# Patient Record
Sex: FEMALE | Race: WHITE | NOT HISPANIC OR LATINO | Employment: PART TIME | URBAN - METROPOLITAN AREA
[De-identification: names, ages, dates, MRNs, and addresses within clinical notes are randomized per-mention and may not be internally consistent; named-entity substitution may affect disease eponyms.]

---

## 2018-03-16 PROBLEM — D75.839 THROMBOCYTOSIS: Status: ACTIVE | Noted: 2018-03-16

## 2019-04-05 PROBLEM — E87.1 HYPONATREMIA: Status: ACTIVE | Noted: 2019-04-05

## 2019-04-05 PROBLEM — D75.839 THROMBOCYTOSIS: Status: RESOLVED | Noted: 2018-03-16 | Resolved: 2019-04-05

## 2020-11-29 ENCOUNTER — HOSPITAL ENCOUNTER (OUTPATIENT)
Facility: MEDICAL CENTER | Age: 55
End: 2020-11-29
Attending: NURSE PRACTITIONER
Payer: COMMERCIAL

## 2020-11-29 ENCOUNTER — OFFICE VISIT (OUTPATIENT)
Dept: URGENT CARE | Facility: CLINIC | Age: 55
End: 2020-11-29
Payer: COMMERCIAL

## 2020-11-29 VITALS
RESPIRATION RATE: 16 BRPM | DIASTOLIC BLOOD PRESSURE: 78 MMHG | BODY MASS INDEX: 25.61 KG/M2 | SYSTOLIC BLOOD PRESSURE: 120 MMHG | OXYGEN SATURATION: 93 % | TEMPERATURE: 100.1 F | HEIGHT: 68 IN | WEIGHT: 169 LBS | HEART RATE: 91 BPM

## 2020-11-29 DIAGNOSIS — R43.2 LOSS OF TASTE: ICD-10-CM

## 2020-11-29 DIAGNOSIS — R50.9 FEVER, UNSPECIFIED FEVER CAUSE: ICD-10-CM

## 2020-11-29 DIAGNOSIS — Z20.822 SUSPECTED COVID-19 VIRUS INFECTION: Primary | ICD-10-CM

## 2020-11-29 DIAGNOSIS — R43.0 LOSS OF SENSE OF SMELL: ICD-10-CM

## 2020-11-29 DIAGNOSIS — R05.9 COUGH: ICD-10-CM

## 2020-11-29 DIAGNOSIS — R52 GENERALIZED BODY ACHES: ICD-10-CM

## 2020-11-29 PROCEDURE — U0003 INFECTIOUS AGENT DETECTION BY NUCLEIC ACID (DNA OR RNA); SEVERE ACUTE RESPIRATORY SYNDROME CORONAVIRUS 2 (SARS-COV-2) (CORONAVIRUS DISEASE [COVID-19]), AMPLIFIED PROBE TECHNIQUE, MAKING USE OF HIGH THROUGHPUT TECHNOLOGIES AS DESCRIBED BY CMS-2020-01-R: HCPCS

## 2020-11-29 PROCEDURE — 99204 OFFICE O/P NEW MOD 45 MIN: CPT | Mod: CS | Performed by: NURSE PRACTITIONER

## 2020-11-29 ASSESSMENT — ENCOUNTER SYMPTOMS
VOMITING: 0
BLOOD IN STOOL: 0
MYALGIAS: 1
SORE THROAT: 0
BACK PAIN: 0
HEADACHES: 0
NAUSEA: 0
DIARRHEA: 0
SINUS PAIN: 0
TINGLING: 0
DIZZINESS: 0
FEVER: 1
HEARTBURN: 0
ORTHOPNEA: 0
CONSTIPATION: 0
WHEEZING: 0
PALPITATIONS: 0
MEMORY LOSS: 0
COUGH: 1
ABDOMINAL PAIN: 0
CHILLS: 1
SHORTNESS OF BREATH: 0

## 2020-11-29 NOTE — PROGRESS NOTES
Subjective:      Anali Pan is a 55 y.o. female who presents with Coronavirus Screening (loss of taste and smell,bodyaches,chills and fever x2 days)        Patient presents to urgent care with complaint of loss of sense of taste and smell, body aches, chills and fever x3 days.  Patient states that she has no known COVID-19 exposure however she does work in the  industry.    Fever   This is a new problem. Episode onset: In the past 3 days. The problem occurs intermittently. The problem has been unchanged. The temperature was taken using an oral thermometer. Associated symptoms include coughing and muscle aches. Pertinent negatives include no abdominal pain, chest pain, congestion, diarrhea, ear pain, headaches, nausea, rash, sleepiness, sore throat, urinary pain, vomiting or wheezing. She has tried acetaminophen for the symptoms. The treatment provided mild relief.   Risk factors: no sick contacts        Review of Systems   Constitutional: Positive for chills and fever. Negative for malaise/fatigue.   HENT: Negative for congestion, ear pain, sinus pain and sore throat.         +loss of sense of taste  +loss of sense of smell     Respiratory: Positive for cough. Negative for shortness of breath and wheezing.    Cardiovascular: Negative for chest pain, palpitations, orthopnea and leg swelling.   Gastrointestinal: Negative for abdominal pain, blood in stool, constipation, diarrhea, heartburn, nausea and vomiting.   Genitourinary: Negative for dysuria.   Musculoskeletal: Positive for myalgias. Negative for back pain and joint pain.   Skin: Negative for rash.   Neurological: Negative for dizziness, tingling and headaches.   Psychiatric/Behavioral: Negative for memory loss and suicidal ideas.   All other systems reviewed and are negative.    PMH:  has a past medical history of Hypertension.  MEDS:   Current Outpatient Medications:   •  nabumetone (RELAFEN) 500 MG Tab, Take 1 Tab by mouth 2 times a day as  "needed for Moderate Pain., Disp: 60 Tab, Rfl: 0  •  telmisartan (MICARDIS) 80 MG Tab, TAKE 1 TABLET BY MOUTH ONCE DAILY, Disp: 90 Tab, Rfl: 3  •  Fish Oil Oil, by Does not apply route., Disp: , Rfl:   •  Probiotic Product (PROBIOTIC DAILY PO), Take  by mouth., Disp: , Rfl:   •  Non Formulary Request, EHT age defying supplement .  1 tab once a day, Disp: , Rfl:   ALLERGIES: No Known Allergies  SURGHX:   Past Surgical History:   Procedure Laterality Date   • OVARIAN CYSTECTOMY       SOCHX:  reports that she has never smoked. She has never used smokeless tobacco. She reports current alcohol use of about 1.0 oz of alcohol per week. She reports that she does not use drugs.  FH: Reviewed with patient, not pertinent to this visit.        Objective:     /78 (BP Location: Left arm, Patient Position: Sitting)   Pulse 91   Temp 37.8 °C (100.1 °F)   Resp 16   Ht 1.727 m (5' 8\")   Wt 76.7 kg (169 lb)   SpO2 93%   BMI 25.70 kg/m²      Physical Exam  Vitals signs reviewed.   Constitutional:       General: She is not in acute distress.     Appearance: Normal appearance. She is not ill-appearing.   HENT:      Head: Normocephalic.      Right Ear: Tympanic membrane, ear canal and external ear normal.      Left Ear: Tympanic membrane, ear canal and external ear normal.      Nose: Nose normal. No congestion or rhinorrhea.      Mouth/Throat:      Mouth: Mucous membranes are moist.      Pharynx: No oropharyngeal exudate or posterior oropharyngeal erythema.   Eyes:      Extraocular Movements: Extraocular movements intact.      Conjunctiva/sclera: Conjunctivae normal.      Pupils: Pupils are equal, round, and reactive to light.   Neck:      Musculoskeletal: Normal range of motion and neck supple.   Cardiovascular:      Rate and Rhythm: Normal rate and regular rhythm.      Pulses: Normal pulses.      Heart sounds: Normal heart sounds. No murmur.   Pulmonary:      Effort: Pulmonary effort is normal. No respiratory distress.      " Breath sounds: Normal breath sounds. No wheezing, rhonchi or rales.   Abdominal:      General: Abdomen is flat.      Palpations: Abdomen is soft.      Tenderness: There is no abdominal tenderness.   Musculoskeletal: Normal range of motion.      Right lower leg: No edema.      Left lower leg: No edema.   Lymphadenopathy:      Cervical: No cervical adenopathy.   Skin:     General: Skin is warm and dry.      Capillary Refill: Capillary refill takes less than 2 seconds.   Neurological:      General: No focal deficit present.      Mental Status: She is alert and oriented to person, place, and time.   Psychiatric:         Mood and Affect: Mood normal.         Behavior: Behavior normal.                 Assessment/Plan:         1. Suspected COVID-19 virus infection  COVID/SARS COV-2 PCR   2. Loss of taste  COVID/SARS COV-2 PCR   3. Loss of sense of smell  COVID/SARS COV-2 PCR   4. Generalized body aches  COVID/SARS COV-2 PCR   5. Fever, unspecified fever cause  COVID/SARS COV-2 PCR   6. Cough  COVID/SARS COV-2 PCR       May take over-the-counter Ibuprofen 400-600 mg every 8 hours as needed for pain    Await Covid results.    Discussed with patient that symptoms are suspicious for Covid-19 infection vs other viral illness.  Vitals are stable at this time.  Patient is advised to self isolate and provided with self isolation precautions AVS information.  Discussed when to return to urgent care or ER including for worsening shortness of breath.  Patient verbalized understanding and has no additional questions.    Plan of care, medications and treatments reviewed with patient and or guardian.  Patient and or guardian voices understanding and agrees with the instructions provided. After visit summary reviewed with patient. Patient and or guardian understand the parameters for reevaluation and ER precautions discussed.     Please note that this dictation was created using voice recognition software. I have made every reasonable  attempt to correct obvious errors, but I expect that there are errors of grammar and possibly content that I did not discover before finalizing the note.

## 2020-12-01 DIAGNOSIS — R43.2 LOSS OF TASTE: ICD-10-CM

## 2020-12-01 DIAGNOSIS — Z20.822 SUSPECTED COVID-19 VIRUS INFECTION: ICD-10-CM

## 2020-12-01 DIAGNOSIS — R43.0 LOSS OF SENSE OF SMELL: ICD-10-CM

## 2020-12-01 DIAGNOSIS — R52 GENERALIZED BODY ACHES: ICD-10-CM

## 2020-12-01 DIAGNOSIS — R05.9 COUGH: ICD-10-CM

## 2020-12-01 DIAGNOSIS — R50.9 FEVER, UNSPECIFIED FEVER CAUSE: ICD-10-CM

## 2020-12-01 LAB
COVID ORDER STATUS COVID19: NORMAL
SARS-COV-2 RNA RESP QL NAA+PROBE: DETECTED
SPECIMEN SOURCE: ABNORMAL

## 2023-01-31 ENCOUNTER — HOSPITAL ENCOUNTER (INPATIENT)
Facility: MEDICAL CENTER | Age: 58
LOS: 6 days | DRG: 064 | End: 2023-02-06
Attending: INTERNAL MEDICINE | Admitting: STUDENT IN AN ORGANIZED HEALTH CARE EDUCATION/TRAINING PROGRAM
Payer: COMMERCIAL

## 2023-01-31 ENCOUNTER — HOSPITAL ENCOUNTER (OUTPATIENT)
Dept: RADIOLOGY | Facility: MEDICAL CENTER | Age: 58
End: 2023-01-31

## 2023-01-31 DIAGNOSIS — I10 PRIMARY HYPERTENSION: ICD-10-CM

## 2023-01-31 DIAGNOSIS — I61.9 NONTRAUMATIC ACUTE HEMORRHAGE OF BASAL GANGLIA (HCC): ICD-10-CM

## 2023-01-31 DIAGNOSIS — Z78.9 ALCOHOL USE: ICD-10-CM

## 2023-01-31 PROCEDURE — 770022 HCHG ROOM/CARE - ICU (200)

## 2023-01-31 PROCEDURE — 93005 ELECTROCARDIOGRAM TRACING: CPT | Performed by: STUDENT IN AN ORGANIZED HEALTH CARE EDUCATION/TRAINING PROGRAM

## 2023-01-31 PROCEDURE — 99291 CRITICAL CARE FIRST HOUR: CPT | Performed by: STUDENT IN AN ORGANIZED HEALTH CARE EDUCATION/TRAINING PROGRAM

## 2023-01-31 RX ORDER — POLYETHYLENE GLYCOL 3350 17 G/17G
1 POWDER, FOR SOLUTION ORAL
Status: DISCONTINUED | OUTPATIENT
Start: 2023-01-31 | End: 2023-02-06 | Stop reason: HOSPADM

## 2023-01-31 RX ORDER — ONDANSETRON 2 MG/ML
4 INJECTION INTRAMUSCULAR; INTRAVENOUS EVERY 4 HOURS PRN
Status: DISCONTINUED | OUTPATIENT
Start: 2023-01-31 | End: 2023-02-06 | Stop reason: HOSPADM

## 2023-01-31 RX ORDER — ENALAPRILAT 1.25 MG/ML
1.25 INJECTION INTRAVENOUS EVERY 6 HOURS PRN
Status: DISCONTINUED | OUTPATIENT
Start: 2023-01-31 | End: 2023-01-31

## 2023-01-31 RX ORDER — LORAZEPAM 2 MG/ML
2 INJECTION INTRAMUSCULAR
Status: DISCONTINUED | OUTPATIENT
Start: 2023-01-31 | End: 2023-02-06 | Stop reason: HOSPADM

## 2023-01-31 RX ORDER — ONDANSETRON 4 MG/1
4 TABLET, ORALLY DISINTEGRATING ORAL EVERY 4 HOURS PRN
Status: DISCONTINUED | OUTPATIENT
Start: 2023-01-31 | End: 2023-01-31

## 2023-01-31 RX ORDER — ACETAMINOPHEN 650 MG/1
650 SUPPOSITORY RECTAL EVERY 4 HOURS PRN
Status: DISCONTINUED | OUTPATIENT
Start: 2023-01-31 | End: 2023-02-06 | Stop reason: HOSPADM

## 2023-01-31 RX ORDER — ENALAPRILAT 1.25 MG/ML
1.25 INJECTION INTRAVENOUS EVERY 6 HOURS PRN
Status: DISCONTINUED | OUTPATIENT
Start: 2023-01-31 | End: 2023-02-01

## 2023-01-31 RX ORDER — ACETAMINOPHEN 325 MG/1
650 TABLET ORAL EVERY 4 HOURS PRN
Status: DISCONTINUED | OUTPATIENT
Start: 2023-01-31 | End: 2023-02-06 | Stop reason: HOSPADM

## 2023-01-31 RX ORDER — LABETALOL HYDROCHLORIDE 5 MG/ML
10 INJECTION, SOLUTION INTRAVENOUS EVERY 4 HOURS PRN
Status: DISCONTINUED | OUTPATIENT
Start: 2023-01-31 | End: 2023-02-01

## 2023-01-31 RX ORDER — ONDANSETRON 4 MG/1
4 TABLET, ORALLY DISINTEGRATING ORAL EVERY 4 HOURS PRN
Status: DISCONTINUED | OUTPATIENT
Start: 2023-01-31 | End: 2023-02-06 | Stop reason: HOSPADM

## 2023-01-31 RX ORDER — ONDANSETRON 2 MG/ML
4 INJECTION INTRAMUSCULAR; INTRAVENOUS EVERY 4 HOURS PRN
Status: DISCONTINUED | OUTPATIENT
Start: 2023-01-31 | End: 2023-01-31

## 2023-01-31 RX ORDER — HYDRALAZINE HYDROCHLORIDE 20 MG/ML
10 INJECTION INTRAMUSCULAR; INTRAVENOUS EVERY 4 HOURS PRN
Status: DISCONTINUED | OUTPATIENT
Start: 2023-01-31 | End: 2023-02-01

## 2023-01-31 RX ORDER — BISACODYL 10 MG
10 SUPPOSITORY, RECTAL RECTAL
Status: DISCONTINUED | OUTPATIENT
Start: 2023-01-31 | End: 2023-02-06 | Stop reason: HOSPADM

## 2023-01-31 RX ORDER — ACETAMINOPHEN 325 MG/1
650 TABLET ORAL EVERY 6 HOURS PRN
Status: DISCONTINUED | OUTPATIENT
Start: 2023-01-31 | End: 2023-01-31

## 2023-01-31 RX ORDER — AMOXICILLIN 250 MG
2 CAPSULE ORAL 2 TIMES DAILY
Status: DISCONTINUED | OUTPATIENT
Start: 2023-02-01 | End: 2023-02-06 | Stop reason: HOSPADM

## 2023-01-31 RX ORDER — SODIUM CHLORIDE 9 MG/ML
INJECTION, SOLUTION INTRAVENOUS CONTINUOUS
Status: DISCONTINUED | OUTPATIENT
Start: 2023-02-01 | End: 2023-02-02

## 2023-01-31 ASSESSMENT — FIBROSIS 4 INDEX: FIB4 SCORE: 1.14

## 2023-02-01 ENCOUNTER — APPOINTMENT (OUTPATIENT)
Dept: RADIOLOGY | Facility: MEDICAL CENTER | Age: 58
DRG: 064 | End: 2023-02-01
Attending: STUDENT IN AN ORGANIZED HEALTH CARE EDUCATION/TRAINING PROGRAM
Payer: COMMERCIAL

## 2023-02-01 PROBLEM — Z78.9 ALCOHOL USE: Status: ACTIVE | Noted: 2023-02-01

## 2023-02-01 PROBLEM — E87.6 HYPOKALEMIA: Status: ACTIVE | Noted: 2023-02-01

## 2023-02-01 PROBLEM — E78.5 DYSLIPIDEMIA: Status: ACTIVE | Noted: 2023-02-01

## 2023-02-01 LAB
ALBUMIN SERPL BCP-MCNC: 4.5 G/DL (ref 3.2–4.9)
ALBUMIN/GLOB SERPL: 1.7 G/DL
ALP SERPL-CCNC: 96 U/L (ref 30–99)
ALT SERPL-CCNC: 28 U/L (ref 2–50)
AMPHET UR QL SCN: NEGATIVE
ANION GAP SERPL CALC-SCNC: 12 MMOL/L (ref 7–16)
AST SERPL-CCNC: 28 U/L (ref 12–45)
BARBITURATES UR QL SCN: NEGATIVE
BENZODIAZ UR QL SCN: NEGATIVE
BILIRUB SERPL-MCNC: 0.6 MG/DL (ref 0.1–1.5)
BUN SERPL-MCNC: 13 MG/DL (ref 8–22)
BZE UR QL SCN: NEGATIVE
CALCIUM ALBUM COR SERPL-MCNC: 8.6 MG/DL (ref 8.5–10.5)
CALCIUM SERPL-MCNC: 9 MG/DL (ref 8.5–10.5)
CANNABINOIDS UR QL SCN: NEGATIVE
CFT BLD TEG: 2.6 MIN (ref 4.6–9.1)
CFT P HPASE BLD TEG: 2.5 MIN (ref 4.3–8.3)
CHLORIDE SERPL-SCNC: 102 MMOL/L (ref 96–112)
CHOLEST SERPL-MCNC: 220 MG/DL (ref 100–199)
CLOT ANGLE BLD TEG: 76.6 DEGREES (ref 63–78)
CLOT LYSIS 30M P MA LENFR BLD TEG: 0 % (ref 0–2.6)
CO2 SERPL-SCNC: 23 MMOL/L (ref 20–33)
CREAT SERPL-MCNC: 0.55 MG/DL (ref 0.5–1.4)
CT.EXTRINSIC BLD ROTEM: 1 MIN (ref 0.8–2.1)
EKG IMPRESSION: NORMAL
ERYTHROCYTE [DISTWIDTH] IN BLOOD BY AUTOMATED COUNT: 41.9 FL (ref 35.9–50)
EST. AVERAGE GLUCOSE BLD GHB EST-MCNC: 100 MG/DL
ETHANOL BLD-MCNC: <10.1 MG/DL
GFR SERPLBLD CREATININE-BSD FMLA CKD-EPI: 107 ML/MIN/1.73 M 2
GLOBULIN SER CALC-MCNC: 2.7 G/DL (ref 1.9–3.5)
GLUCOSE SERPL-MCNC: 125 MG/DL (ref 65–99)
HBA1C MFR BLD: 5.1 % (ref 4–5.6)
HCT VFR BLD AUTO: 39.9 % (ref 37–47)
HDLC SERPL-MCNC: 68 MG/DL
HGB BLD-MCNC: 13.7 G/DL (ref 12–16)
LDLC SERPL CALC-MCNC: 139 MG/DL
MAGNESIUM SERPL-MCNC: 2.1 MG/DL (ref 1.5–2.5)
MCF BLD TEG: 64.8 MM (ref 52–69)
MCF.PLATELET INHIB BLD ROTEM: 31.2 MM (ref 15–32)
MCH RBC QN AUTO: 32.2 PG (ref 27–33)
MCHC RBC AUTO-ENTMCNC: 34.3 G/DL (ref 33.6–35)
MCV RBC AUTO: 93.7 FL (ref 81.4–97.8)
METHADONE UR QL SCN: NEGATIVE
OPIATES UR QL SCN: NEGATIVE
OXYCODONE UR QL SCN: NEGATIVE
PA AA BLD-ACNC: 11.5 % (ref 0–11)
PA ADP BLD-ACNC: 0 % (ref 0–17)
PCP UR QL SCN: NEGATIVE
PLATELET # BLD AUTO: 339 K/UL (ref 164–446)
PMV BLD AUTO: 9.6 FL (ref 9–12.9)
POTASSIUM SERPL-SCNC: 3.3 MMOL/L (ref 3.6–5.5)
PROPOXYPH UR QL SCN: NEGATIVE
PROT SERPL-MCNC: 7.2 G/DL (ref 6–8.2)
RBC # BLD AUTO: 4.26 M/UL (ref 4.2–5.4)
SODIUM SERPL-SCNC: 135 MMOL/L (ref 135–145)
SODIUM SERPL-SCNC: 137 MMOL/L (ref 135–145)
TEG ALGORITHM TGALG: ABNORMAL
TRIGL SERPL-MCNC: 67 MG/DL (ref 0–149)
WBC # BLD AUTO: 7 K/UL (ref 4.8–10.8)

## 2023-02-01 PROCEDURE — 85384 FIBRINOGEN ACTIVITY: CPT

## 2023-02-01 PROCEDURE — 85347 COAGULATION TIME ACTIVATED: CPT

## 2023-02-01 PROCEDURE — 83036 HEMOGLOBIN GLYCOSYLATED A1C: CPT

## 2023-02-01 PROCEDURE — A9270 NON-COVERED ITEM OR SERVICE: HCPCS | Performed by: STUDENT IN AN ORGANIZED HEALTH CARE EDUCATION/TRAINING PROGRAM

## 2023-02-01 PROCEDURE — 92610 EVALUATE SWALLOWING FUNCTION: CPT

## 2023-02-01 PROCEDURE — 700102 HCHG RX REV CODE 250 W/ 637 OVERRIDE(OP): Performed by: STUDENT IN AN ORGANIZED HEALTH CARE EDUCATION/TRAINING PROGRAM

## 2023-02-01 PROCEDURE — 700105 HCHG RX REV CODE 258: Performed by: STUDENT IN AN ORGANIZED HEALTH CARE EDUCATION/TRAINING PROGRAM

## 2023-02-01 PROCEDURE — 85027 COMPLETE CBC AUTOMATED: CPT

## 2023-02-01 PROCEDURE — 700111 HCHG RX REV CODE 636 W/ 250 OVERRIDE (IP): Performed by: INTERNAL MEDICINE

## 2023-02-01 PROCEDURE — 770022 HCHG ROOM/CARE - ICU (200)

## 2023-02-01 PROCEDURE — 80053 COMPREHEN METABOLIC PANEL: CPT

## 2023-02-01 PROCEDURE — 80061 LIPID PANEL: CPT

## 2023-02-01 PROCEDURE — 70553 MRI BRAIN STEM W/O & W/DYE: CPT

## 2023-02-01 PROCEDURE — 700117 HCHG RX CONTRAST REV CODE 255: Performed by: STUDENT IN AN ORGANIZED HEALTH CARE EDUCATION/TRAINING PROGRAM

## 2023-02-01 PROCEDURE — 82077 ASSAY SPEC XCP UR&BREATH IA: CPT

## 2023-02-01 PROCEDURE — 83735 ASSAY OF MAGNESIUM: CPT

## 2023-02-01 PROCEDURE — 85576 BLOOD PLATELET AGGREGATION: CPT | Mod: 91

## 2023-02-01 PROCEDURE — 99291 CRITICAL CARE FIRST HOUR: CPT | Performed by: INTERNAL MEDICINE

## 2023-02-01 PROCEDURE — 70544 MR ANGIOGRAPHY HEAD W/O DYE: CPT

## 2023-02-01 PROCEDURE — 700111 HCHG RX REV CODE 636 W/ 250 OVERRIDE (IP): Performed by: NURSE PRACTITIONER

## 2023-02-01 PROCEDURE — 700101 HCHG RX REV CODE 250: Performed by: STUDENT IN AN ORGANIZED HEALTH CARE EDUCATION/TRAINING PROGRAM

## 2023-02-01 PROCEDURE — 700111 HCHG RX REV CODE 636 W/ 250 OVERRIDE (IP): Performed by: STUDENT IN AN ORGANIZED HEALTH CARE EDUCATION/TRAINING PROGRAM

## 2023-02-01 PROCEDURE — 99222 1ST HOSP IP/OBS MODERATE 55: CPT | Mod: FS | Performed by: NURSE PRACTITIONER

## 2023-02-01 PROCEDURE — 80307 DRUG TEST PRSMV CHEM ANLYZR: CPT

## 2023-02-01 PROCEDURE — 84295 ASSAY OF SERUM SODIUM: CPT

## 2023-02-01 PROCEDURE — 93010 ELECTROCARDIOGRAM REPORT: CPT | Performed by: INTERNAL MEDICINE

## 2023-02-01 PROCEDURE — A9579 GAD-BASE MR CONTRAST NOS,1ML: HCPCS | Performed by: STUDENT IN AN ORGANIZED HEALTH CARE EDUCATION/TRAINING PROGRAM

## 2023-02-01 RX ORDER — ATORVASTATIN CALCIUM 10 MG/1
10 TABLET, FILM COATED ORAL EVERY EVENING
Status: DISCONTINUED | OUTPATIENT
Start: 2023-02-01 | End: 2023-02-06 | Stop reason: HOSPADM

## 2023-02-01 RX ORDER — HYDRALAZINE HYDROCHLORIDE 20 MG/ML
10 INJECTION INTRAMUSCULAR; INTRAVENOUS EVERY 4 HOURS PRN
Status: DISCONTINUED | OUTPATIENT
Start: 2023-02-01 | End: 2023-02-06 | Stop reason: HOSPADM

## 2023-02-01 RX ORDER — LABETALOL HYDROCHLORIDE 5 MG/ML
10 INJECTION, SOLUTION INTRAVENOUS EVERY 4 HOURS PRN
Status: DISCONTINUED | OUTPATIENT
Start: 2023-02-01 | End: 2023-02-06 | Stop reason: HOSPADM

## 2023-02-01 RX ORDER — POTASSIUM CHLORIDE 7.45 MG/ML
10 INJECTION INTRAVENOUS
Status: COMPLETED | OUTPATIENT
Start: 2023-02-01 | End: 2023-02-01

## 2023-02-01 RX ORDER — ATORVASTATIN CALCIUM 20 MG/1
20 TABLET, FILM COATED ORAL EVERY EVENING
Status: DISCONTINUED | OUTPATIENT
Start: 2023-02-01 | End: 2023-02-01

## 2023-02-01 RX ORDER — LORAZEPAM 2 MG/ML
1 INJECTION INTRAMUSCULAR
Status: COMPLETED | OUTPATIENT
Start: 2023-02-01 | End: 2023-02-01

## 2023-02-01 RX ORDER — ENALAPRILAT 1.25 MG/ML
1.25 INJECTION INTRAVENOUS EVERY 6 HOURS PRN
Status: DISCONTINUED | OUTPATIENT
Start: 2023-02-01 | End: 2023-02-06 | Stop reason: HOSPADM

## 2023-02-01 RX ADMIN — LORAZEPAM 1 MG: 2 INJECTION INTRAMUSCULAR; INTRAVENOUS at 05:00

## 2023-02-01 RX ADMIN — THIAMINE HYDROCHLORIDE 200 MG: 100 INJECTION, SOLUTION INTRAMUSCULAR; INTRAVENOUS at 05:56

## 2023-02-01 RX ADMIN — POTASSIUM CHLORIDE 10 MEQ: 7.46 INJECTION, SOLUTION INTRAVENOUS at 11:55

## 2023-02-01 RX ADMIN — SODIUM CHLORIDE: 9 INJECTION, SOLUTION INTRAVENOUS at 02:13

## 2023-02-01 RX ADMIN — POTASSIUM CHLORIDE 10 MEQ: 7.46 INJECTION, SOLUTION INTRAVENOUS at 11:00

## 2023-02-01 RX ADMIN — ATORVASTATIN CALCIUM 10 MG: 10 TABLET, FILM COATED ORAL at 17:17

## 2023-02-01 RX ADMIN — ONDANSETRON 4 MG: 2 INJECTION INTRAMUSCULAR; INTRAVENOUS at 18:27

## 2023-02-01 RX ADMIN — THIAMINE HYDROCHLORIDE 200 MG: 100 INJECTION, SOLUTION INTRAMUSCULAR; INTRAVENOUS at 17:27

## 2023-02-01 RX ADMIN — NICARDIPINE HYDROCHLORIDE 15 MG/HR: 25 INJECTION, SOLUTION INTRAVENOUS at 01:45

## 2023-02-01 RX ADMIN — LORAZEPAM 1 MG: 2 INJECTION INTRAMUSCULAR; INTRAVENOUS at 05:30

## 2023-02-01 RX ADMIN — ACETAMINOPHEN 650 MG: 325 TABLET, FILM COATED ORAL at 17:21

## 2023-02-01 RX ADMIN — SENNOSIDES AND DOCUSATE SODIUM 2 TABLET: 50; 8.6 TABLET ORAL at 17:17

## 2023-02-01 RX ADMIN — POTASSIUM CHLORIDE 10 MEQ: 7.46 INJECTION, SOLUTION INTRAVENOUS at 09:57

## 2023-02-01 RX ADMIN — SODIUM CHLORIDE: 9 INJECTION, SOLUTION INTRAVENOUS at 15:46

## 2023-02-01 RX ADMIN — HYDRALAZINE HYDROCHLORIDE 10 MG: 20 INJECTION INTRAMUSCULAR; INTRAVENOUS at 11:07

## 2023-02-01 RX ADMIN — POTASSIUM CHLORIDE 10 MEQ: 7.46 INJECTION, SOLUTION INTRAVENOUS at 08:55

## 2023-02-01 RX ADMIN — GADOTERIDOL 17 ML: 279.3 INJECTION, SOLUTION INTRAVENOUS at 05:21

## 2023-02-01 RX ADMIN — HYDRALAZINE HYDROCHLORIDE 10 MG: 20 INJECTION INTRAMUSCULAR; INTRAVENOUS at 18:13

## 2023-02-01 RX ADMIN — LABETALOL HYDROCHLORIDE 10 MG: 5 INJECTION, SOLUTION INTRAVENOUS at 16:17

## 2023-02-01 RX ADMIN — LABETALOL HYDROCHLORIDE 10 MG: 5 INJECTION, SOLUTION INTRAVENOUS at 08:51

## 2023-02-01 RX ADMIN — NICARDIPINE HYDROCHLORIDE 15 MG/HR: 25 INJECTION, SOLUTION INTRAVENOUS at 00:00

## 2023-02-01 RX ADMIN — SODIUM CHLORIDE: 9 INJECTION, SOLUTION INTRAVENOUS at 01:51

## 2023-02-01 ASSESSMENT — COGNITIVE AND FUNCTIONAL STATUS - GENERAL
SUGGESTED CMS G CODE MODIFIER MOBILITY: CH
MOBILITY SCORE: 24

## 2023-02-01 ASSESSMENT — PAIN DESCRIPTION - PAIN TYPE
TYPE: ACUTE PAIN

## 2023-02-01 ASSESSMENT — LIFESTYLE VARIABLES
EVER FELT BAD OR GUILTY ABOUT YOUR DRINKING: YES
TOTAL SCORE: 3
ALCOHOL_USE: YES
EVER HAD A DRINK FIRST THING IN THE MORNING TO STEADY YOUR NERVES TO GET RID OF A HANGOVER: NO
TOTAL SCORE: 3
DOES PATIENT WANT TO STOP DRINKING: CANNOT ASSESS
ON A TYPICAL DAY WHEN YOU DRINK ALCOHOL HOW MANY DRINKS DO YOU HAVE: 5
TOTAL SCORE: 3
HAVE YOU EVER FELT YOU SHOULD CUT DOWN ON YOUR DRINKING: YES
HAVE PEOPLE ANNOYED YOU BY CRITICIZING YOUR DRINKING: YES
CONSUMPTION TOTAL: INCOMPLETE
AVERAGE NUMBER OF DAYS PER WEEK YOU HAVE A DRINK CONTAINING ALCOHOL: 6

## 2023-02-01 ASSESSMENT — FIBROSIS 4 INDEX: FIB4 SCORE: 1.14

## 2023-02-01 NOTE — CONSULTS
No chief complaint on file.      Problem List Items Addressed This Visit       * (Principal) Nontraumatic acute hemorrhage of basal ganglia (HCC)     Hypertensive etiology  Admit ICU  q1 hour neuro checks    SBP <140    PRN:  - Labetalol  - Hydralazine    Nicardipine gtt     No VTE ppx/antiplatelet   q6 Na - ok for normonatremia currently, if worsening edema / shift on next CT will add 3%  Mannitol 50mg q6 if concern for elevated ICP  Repeat CT head w/out contrast for clinical change  MRI brain w/wo contrast (ich protocol)  MRA brain without contrast  Unless traumatic, no indication for seizure prophylaxis  Platelet >100k, INR <1.5  Echocardiogram    HOB > 30 degrees  PT/OT/SLP  Avoid lying flat when possible    Neurology discussed with OSH on transfer center call, aware of the patient.  Will re-engage them in the morning         Relevant Orders    Referral to Physiatry (PMR)    Referral to Neurology       Neurology Consultation    History of present illness:  This is a 57-year old female with Pmhx significant for hypertension, medical non compliance, ETOH abuse (5-6 drinks per day) who presented to [facility] as a transfer from [facility] where she presented for a chief complaint of Right sided weakness and difficulty speaking/altered mentation. Per report, the patient was witnessed to have sudden onset Right sided weakness yesterday evening; exact time is unclear. She reportedly complained of a headache as well, and difficulty speaking. She was subsequently brought to the OSH; on arrival, SBP reportedly in the 200s; was treated with nicardipine. Stat CT head revealed acute Left thalamic hemorrhage, perhaps with some involvement of the midbrain; no obvious intraventricular hemorrhage. Per family's request patient was ultimately transferred to [facility] this morning for further care. Currently patient is sitting up in bed; somnolent. Reportedly received Ativan en route to [facility] and prior to MRI this  morning. She is arousable, purposeful with LUE/LLE; however does not follow commands. Minimal verbal output; few incomprehensible words; ROS is thus limited.     ICH score at 0805 2/1/23: 1 (GCS 5-12).     Neurology has been consulted by Dr. Jeremy Gonda to further evaluate the findings noted above.     Past medical history:   Past Medical History:   Diagnosis Date    Hypertension        Past surgical history:   Past Surgical History:   Procedure Laterality Date    OVARIAN CYSTECTOMY         Family history:   Family History   Problem Relation Age of Onset    Breast Cancer Mother     Hypertension Mother     Thyroid Mother         Hyperthyoidism    Cancer Mother         Breast    Hypertension Father     Diabetes Father         Type 2    Hypertension Brother         Gout       Social history:   Social History     Socioeconomic History    Marital status: Single     Spouse name: Not on file    Number of children: Not on file    Years of education: Not on file    Highest education level: Not on file   Occupational History    Not on file   Tobacco Use    Smoking status: Never    Smokeless tobacco: Never   Vaping Use    Vaping Use: Never used   Substance and Sexual Activity    Alcohol use: Yes     Alcohol/week: 21.0 oz     Types: 35 Glasses of wine per week     Comment: 1-2 drinks per day, 4-6 days a week    Drug use: No    Sexual activity: Not Currently   Other Topics Concern    Not on file   Social History Narrative    . 3 kids. One at home. Self employed     Social Determinants of Health     Financial Resource Strain: Not on file   Food Insecurity: Not on file   Transportation Needs: Not on file   Physical Activity: Not on file   Stress: Not on file   Social Connections: Not on file   Intimate Partner Violence: Not on file   Housing Stability: Not on file       Current medications:   Current Facility-Administered Medications   Medication Dose    thiamine (B-1) 200 mg in dextrose 5% 100 mL IVPB  200 mg    Followed  by    [START ON 2/3/2023] thiamine (B-1) IVPB 100 mg in 100 mL D5W (premix)  100 mg    enalaprilat (Vasotec) injection 1.25 mg 1 mL  1.25 mg    hydrALAZINE (APRESOLINE) injection 10 mg  10 mg    labetalol (NORMODYNE/TRANDATE) injection 10 mg  10 mg    niCARdipine (CARDENE) 25 mg in  mL Standard Infusion  0-15 mg/hr    senna-docusate (PERICOLACE or SENOKOT S) 8.6-50 MG per tablet 2 Tablet  2 Tablet    And    polyethylene glycol/lytes (MIRALAX) PACKET 1 Packet  1 Packet    And    magnesium hydroxide (MILK OF MAGNESIA) suspension 30 mL  30 mL    And    bisacodyl (DULCOLAX) suppository 10 mg  10 mg    NS infusion      LORazepam (ATIVAN) injection 2 mg  2 mg    acetaminophen (Tylenol) tablet 650 mg  650 mg    Or    acetaminophen (TYLENOL) suppository 650 mg  650 mg    ondansetron (ZOFRAN ODT) dispertab 4 mg  4 mg    Or    ondansetron (ZOFRAN) syringe/vial injection 4 mg  4 mg    MD Alert...ICU Electrolyte Replacement per Pharmacy         Medication Allergy:  No Known Allergies    Review of systems:   As noted above, otherwise limited as patient is aphasic, altered.     Physical examination:   Vitals:    02/01/23 0500 02/01/23 0600 02/01/23 0700 02/01/23 0800   BP: 130/78 135/82 117/72 131/78   Pulse: 95 77 77 76   Resp: 16 12 13 13   Temp:       TempSrc:       SpO2: 98% 99% 98% 98%   Weight:       Height:         General: Patient in no acute distress.  HEENT: Normocephalic, no signs of acute trauma.   Neck: supple, no meningeal signs. There is normal range of motion. No tenderness on exam.   Chest: clear to auscultation. No cough.   CV: RRR, no murmurs.   Skin: no signs of acute rashes or trauma.   Musculoskeletal: joints exhibit full range of motion, without any pain to palpation. There are no signs of joint or muscle swelling. There is no tenderness to deep palpation of muscles.   Psychiatric: No hallucinatory behavior.       NEUROLOGICAL EXAM:   Mental status, orientation: Arousable, poorly interactive. Does not  follow commands.   Speech and language: Minimal verbal output; few incomprehensible words; does not follow commands.   Cranial nerve exam: Pupils are 3 mm bilaterally and equally reactive to light. Eyes midline, gaze downward. Blinks to visual threat, more briskly on the Right. Does not track spontaneously nor to command. Cough/gag/corneal reflexes appear to be intact. Occulocephalics intact. Face appears symmetric.  Tongue is midline and without any signs of tongue biting or fasciculations.  Motor exam: Moves LUE/LE spontaneously, with full antigravity; RUE with minimal movement; RLE at least antigravity. No abnormal movements were seen on exam.   Sensory exam Localize to nox stim x 4, more brisk on the Left  Deep tendon reflexes: Plantar responses are flexor. There is no clonus.   Coordination: Deferred, patient does not participate.   Gait: Not assessed at this time as patient is currently unable.      ANCILLARY DATA REVIEWED:     Lab Data Review:  Recent Results (from the past 24 hour(s))   COMPLETE CBC & AUTO DIFF WBC    Collection Time: 01/31/23  8:55 PM   Result Value Ref Range    Leukocytes, Absolute 8.2 3.7 - 10.6 x10E3/uL    RBC 4.39 4.19 - 5.21 x10e6/uL    Hemoglobin 13.9 12.3 - 16.0 g/dL    Hematocrit 40.4 36.0 - 47.0 %    MCV 92.0 81.0 - 99.0 fL    MCH 31.6 28.0 - 33.8 pg    MCHC 34.4 33.1 - 36.5 g/dL    RDW 12.5 11.8 - 14.0 %    Platelet Count 378 146 - 390 x10E3/uL    MPV 7.7 6.4 - 10.2 fL    Neutrophils-Polys 55.4 41.7 - 82.3 %    Lymphocytes 30.3 10.8 - 44.4 %    Monocytes 7.1 5.0 - 12.8 %    Eosinophils 6.8 (H) 0.0 - 6.6 %    Basophils 0.4 0.0 - 1.3 %    Neutrophils (Absolute) 4.50 1.40 - 8.00 x10E3/uL    Lymphs (Absolute) 2.50 1.00 - 5.20 x10E3/uL    Monos (Absolute) 0.60 0.16 - 1.00 x10E3/uL    Eos (Absolute) 0.60 0.00 - 0.80 x10E3/uL    Baso (Absolute) 0.00 0.00 - 0.30 x10E3/uL   COMP METABOLIC PANEL    Collection Time: 01/31/23  8:55 PM   Result Value Ref Range    Sodium 134 (L) 136 - 144  mmol/L    Potassium 4.2 3.6 - 5.1 mmol/L    Chloride 100 (L) 102 - 110 mmol/L    Co2 24 22 - 32 mmol/L    Alkaline Phosphatase 95 38 - 126 U/L    AST(SGOT) 42 (H) 15 - 41 U/L    ALT(SGPT) 30 14 - 54 U/L    Bun 22 (H) 8 - 20 mg/dL    Creatinine 0.71 0.60 - 1.10 mg/dL    Calcium 9.5 8.7 - 10.3 mg/dL    Total Protein 7.6 6.4 - 8.2 g/dL    Albumin 4.6 3.5 - 4.8 g/dL    Ag Ratio 1.5 1.0 - 2.2 ratio    Anion Gap 10 2 - 11 mmol/L    Glom Filt Rate, Est 85 >60 mL/min/1.7    Globulin 3.0 2.6 - 3.1 g/dL    Glucose 108 (H) 60 - 99 mg/dL    Total Bilirubin 0.4 0.4 - 2.0 mg/dL   EKG    Collection Time: 23 11:30 PM   Result Value Ref Range    Report       [facility]    Test Date:  2023  Pt Name:    NICHOLE SANTIAGO             Department: [facility]  MRN:        [id number]                      Room:       [facility]  Gender:     Female                       Technician: [facility]  :        [id number]                   Requested By:CARLOS CRESPO IV  Order #:    [id number]                    Reading MD: Otoniel Adhikari MD    Measurements  Intervals                                Axis  Rate:       103                          P:          50  PA:         173                          QRS:        -13  QRSD:       97                           T:          40  QT:         385  QTc:        504    Interpretive Statements  Sinus tachycardia  Borderline prolonged QT interval  No previous ECG available for comparison  Electronically Signed On 2023 6:29:07 PST by Otoniel Adhikari MD     DIAGNOSTIC ALCOHOL    Collection Time: 23 12:40 AM   Result Value Ref Range    Diagnostic Alcohol <10.1 <10.1 mg/dL   SODIUM SERUM (NA)    Collection Time: 23 12:40 AM   Result Value Ref Range    Sodium 135 135 - 145 mmol/L   Lipid Profile    Collection Time: 23 12:40 AM   Result Value Ref Range    Cholesterol,Tot 220 (H) 100 - 199 mg/dL    Triglycerides 67 0 - 149 mg/dL    HDL 68 >=40 mg/dL     (H) <100 mg/dL   HEMOGLOBIN A1C     Collection Time: 02/01/23 12:40 AM   Result Value Ref Range    Glycohemoglobin 5.1 4.0 - 5.6 %    Est Avg Glucose 100 mg/dL   Platelet Mapping with Basic TEG    Collection Time: 02/01/23 12:55 AM   Result Value Ref Range    Reaction Time Initial-R 2.6 (L) 4.6 - 9.1 min    React Time Initial Hep 2.5 (L) 4.3 - 8.3 min    Clot Kinetics-K 1.0 0.8 - 2.1 min    Clot Angle-Angle 76.6 63.0 - 78.0 degrees    Maximum Clot Strength-MA 64.8 52.0 - 69.0 mm    TEG Functional Fibrinogen(MA) 31.2 15.0 - 32.0 mm    Lysis 30 minutes-LY30 0.0 0.0 - 2.6 %    % Inhibition ADP 0.0 0.0 - 17.0 %    % Inhibition AA 11.5 (H) 0.0 - 11.0 %    TEG Algorithm Link Algorithm    Urine Drug Screen    Collection Time: 02/01/23  1:50 AM   Result Value Ref Range    Amphetamines Urine Negative Negative    Barbiturates Negative Negative    Benzodiazepines Negative Negative    Cocaine Metabolite Negative Negative    Methadone Negative Negative    Opiates Negative Negative    Oxycodone Negative Negative    Phencyclidine -Pcp Negative Negative    Propoxyphene Negative Negative    Cannabinoid Metab Negative Negative   CBC without Differential    Collection Time: 02/01/23  6:00 AM   Result Value Ref Range    WBC 7.0 4.8 - 10.8 K/uL    RBC 4.26 4.20 - 5.40 M/uL    Hemoglobin 13.7 12.0 - 16.0 g/dL    Hematocrit 39.9 37.0 - 47.0 %    MCV 93.7 81.4 - 97.8 fL    MCH 32.2 27.0 - 33.0 pg    MCHC 34.3 33.6 - 35.0 g/dL    RDW 41.9 35.9 - 50.0 fL    Platelet Count 339 164 - 446 K/uL    MPV 9.6 9.0 - 12.9 fL   Comp Metabolic Panel (CMP)    Collection Time: 02/01/23  6:00 AM   Result Value Ref Range    Sodium 137 135 - 145 mmol/L    Potassium 3.3 (L) 3.6 - 5.5 mmol/L    Chloride 102 96 - 112 mmol/L    Co2 23 20 - 33 mmol/L    Anion Gap 12.0 7.0 - 16.0    Glucose 125 (H) 65 - 99 mg/dL    Bun 13 8 - 22 mg/dL    Creatinine 0.55 0.50 - 1.40 mg/dL    Calcium 9.0 8.5 - 10.5 mg/dL    AST(SGOT) 28 12 - 45 U/L    ALT(SGPT) 28 2 - 50 U/L    Alkaline Phosphatase 96 30 - 99 U/L     Total Bilirubin 0.6 0.1 - 1.5 mg/dL    Albumin 4.5 3.2 - 4.9 g/dL    Total Protein 7.2 6.0 - 8.2 g/dL    Globulin 2.7 1.9 - 3.5 g/dL    A-G Ratio 1.7 g/dL   Magnesium    Collection Time: 02/01/23  6:00 AM   Result Value Ref Range    Magnesium 2.1 1.5 - 2.5 mg/dL   ESTIMATED GFR    Collection Time: 02/01/23  6:00 AM   Result Value Ref Range    GFR (CKD-EPI) 107 >60 mL/min/1.73 m 2   CORRECTED CALCIUM    Collection Time: 02/01/23  6:00 AM   Result Value Ref Range    Correct Calcium 8.6 8.5 - 10.5 mg/dL       Labs reviewed by me.       Imaging reviewed by me:     MR-BRAIN-WITH & W/O   Final Result         Acute-subacute hemorrhage in the left thalamus and adjacent parietal corona radiata with mass effect upon the posterior third ventricle and slight midline shift measuring 4 to 5 mm. Surrounding edema noted which extends to the cerebral peduncle on the    tectum.      No abnormal vascularity or abnormal enhancement is identified in the vicinity of the hemorrhage to suggest an underlying lesion.      Nonspecific T2 hyperintensities are noted in the periventricular and deep white matter, most likely related to chronic microvascular ischemia.      MR-MRA HEAD-W/O    (Results Pending)   EC-ECHOCARDIOGRAM COMPLETE W/O CONT    (Results Pending)         Modified [place] Scale (MRS): 0 = No symptoms      ASSESSMENT AND PLAN:  57-year old female with Pmhx significant for hypertension, medical non compliance, ETOH abuse (5-6 drinks per day) who presented to [facility] as a transfer from [facility] where she presented for a chief complaint of Right sided weakness and difficulty speaking/altered mentation; on arrival to OSH, SBP 200s; CT head acute Left thalamic hemorrhage, perhaps with some involvement of the midbrain; no obvious intraventricular hemorrhage. Etiology hypertensive. ICH score 1. Plan for ICU care-- strict BP control, normonatremia for now; monitoring for ETOH withdrawal.      Recommendations/Plan:     -q2h and PRN neuro assessment. VS per nursing/unit protocol. -140 strict; Antihypertensives per primary/ICU team. Wean nicardipine, re start PO antihypertensives as soon as possible.   -No anticoagulation/antithrombotics at this time.   -MRI Brain completed early this morning-- expected mass effect, no obvious underlying lesion.   -Maintain strict euthermia, euglycemia, eunatremia. Current Na is 137. q6h Na checks.   -PT/OT/SLP eval and treat, when appropriate.   -Will defer all other medical management to ICU team. CIWA protocol per ICU team.   -DVT PPX: SCDs.     The plan of care above has been discussed with Dr. Shaka Olson. Please call with questions.       LATRICIA OnofreRGORAN.  [place] [employer]

## 2023-02-01 NOTE — DISCHARGE PLANNING
[facility] Transitional Care Coordination    Referral from: Dr. Aguayo    Insurance Provider on Facesheet: Atilio    Potential Rehab Diagnosis: L BG/thalamic hemorrhage    Chart review indicates patient may have on going medical management and may have therapy needs to possibly meet inpatient rehab facility criteria with the goal of returning to community.    D/C support will need to be verified: Mother    Physiatry consultation pended per protocol.  W/U & TX pending.      Thank you for the referral.

## 2023-02-01 NOTE — THERAPY
"Speech Language Pathology   Clinical Swallow Evaluation     Patient Name: Anali Pan  AGE:  57 y.o., SEX:  female  Medical Record #: [id number]  Today's Date: 2023     Precautions  Precautions: Fall Risk, Swallow Precautions ( See Comments)    HPI: 58 y/o female presented to outside facility with acute headache and R side facial droop. Family requested transfer to [facility]. No hx SLP in [facility].     CMHx: L BG/thalamic hemorrhage  PMHx:HTN, EtOH use, HPV    MRI Brain w/ and w/o :  \"Acute-subacute hemorrhage in the left thalamus and adjacent parietal corona radiata with mass effect upon the posterior third ventricle and slight midline shift measuring 4 to 5 mm. Surrounding edema noted which extends to the cerebral peduncle on the tectum.  No abnormal vascularity or abnormal enhancement is identified in the vicinity of the hemorrhage to suggest an underlying lesion.  Nonspecific T2 hyperintensities are noted in the periventricular and deep white matter, most likely related to chronic microvascular ischemia.\"    No CXR on file.     Level of Consciousness: Awake  Affect/Behavior: Calm, Cooperative  Follows Directives: Yes  Orientation: Self, , General place, Situation  Hearing: Functional hearing  Vision: Functional vision      Prior Living Situation & Level of Function:  Pt reports RG/TN diet at baseline. Reports no hx of reflux, no hx of PNA, and no difficulty swallowing pills.       Oral Mechanism Evaluation  Facial Symmetry:  Mild central R droop  Facial Sensation:  Appears intact, pt able to clear material from bilateral labial border  Labial Observations:  Mild R weakness, able to complete retraction WFL, lateralization with mild R weakness  Lingual Observations: Midline  Dentition: Good  Comments:      Voice  Quality: WFL  Resonance: WFL  Intensity: Alternating  Cough:  Did not follow command. Cued throat clear perceptually WFL, spontaneous throat clear perceptually weaker   Comments:      Current Method of " "Nutrition   NPO until cleared by speech pathology    From Dr. Augayo's note 1/31 at 11:48 PM: \"Nutrition: NPO until swallow eval (RN eval ok, but likely will need SLP).\" However RN screen in flowsheets report insufficient arousal for feeding w/ failed screen.     Subjective  Pt agreeable and cooperative all SLP evaluation tasks. \"She's been saying she's thisrty\" per dtr.       Assessment  Positioning: French's (60-90 degrees)  Bolus Administration: SLP and Patient  Oxygen Requirements:  1 L Nasal Cannula  Factor(s) Affecting Performance: Impaired endurance    Swallowing Trials  Thin Liquid (TN0): Impaired  Liquidised (LQ3): WFL  Easy to Chew (EC7): Impaired    Comments:  Pt w/ impaired self-feeding but able to complete with 1:1 spv and mod A from SLP. Appropriate oral bolus stripping and containment. Good lingual manipulation of bolus, total AP transit and effective bolus formation. Weak and prolonged/ineffective bite of EC7 cracker with prolonged mastication. Able to clear cracker bolus from oral cavity. One swallow appreciated per bolus.     Given single and sequential straw sips TN0, pt with dry throat clear immediately following in 2/4 attempts. No other overt s/sx of aspiration, including no increase in WBO, no change in vocal quality, no change in breath quality. Pt noted to swallow again after throat clear. Dry throat clear noted following PO was consistent with dry throat clear noted prior and approx. 5-6 minutes after PO. Intermittent belch during evaluation.     Discussed concern for aspiration in depth with pt and dtrs. Described risk factors and clinical signs of dysphagia. Described FEES procedure. Dtrs discussed and verbalized preference to start PO diet now with \"some easier foods\" and spv with consideration for FEES if increased difficulty or other signs of possible aspiration (including change in O2 needs or WBC count) occur. RN updated.     Clinical Impressions  Pt presents with oral phase dysphagia " and some signs concerning for possible airway invasion and pharyngeal dysphagia. Following SLP education, pt's family verbalized preference for trial diet at this time with close SLP follow. Pt does have some elevated risk for dysphagia given site of hemorrhage with impacts on alertness. SLP will follow closely. A modified diet is indicated at this time and an instrumental evaluation may be indicated pending clinical progress.        Recommendations  1.  Minced and moist with thin liquids  2.  Instrumentation: FEES, Instrumental swallow study pending clinical progress  3.  Swallowing Instructions & Precautions:   Supervision: 1:1 feeding with constant supervision, Encourage self-feeding  Positioning: Fully upright and midline during oral intake  Medication: Whole with puree, Crush with applesauce or puree, as appropriate  Strategies: Small bites/sips, Slow rate of intake  Oral Care: Q6h  4.  HOLD with lethargy or with difficulty. If persistent s/sx concerning for dysphagia occur, please hold tray and contact SLP.       Plan    Recommend Speech Therapy 4 times per week until therapy goals are met for the following treatments:  Dysphagia Training and Patient / Family / Caregiver Education.         Objective   02/01/23 1111   Initial Contact Note    Initial Contact Note  Order Received and Verified, Speech Therapy Evaluation in Progress with Full Report to Follow.   Vitals   Pulse Oximetry 93 %   O2 (LPM) 1   O2 Delivery Device Silicone Nasal Cannula   Pain 0 - 10 Group   Therapist Pain Assessment Post Activity Pain Same as Prior to Activity   Prior Living Situation   Lives with - Patient's Self Care Capacity Alone and Able to Care For Self   Prior Level Of Function   Communication Within Functional Limits   Swallow Within Functional Limits   Patient's Primary Language English   Dysphagia Rating   Nutritional Liquid Intake Rating Scale Non thickened beverages   Nutritional Food Intake Rating Scale Total oral diet with  "multiple consistencies but requiring special preparation or compensations   Patient / Family Goals   Patient / Family Goal #1 Dtr - \"Let's see how she does.\"   Short Term Goals   Short Term Goal # 1 Pt will consume diet of MM/TN given 1:1 spv from staff with no overt s/sx of aspiraiton or worsening of lung status.   Education Group   Education Provided Dysphagia;Role of Speech Therapy   Dysphagia Patient Response Patient;Family;Acceptance;Explanation;Verbal Demonstration;Action Demonstration;Reinforcement Needed   Role of SLP Patient Response Patient;Family;Acceptance;Explanation;Verbal Demonstration;Action Demonstration;Reinforcement Needed   Additional Comments Dtrs with good demonstrated understanding, pt with limited learning evidence and will need reinforcement   Problem List   Problem List Dysphagia  (aphasia - needs further assessment)   Interdisciplinary Plan of Care Collaboration   IDT Collaboration with  Nursing;Family / Caregiver;Physician   Collaboration Comments RN updated, swallow precautions hung       "

## 2023-02-01 NOTE — THERAPY
Physical Therapy Contact Note    Patient Name: Anali Pan  Age:  57 y.o., Sex:  female  Medical Record #: [id number]  Today's Date: 2/1/2023    PT Consult received/acknowledged. Pt with strict bedrest orders. Will initiate eval once pt cleared for OOB activity.    Ofelia Walls, PT, DPT  Ext. [phone]

## 2023-02-01 NOTE — PROGRESS NOTES
"Critical Care Progress Note    Date of admission  1/31/2023    Chief Complaint  57 y.o. female admitted 1/31/2023 with [facility]    Hospital Course  \"57 y.o. female with a history of HTN, EtOH use presented to [facility] with acute onset headache and right sided facial droop.  Found to have left BG/thalamic hemorrhage, started on nicardipine infusion.  Not anticoagulated.  Reportedly drinks 5-7 drinks daily, no other substance use known.  Family requested transfer from [facility] (see transfer center note and encounter note from ).\" - Dr. Aguayo 1/31    Interval Problem Update  Reviewed last 24 hour events:   - AF   - MRI without underlying mass/AVM   - persistent R sided weakness, facial droop, drowsy, expressive aphasia   - NSR, -150  - weaned off nicardipine gtt  - nl WBC  - low K  - NS @ 80, NPO  - PT/OT/SLP    Review of Systems  Review of Systems   Unable to perform ROS: Mental status change      Vital Signs for last 24 hours   Temp:  [37.7 °C (99.9 °F)] 37.7 °C (99.9 °F)  Pulse:  [] 95  Resp:  [14-23] 16  BP: (106-152)/(58-78) 130/78  SpO2:  [93 %-99 %] 98 %    Respiratory Information for the last 24 hours   5 lpm n/c    Physical Exam   Physical Exam  Vitals and nursing note reviewed.   Constitutional:       General: She is sleeping. She is not in acute distress.     Appearance: Normal appearance. She is well-developed and overweight. She is not toxic-appearing.      Interventions: Nasal cannula in place.   HENT:      Head: Normocephalic and atraumatic.      Nose: Nose normal. No congestion.      Mouth/Throat:      Mouth: Mucous membranes are dry.      Pharynx: Oropharynx is clear.   Eyes:      General: No scleral icterus.     Conjunctiva/sclera: Conjunctivae normal.      Pupils: Pupils are equal, round, and reactive to light.   Neck:      Vascular: No JVD.      Trachea: No tracheal deviation.   Cardiovascular:      Rate and Rhythm: Normal rate and regular rhythm. Occasional Extrasystoles are " present.     Chest Wall: PMI is displaced.      Pulses: Normal pulses.      Heart sounds: Normal heart sounds. No murmur heard.     Comments: Hypertensive requiring nicardipine drip  Pulmonary:      Effort: Pulmonary effort is normal. No tachypnea or respiratory distress.      Breath sounds: Normal breath sounds. No wheezing, rhonchi or rales.   Abdominal:      General: Bowel sounds are normal. There is no distension.      Palpations: Abdomen is soft.      Tenderness: There is no abdominal tenderness. There is no guarding or rebound.   Musculoskeletal:         General: No tenderness.      Cervical back: Neck supple. No rigidity.      Right lower leg: No edema.      Left lower leg: No edema.   Skin:     General: Skin is warm and dry.      Capillary Refill: Capillary refill takes less than 2 seconds.      Coloration: Skin is not pale.   Neurological:      Mental Status: She is easily aroused.      Comments: Right-sided weakness worse in the upper than the lower extremity, follows commands on the left but has some expressive aphasia, right facial droop   Psychiatric:         Mood and Affect: Mood normal.         Behavior: Behavior is cooperative.       Medications  Current Facility-Administered Medications   Medication Dose Route Frequency Provider Last Rate Last Admin    thiamine (B-1) 200 mg in dextrose 5% 100 mL IVPB  200 mg Intravenous BID Domingo Aguayo M.D. 200 mL/hr at 02/01/23 0556 200 mg at 02/01/23 0556    Followed by    [START ON 2/3/2023] thiamine (B-1) IVPB 100 mg in 100 mL D5W (premix)  100 mg Intravenous DAILY Domingo Aguayo M.D.        enalaprilat (Vasotec) injection 1.25 mg 1 mL  1.25 mg Intravenous Q6HRS PRN Domingo Aguayo M.D.        hydrALAZINE (APRESOLINE) injection 10 mg  10 mg Intravenous Q4HRS PRN Domingo Aguayo M.D.        labetalol (NORMODYNE/TRANDATE) injection 10 mg  10 mg Intravenous Q4HRS PRN Domingo Aguayo M.D.        niCARdipine (CARDENE) 25 mg in  mL Standard Infusion  0-15 mg/hr  Intravenous Continuous Domingo Aguayo M.D.   Paused at 02/01/23 0449    senna-docusate (PERICOLACE or SENOKOT S) 8.6-50 MG per tablet 2 Tablet  2 Tablet Oral BID Domingo Aguayo M.D.        And    polyethylene glycol/lytes (MIRALAX) PACKET 1 Packet  1 Packet Oral QDAY PRN Domingo Aguayo M.D.        And    magnesium hydroxide (MILK OF MAGNESIA) suspension 30 mL  30 mL Oral QDAY PRN Domingo Aguayo M.D.        And    bisacodyl (DULCOLAX) suppository 10 mg  10 mg Rectal QDAY PRN Domingo Aguayo M.D.        NS infusion   Intravenous Continuous Domingo Aguayo M.D. 80 mL/hr at 02/01/23 0213 New Bag at 02/01/23 0213    LORazepam (ATIVAN) injection 2 mg  2 mg Intravenous Q5 MIN PRN Domingo Aguayo M.D.        acetaminophen (Tylenol) tablet 650 mg  650 mg Oral Q4HRS PRN Domingo Aguayo M.D.        Or    acetaminophen (TYLENOL) suppository 650 mg  650 mg Rectal Q4HRS PRN Domingo Aguayo M.D.        ondansetron (ZOFRAN ODT) dispertab 4 mg  4 mg Oral Q4HRS PRBETH Aguayo M.D.        Or    ondansetron (ZOFRAN) syringe/vial injection 4 mg  4 mg Intravenous Q4HRS PRN Domingo Aguayo M.D.        MD Alert...ICU Electrolyte Replacement per Pharmacy   Other PHARMACY TO DOSE Domingo Aguayo M.D.           Fluids    Intake/Output Summary (Last 24 hours) at 2/1/2023 0635  Last data filed at 2/1/2023 0400  Gross per 24 hour   Intake 718.71 ml   Output 1250 ml   Net -531.29 ml       Laboratory          Recent Labs     01/31/23 2055 02/01/23  0040   SODIUM 134* 135   POTASSIUM 4.2  --    CHLORIDE 100*  --    CO2 24  --    BUN 22*  --    CREATININE 0.71  --    CALCIUM 9.5  --      Recent Labs     01/31/23 2055   ALTSGPT 30   ASTSGOT 42*   ALKPHOSPHAT 95   TBILIRUBIN 0.4   GLUCOSE 108*     Recent Labs     01/31/23 2055   NEUTSPOLYS 55.4   LYMPHOCYTES 30.3   MONOCYTES 7.1   EOSINOPHILS 6.8*   BASOPHILS 0.4   ASTSGOT 42*   ALTSGPT 30   ALKPHOSPHAT 95   TBILIRUBIN 0.4     Recent Labs     01/31/23 2055   RBC 4.39   HEMOGLOBIN 13.9   HEMATOCRIT  40.4   PLATELETCT 378       Imaging  CT:    Reviewed  MRI:   Reviewed    Assessment/Plan  * Nontraumatic acute hemorrhage of basal ganglia (HCC)- (present on admission)  Assessment & Plan  Secondary to uncontrolled hypertension  Neurology consulted  Strict blood pressure management with goal SBP less than 140  Avoid anticoagulants, SCDs only  Every 2 hour neurochecks  Seizure and aspiration precautions  PT/OT/SLP evaluation  Goal eunatremia, euthermia, euvolemia, euglycemia    Hypertensive emergency- (present on admission)  Assessment & Plan  Resume telmisartan once enteral access obtained  Titrate nicardipine drip for goal SBP less than 140  IV as needed Vasotec, hydralazine, labetalol    Dyslipidemia  Assessment & Plan  Begin low-dose atorvastatin  RD consultation for dietary changes recommendations    Alcohol use  Assessment & Plan  Continue vitamin supplementation  Monitor for alcohol withdrawal syndrome  Eventual alcohol cessation education and resources to be provided when appropriate    Hypokalemia  Assessment & Plan  Replete and monitor closely         VTE:  Contraindicated  Ulcer: Not Indicated  Lines: Braga Catheter  Ongoing indication addressed    I have performed a physical exam and reviewed and updated ROS and Plan today (2/1/2023). In review of yesterday's note (1/31/2023), there are no changes except as documented above.     Patient is critically ill today requiring active management of her hypertensive emergency with associated intracranial hemorrhage including titration of nicardipine drip. High risk of deterioration and worsening vital organ dysfunction and death without the above critical care interventions.    Discussed patient condition and risk of morbidity and/or mortality with Family, RN, RT, Pharmacy, Charge nurse / hot rounds, Patient, and neurology.  Critical care time = 38 minutes in directly providing and coordinating critical care and extensive data review.  No time overlap and excludes  procedures.    Please note that this dictation was created using voice recognition software. I have made every reasonable attempt to correct obvious errors, but there may be errors of grammar and possibly content that I did not discover before finalizing the note.

## 2023-02-01 NOTE — ASSESSMENT & PLAN NOTE
BP goal SBP less than 140  Continue current dose of amlodipine 10 mg daily  IV as needed Vasotec, hydralazine, labetalol

## 2023-02-01 NOTE — PROGRESS NOTES
Report received from [facility] at 2210.     Pt. Arrived via EMS transport at 2315. Vitals at admission were:  Temp: 99.9F  Ht: 172.7cm  Wt: 79.8kg  BP: 152/77  RR: 23  O2    Patient wearing oxymask at 10L. Report received from EMS, four eyes skin assessment performed. Patient given CHG bath, settled in bed, and oriented to the unit and hospital. Patient children (x3) at bedside.

## 2023-02-01 NOTE — DISCHARGE PLANNING
Care Transition Team Assessment    Ms. Pan is a single white female, who is employed as a  and was independent of all ADL's prior to admission, she lived alone, has two adult daughters that state that their mother does not currently have a PCP, advance directives and both daughters deny etoh/drugs/tobacco and abuse regarding their mother.    Information Source  Orientation Level: Oriented to person (Difficult to asses d/t expressive aphasia)  Information Given By: Relative  Informant's Name: Summer  Who is responsible for making decisions for patient? : Adult child    Readmission Evaluation  Is this a readmission?: No    Elopement Risk  Legal Hold: No  Ambulatory or Self Mobile in Wheelchair: No-Not an Elopement Risk  Elopement Risk: Not at Risk for Elopement    Interdisciplinary Discharge Planning  Does Admitting Nurse Feel This Could be a Complex Discharge?: No  Primary Care Physician: none  Lives with - Patient's Self Care Capacity: Alone and Able to Care For Self  Patient or legal guardian wants to designate a caregiver: No  Support Systems: Children  Housing / Facility: [address]  Mobility Issues: Yes  Prior Services: None  Patient Prefers to be Discharged to:: IPR  Assistance Needed: Yes  Durable Medical Equipment: Not Applicable    Discharge Preparedness  What is your plan after discharge?: Uncertain - pending medical team collaboration  What are your discharge supports?: Child  Prior Functional Level: Ambulatory, Independent with Activities of Daily Living    Functional Assesment  Prior Functional Level: Ambulatory, Independent with Activities of Daily Living         Vision / Hearing Impairment  Vision Impairment : No  Hearing Impairment : No         Advance Directive  Advance Directive?: None    Domestic Abuse  Have you ever been the victim of abuse or violence?: No  Physical Abuse or Sexual Abuse: No    Psychological Assessment  History of Substance Abuse: None

## 2023-02-01 NOTE — PROGRESS NOTES
NICOLAS HAWKINS Progress Note      Admit Date: 1/31/2023    Resident(s): Sarkis Camara M.D.   Attending:  NICOLAS BARTHOLOMEW/ Dr. Gonda    Patient ID:    Name:  Anali Pan     YOB: 1965  Age:  57 y.o.  female   MRN:  [id number]    Hospital Course:  57-year-old female with a past medical history significant for hypertension who was last known well at 8 PM tonight when she developed sudden onset of headache and right facial droop.  The patient was brought to [facility] emergency department where she had an NIH scale of 4-5.  CT of the brain showed a left basal ganglia and thalamic hemorrhagic stroke.  The patient was started on nicardipine infusion for systolic blood pressures less than 160.    Consultants:  Critical Care  Neurology    Interval Events:  Drowsy; RAAS-1  Global phasia, down right deviation  Starting low dose Statin  Stopping sodium checks  ECHO pending    MRI Brain: acute subacute hemorrhage in left thalamus and adjacent parietal corona radiata with mass effect on posterior 3rd ventricle with slight midline shift 4-5mm  MRA: normal    Vitals Range last 24h:  Temp:  [37.7 °C (99.9 °F)] 37.7 °C (99.9 °F)  Pulse:  [] 87  Resp:  [12-38] 25  BP: (106-152)/(58-87) 138/79  SpO2:  [89 %-99 %] 94 %      Intake/Output Summary (Last 24 hours) at 2/1/2023 1238  Last data filed at 2/1/2023 1200  Gross per 24 hour   Intake 1633.74 ml   Output 1750 ml   Net -116.26 ml        ROS   Unable to perform ROS: critical illness     PHYSICAL EXAM:  Vitals:    02/01/23 1045 02/01/23 1100 02/01/23 1111 02/01/23 1130   BP: 131/83 (!) 141/87  138/79   Pulse: 79 84  87   Resp: (!) 38 (!) 22  (!) 25   Temp:       TempSrc:       SpO2: 95% 96% 93% 94%   Weight:       Height:        Body mass index is 26.76 kg/m².    O2 therapy: Pulse Oximetry: 94 %, O2 (LPM): 1, O2 Delivery Device: Silicone Nasal Cannula    Date 02/01/23 0700 - 02/02/23 0659   Shift [id number] 5322-9223 1890-0659 24 Hour Total   INTAKE   I.V. 479.7    479.7     Volume (mL) (NS infusion) 479.7   479.7   IV Piggyback 415.2   415.2     Volume (mL) (thiamine (B-1) 200 mg in dextrose 5% 100 mL IVPB) 115.5   115.5     Volume (mL) (potassium chloride (KCL) ivpb 10 mEq) 299.7   299.7   Shift Total 894.8   894.8   OUTPUT   Urine 500   500     Output (mL) (Urethral Catheter Temperature probe 16 Fr.) 500   500   Shift Total 500   500   .8   394.8        Physical Exam  Constitutional:       General: She is sleeping. She is not in acute distress.     Appearance: Normal appearance.   HENT:      Head: Normocephalic.      Mouth/Throat:      Mouth: Mucous membranes are moist.   Eyes:      Extraocular Movements: Extraocular movements intact.   Cardiovascular:      Rate and Rhythm: Regular rhythm. Tachycardia present.      Pulses: Normal pulses.   Pulmonary:      Effort: Pulmonary effort is normal. No respiratory distress.   Abdominal:      General: There is no distension.      Palpations: Abdomen is soft.      Tenderness: There is no abdominal tenderness. There is no guarding or rebound.   Musculoskeletal:         General: Normal range of motion.      Cervical back: Normal range of motion and neck supple. No rigidity.   Skin:     General: Skin is warm and dry.      Capillary Refill: Capillary refill takes less than 2 seconds.   Neurological:      Mental Status: She is easily aroused.      Motor: Motor function is intact.      Comments: Moves left side > right side  Does not follow  Weaker on right side - but difficult to fully assess  Sleeping but easily aroused, opens eyes to voice         Recent Labs     01/31/23 2055 02/01/23  0040 02/01/23  0600   SODIUM 134* 135 137   POTASSIUM 4.2  --  3.3*   CHLORIDE 100*  --  102   CO2 24  --  23   BUN 22*  --  13   CREATININE 0.71  --  0.55   MAGNESIUM  --   --  2.1   CALCIUM 9.5  --  9.0     Recent Labs     01/31/23 2055 02/01/23  0600   ALTSGPT 30 28   ASTSGOT 42* 28   ALKPHOSPHAT 95 96   TBILIRUBIN 0.4 0.6   GLUCOSE 108* 125*      Recent Labs     01/31/23 2055 02/01/23  0600   RBC 4.39 4.26   HEMOGLOBIN 13.9 13.7   HEMATOCRIT 40.4 39.9   PLATELETCT 378 339     Recent Labs     01/31/23 2055 02/01/23  0600   WBC  --  7.0   NEUTSPOLYS 55.4  --    LYMPHOCYTES 30.3  --    MONOCYTES 7.1  --    EOSINOPHILS 6.8*  --    BASOPHILS 0.4  --    ASTSGOT 42* 28   ALTSGPT 30 28   ALKPHOSPHAT 95 96   TBILIRUBIN 0.4 0.6       Meds:   thiamine  200 mg Stopped (02/01/23 0634)    Followed by    [START ON 2/3/2023] thiamine  100 mg      enalaprilat  1.25 mg      hydrALAZINE  10 mg      labetalol  10 mg      PEDS potassium chloride (KCL-PERIPHERAL) IV  10 mEq 100 mL/hr at 02/01/23 1200    atorvastatin  10 mg      senna-docusate  2 Tablet      And    polyethylene glycol/lytes  1 Packet      And    magnesium hydroxide  30 mL      And    bisacodyl  10 mg      NS   80 mL/hr at 02/01/23 1200    LORazepam  2 mg      acetaminophen  650 mg      Or    acetaminophen  650 mg      ondansetron  4 mg      Or    ondansetron  4 mg      MD Alert...Adult ICU Electrolyte Replacement per Pharmacy          Imaging:  MR-BRAIN-WITH & W/O   Final Result         Acute-subacute hemorrhage in the left thalamus and adjacent parietal corona radiata with mass effect upon the posterior third ventricle and slight midline shift measuring 4 to 5 mm. Surrounding edema noted which extends to the cerebral peduncle on the    tectum.      No abnormal vascularity or abnormal enhancement is identified in the vicinity of the hemorrhage to suggest an underlying lesion.      Nonspecific T2 hyperintensities are noted in the periventricular and deep white matter, most likely related to chronic microvascular ischemia.      MR-MRA HEAD-W/O   Final Result      Normal intracranial MRA.      EC-ECHOCARDIOGRAM COMPLETE W/O CONT    (Results Pending)       ASSESSEMENT and PLAN:    * Nontraumatic acute hemorrhage of basal ganglia (HCC)- (present on admission)  Assessment & Plan  SBP <140    PRN:  - Labetalol  -  Hydralazine    Nicardipine gtt     No VTE ppx/antiplatelet   Consider Mannitol 50mg q6 if concern for elevated ICP  Repeat CT head w/out contrast for clinical change  MRI brain w/wo contrast: mass effect on posterior 3rd ventricle with slight midline shift 4-5mm  MRA brain without contrast: unremarkable  Unless traumatic, no indication for seizure prophylaxis  Platelet >100k, INR <1.5  ECHO ordered    Q2hour neuro checks    HOB > 30 degrees  PT/OT/SLP  Avoid lying flat when possible    Dyslipidemia  Assessment & Plan  -Cholesterol 220, ; in context of CVA  -Starting with Atorvastatin 10mg daily  -Consider titrating up to 40mg outpatient as patient tolerates    Alcohol use  Assessment & Plan  5-7 drinks daily per report  IV thiamine  Continuing to monitor for withdrawal    Essential hypertension- (present on admission)  Assessment & Plan  Continue home meds when med rec complete  Downtitrating Cardine gtt  PRNs available      Sarkis Camara M.D.

## 2023-02-01 NOTE — ASSESSMENT & PLAN NOTE
Secondary to uncontrolled hypertension  Neurology consulted  Continue with very strict blood pressure management with goal SBP less than 140  Lovenox DVT prophylaxis begin 2/2  Q4 hour neurochecks  Seizure and aspiration precautions  PT/OT/SLP evaluation  Goal eunatremia (increased salt tabs again today), euthermia, euvolemia (encourage oral hydration, IV fluid bolus), euglycemia

## 2023-02-01 NOTE — PROGRESS NOTES
[facility] INTENSIVIST DIRECT ADMISSION REPORT    Transferring facility: [facility]  Transferring physician: Dr Zamudio  Transferring facility/physician contact number: n/a  Chief complaint: Right facial droop and headache  Pertinent history & patient course: This is a 57-year-old female with a past medical history significant for hypertension who was last known well at 8 PM tonight when she developed sudden onset of headache and right facial droop.  The patient was brought to [facility] emergency department where she had an NIH scale of 4-5.  CT of the brain showed a left basal ganglia and thalamic hemorrhagic stroke.  The patient was started on nicardipine infusion for systolic blood pressures less than 160.  Pertinent imaging & lab results: As above  Code Status: Full code per transferring provider, I personally verified with the transferring provider patient's code status and the transferring provider has confirmed this with the patient.  Further work up or recommendations prior to transfer: No further recommendations  Consultants called prior to transfer and pertinent input from consultants: Neurology and critical care  Patient accepted for transfer: Yes  Consultants to be called upon arrival: Critical care and neurology  Floor requested: [facility] ICU  ADT order placed for accepted patient: Yes    Please inform the Intensivist upon assignment of an ICU bed and then again on arrival of the patient.

## 2023-02-01 NOTE — ASSESSMENT & PLAN NOTE
Continue vitamin supplementation x 5 days  Monitor for alcohol withdrawal syndrome - early development 2/1, improving  Eventual alcohol cessation education and resources provided  Discontinue Precedex gtt this morning

## 2023-02-01 NOTE — CONSULTS
Critical Care History & Physical    Date of consult: 01/31/23    Referring Physician  Mary Fowler M.D.;W*    Reason for Consultation  ICH    History of Presenting Illness  57 y.o. female with a history of HTN, EtOH use presented to [facility] with acute onset headache and right sided facial droop.  Found to have left BG/thalamic hemorrhage, started on nicardipine infusion.  Not anticoagulated.  Reportedly drinks 5-7 drinks daily, no other substance use known.  Family requested transfer from [facility] (see transfer center note and encounter note from ).        Code Status  Full Code    Review of Systems  Review of Systems   Unable to perform ROS: Critical illness     Past Medical History   has a past medical history of Hypertension.    Surgical History   has a past surgical history that includes ovarian cystectomy.    Family History  Reviewed and not pertinent    Social History   reports that she has never smoked. She has never used smokeless tobacco. She reports current alcohol use of about 21.0 oz per week. She reports that she does not use drugs.    Medications  Home Medications    **Home medications have not yet been reviewed for this encounter**         Allergies  No Known Allergies      Vital Signs last 24 hours  Temp:  [37.7 °C (99.9 °F)] 37.7 °C (99.9 °F)  Pulse:  [108] 108  Resp:  [23] 23  BP: (152)/(77) 152/77  SpO2:  [98 %] 98 %      Physical Exam  Physical Exam  Vitals and nursing note reviewed. Exam conducted with a chaperone present.   Constitutional:       General: She is sleeping. She is not in acute distress.     Appearance: Normal appearance.   HENT:      Head: Normocephalic.      Mouth/Throat:      Mouth: Mucous membranes are moist.   Eyes:      Extraocular Movements: Extraocular movements intact.   Cardiovascular:      Rate and Rhythm: Regular rhythm. Tachycardia present.      Pulses: Normal pulses.   Pulmonary:      Effort: Pulmonary effort is normal. No respiratory distress.    Abdominal:      General: There is no distension.      Palpations: Abdomen is soft.      Tenderness: There is no abdominal tenderness. There is no guarding or rebound.   Musculoskeletal:         General: Normal range of motion.      Cervical back: Normal range of motion and neck supple. No rigidity.   Skin:     General: Skin is warm and dry.      Capillary Refill: Capillary refill takes less than 2 seconds.   Neurological:      Mental Status: She is easily aroused.      Motor: Motor function is intact.      Comments: Moves left side > right side  Does not follow  Weaker on right side - but difficult to fully assess  Sleeping but easily aroused, opens eyes to voice         Fluids  No intake or output data in the 24 hours ending 02/01/23 0035      Laboratory  Recent Results (from the past 48 hour(s))   COMPLETE CBC & AUTO DIFF WBC    Collection Time: 01/31/23  8:55 PM   Result Value Ref Range    Leukocytes, Absolute 8.2 3.7 - 10.6 x10E3/uL    RBC 4.39 4.19 - 5.21 x10e6/uL    Hemoglobin 13.9 12.3 - 16.0 g/dL    Hematocrit 40.4 36.0 - 47.0 %    MCV 92.0 81.0 - 99.0 fL    MCH 31.6 28.0 - 33.8 pg    MCHC 34.4 33.1 - 36.5 g/dL    RDW 12.5 11.8 - 14.0 %    Platelet Count 378 146 - 390 x10E3/uL    MPV 7.7 6.4 - 10.2 fL    Neutrophils-Polys 55.4 41.7 - 82.3 %    Lymphocytes 30.3 10.8 - 44.4 %    Monocytes 7.1 5.0 - 12.8 %    Eosinophils 6.8 (H) 0.0 - 6.6 %    Basophils 0.4 0.0 - 1.3 %    Neutrophils (Absolute) 4.50 1.40 - 8.00 x10E3/uL    Lymphs (Absolute) 2.50 1.00 - 5.20 x10E3/uL    Monos (Absolute) 0.60 0.16 - 1.00 x10E3/uL    Eos (Absolute) 0.60 0.00 - 0.80 x10E3/uL    Baso (Absolute) 0.00 0.00 - 0.30 x10E3/uL   COMP METABOLIC PANEL    Collection Time: 01/31/23  8:55 PM   Result Value Ref Range    Sodium 134 (L) 136 - 144 mmol/L    Potassium 4.2 3.6 - 5.1 mmol/L    Chloride 100 (L) 102 - 110 mmol/L    Co2 24 22 - 32 mmol/L    Alkaline Phosphatase 95 38 - 126 U/L    AST(SGOT) 42 (H) 15 - 41 U/L    ALT(SGPT) 30 14 - 54 U/L     Bun 22 (H) 8 - 20 mg/dL    Creatinine 0.71 0.60 - 1.10 mg/dL    Calcium 9.5 8.7 - 10.3 mg/dL    Total Protein 7.6 6.4 - 8.2 g/dL    Albumin 4.6 3.5 - 4.8 g/dL    Ag Ratio 1.5 1.0 - 2.2 ratio    Anion Gap 10 2 - 11 mmol/L    Glom Filt Rate, Est 85 >60 mL/min/1.7    Globulin 3.0 2.6 - 3.1 g/dL    Glucose 108 (H) 60 - 99 mg/dL    Total Bilirubin 0.4 0.4 - 2.0 mg/dL         Imaging  CT-HEAD W/O    (Results Pending)   MR-BRAIN-WITH & W/O    (Results Pending)   MR-MRA HEAD-W/O    (Results Pending)   EC-ECHOCARDIOGRAM COMPLETE W/O CONT    (Results Pending)         Assessment/Plan  * Nontraumatic acute hemorrhage of basal ganglia (HCC)- (present on admission)  Assessment & Plan  Hypertensive etiology  Admit ICU  q1 hour neuro checks    SBP <140    PRN:  - Labetalol  - Hydralazine    Nicardipine gtt     No VTE ppx/antiplatelet   q6 Na - ok for normonatremia currently, if worsening edema / shift on next CT will add 3%  Mannitol 50mg q6 if concern for elevated ICP  Repeat CT head w/out contrast for clinical change  MRI brain w/wo contrast (ich protocol)  MRA brain without contrast  Unless traumatic, no indication for seizure prophylaxis  Platelet >100k, INR <1.5  Echocardiogram    HOB > 30 degrees  PT/OT/SLP  Avoid lying flat when possible    Neurology discussed with OSH on transfer center call, aware of the patient.  Will re-engage them in the morning    Essential hypertension- (present on admission)  Assessment & Plan  Continue home meds when med rec complete  Nicardipine for now  PRNs available    Alcohol use  Assessment & Plan  5-7 drinks daily per report  IV thiamine  Monitor for withdrawal  Add etoh level (though she has been at [facility] for several hours)        DVT prophylaxis: Contraindicated  PUD prophylaxis: NA  Glycemic control: SSI if needed  Nutrition: NPO until swallow eval (RN eval ok, but likely will need SLP)  Lines: NA  Omi: NA     Discussed patient condition and risk of morbidity and/or mortality  with Family, RN, RT, Pharmacy, Code status disscussed, Charge nurse / hot rounds, and Patient.      The patient remains critically ill.  Critical care time = 50 minutes in directly providing and coordinating critical care and extensive data review.  No time overlap and excludes procedures.

## 2023-02-01 NOTE — CARE PLAN
The patient is Watcher - Medium risk of patient condition declining or worsening    Shift Goals  Clinical Goals: stable neuro exam; SBP <140  Patient Goals: DANISH  Family Goals: updates    Progress made toward(s) clinical / shift goals:    Problem: Knowledge Deficit - Standard  Goal: Patient and family/care givers will demonstrate understanding of plan of care, disease process/condition, diagnostic tests and medications  Outcome: Progressing - Discussed care plan with patient and children, discussed unknown prognosis at this time and educated family on the tests being run, likely plan for the next 24 hours, and opportunities to participate in the patient's care. Patient is severely aphasic, but continued to reorient and educate patient as to the procedures and plan of care.      Problem: Neuro Status  Goal: Neuro status will remain stable or improve  Outcome: Progressing - Patient neuro status remained stable for the duration of the shift. Q1 neuro checks were performed, patient was able to follow commands on LUE, had strength in remaining extremities and moved all against gravity, but was only intermittently able to follow commands. Otherwise, movement was non-purposeful.     Problem: Hemodynamic Monitoring  Goal: Patient's hemodynamics, fluid balance and neurologic status will be stable or improve  Outcome: Progressing - Patient arrived maxed on nicardipine, able to wean nicardipine significantly during the shift and maintain SBP goal of <150. No PRN medications were utilized to maintain blood pressure at goal.

## 2023-02-01 NOTE — HOSPITAL COURSE
"\"57 y.o. female with a history of HTN, EtOH use presented to [facility] with acute onset headache and right sided facial droop.  Found to have left BG/thalamic hemorrhage, started on nicardipine infusion.  Not anticoagulated.  Reportedly drinks 5-7 drinks daily, no other substance use known.  Family requested transfer from [facility] (see transfer center note and encounter note from [facility]).\" - Dr. Aguayo 1/31 2/1 - nicardipine gtt, MRI brain without AVM nor mass, ?early ETOH w/d  2/2 - remains on precedex gtt, increased anti-HTN, N/V  "

## 2023-02-01 NOTE — PROGRESS NOTES
4 Eyes Skin Assessment Completed by DOT Correa and Unique, RN.    Head Scar (light, in middle of forehead)  Ears WDL  Nose WDL  Mouth WDL  Neck WDL  Breast/Chest WDL  Shoulder Blades WDL  Spine WDL  (R) Arm/Elbow/Hand WDL  (L) Arm/Elbow/Hand WDL  Abdomen WDL  Groin WDL  Scrotum/Coccyx/Buttocks WDL  (R) Leg WDL  (L) Leg WDL  (R) Heel/Foot/Toe WDL  (L) Heel/Foot/Toe WDL      Devices In Places Blood Pressure Cuff, Pulse Ox, Hicks, SCD's, Oxy Mask, and Nasal Cannula, 3PIV, hicks POA      Interventions In Place NC W/Ear Foams, Sacral Mepilex, Pillows, Low Air Loss Mattress, and Pressure Redistribution Mattress    Possible Skin Injury No    Pictures Uploaded Into Epic N/A  Wound Consult Placed N/A  RN Wound Prevention Protocol Ordered No

## 2023-02-02 ENCOUNTER — APPOINTMENT (OUTPATIENT)
Dept: CARDIOLOGY | Facility: MEDICAL CENTER | Age: 58
DRG: 064 | End: 2023-02-02
Attending: STUDENT IN AN ORGANIZED HEALTH CARE EDUCATION/TRAINING PROGRAM
Payer: COMMERCIAL

## 2023-02-02 PROBLEM — E83.42 HYPOMAGNESEMIA: Status: ACTIVE | Noted: 2023-02-02

## 2023-02-02 LAB
ANION GAP SERPL CALC-SCNC: 12 MMOL/L (ref 7–16)
BUN SERPL-MCNC: 7 MG/DL (ref 8–22)
CALCIUM SERPL-MCNC: 9.2 MG/DL (ref 8.5–10.5)
CHLORIDE SERPL-SCNC: 97 MMOL/L (ref 96–112)
CO2 SERPL-SCNC: 21 MMOL/L (ref 20–33)
CREAT SERPL-MCNC: 0.4 MG/DL (ref 0.5–1.4)
ERYTHROCYTE [DISTWIDTH] IN BLOOD BY AUTOMATED COUNT: 43.8 FL (ref 35.9–50)
GFR SERPLBLD CREATININE-BSD FMLA CKD-EPI: 116 ML/MIN/1.73 M 2
GLUCOSE SERPL-MCNC: 123 MG/DL (ref 65–99)
HCT VFR BLD AUTO: 38.9 % (ref 37–47)
HGB BLD-MCNC: 13.1 G/DL (ref 12–16)
LV EJECT FRACT MOD 2C 99903: 68.37
LV EJECT FRACT MOD 4C 99902: 73.57
LV EJECT FRACT MOD BP 99901: 70.18
MAGNESIUM SERPL-MCNC: 1.8 MG/DL (ref 1.5–2.5)
MCH RBC QN AUTO: 31.6 PG (ref 27–33)
MCHC RBC AUTO-ENTMCNC: 33.7 G/DL (ref 33.6–35)
MCV RBC AUTO: 94 FL (ref 81.4–97.8)
PLATELET # BLD AUTO: 345 K/UL (ref 164–446)
PMV BLD AUTO: 9.4 FL (ref 9–12.9)
POTASSIUM SERPL-SCNC: 3.7 MMOL/L (ref 3.6–5.5)
RBC # BLD AUTO: 4.14 M/UL (ref 4.2–5.4)
SODIUM SERPL-SCNC: 130 MMOL/L (ref 135–145)
WBC # BLD AUTO: 7.6 K/UL (ref 4.8–10.8)

## 2023-02-02 PROCEDURE — 700111 HCHG RX REV CODE 636 W/ 250 OVERRIDE (IP): Performed by: STUDENT IN AN ORGANIZED HEALTH CARE EDUCATION/TRAINING PROGRAM

## 2023-02-02 PROCEDURE — 700111 HCHG RX REV CODE 636 W/ 250 OVERRIDE (IP): Performed by: INTERNAL MEDICINE

## 2023-02-02 PROCEDURE — 700102 HCHG RX REV CODE 250 W/ 637 OVERRIDE(OP): Performed by: STUDENT IN AN ORGANIZED HEALTH CARE EDUCATION/TRAINING PROGRAM

## 2023-02-02 PROCEDURE — 80048 BASIC METABOLIC PNL TOTAL CA: CPT

## 2023-02-02 PROCEDURE — A9270 NON-COVERED ITEM OR SERVICE: HCPCS | Performed by: STUDENT IN AN ORGANIZED HEALTH CARE EDUCATION/TRAINING PROGRAM

## 2023-02-02 PROCEDURE — 700105 HCHG RX REV CODE 258: Performed by: STUDENT IN AN ORGANIZED HEALTH CARE EDUCATION/TRAINING PROGRAM

## 2023-02-02 PROCEDURE — A9270 NON-COVERED ITEM OR SERVICE: HCPCS | Performed by: INTERNAL MEDICINE

## 2023-02-02 PROCEDURE — 85027 COMPLETE CBC AUTOMATED: CPT

## 2023-02-02 PROCEDURE — 770022 HCHG ROOM/CARE - ICU (200)

## 2023-02-02 PROCEDURE — 97167 OT EVAL HIGH COMPLEX 60 MIN: CPT

## 2023-02-02 PROCEDURE — 99291 CRITICAL CARE FIRST HOUR: CPT | Performed by: INTERNAL MEDICINE

## 2023-02-02 PROCEDURE — 700101 HCHG RX REV CODE 250: Performed by: NURSE PRACTITIONER

## 2023-02-02 PROCEDURE — 92526 ORAL FUNCTION THERAPY: CPT

## 2023-02-02 PROCEDURE — 93306 TTE W/DOPPLER COMPLETE: CPT

## 2023-02-02 PROCEDURE — 700102 HCHG RX REV CODE 250 W/ 637 OVERRIDE(OP): Performed by: INTERNAL MEDICINE

## 2023-02-02 PROCEDURE — 99232 SBSQ HOSP IP/OBS MODERATE 35: CPT | Performed by: PSYCHIATRY & NEUROLOGY

## 2023-02-02 PROCEDURE — 93306 TTE W/DOPPLER COMPLETE: CPT | Mod: 26 | Performed by: INTERNAL MEDICINE

## 2023-02-02 PROCEDURE — 97163 PT EVAL HIGH COMPLEX 45 MIN: CPT

## 2023-02-02 PROCEDURE — 700111 HCHG RX REV CODE 636 W/ 250 OVERRIDE (IP): Performed by: NURSE PRACTITIONER

## 2023-02-02 PROCEDURE — 83735 ASSAY OF MAGNESIUM: CPT

## 2023-02-02 RX ORDER — DEXMEDETOMIDINE HYDROCHLORIDE 4 UG/ML
.1-1.5 INJECTION, SOLUTION INTRAVENOUS CONTINUOUS
Status: DISCONTINUED | OUTPATIENT
Start: 2023-02-02 | End: 2023-02-03

## 2023-02-02 RX ORDER — CHLORDIAZEPOXIDE HYDROCHLORIDE 5 MG/1
10 CAPSULE, GELATIN COATED ORAL EVERY 6 HOURS
Status: DISCONTINUED | OUTPATIENT
Start: 2023-02-02 | End: 2023-02-02

## 2023-02-02 RX ORDER — ENOXAPARIN SODIUM 100 MG/ML
40 INJECTION SUBCUTANEOUS DAILY
Status: DISCONTINUED | OUTPATIENT
Start: 2023-02-02 | End: 2023-02-06 | Stop reason: HOSPADM

## 2023-02-02 RX ORDER — ENOXAPARIN SODIUM 100 MG/ML
30 INJECTION SUBCUTANEOUS DAILY
Status: DISCONTINUED | OUTPATIENT
Start: 2023-02-02 | End: 2023-02-02

## 2023-02-02 RX ORDER — AMLODIPINE BESYLATE 5 MG/1
10 TABLET ORAL
Status: DISCONTINUED | OUTPATIENT
Start: 2023-02-03 | End: 2023-02-06 | Stop reason: HOSPADM

## 2023-02-02 RX ORDER — POTASSIUM CHLORIDE 20 MEQ/1
20 TABLET, EXTENDED RELEASE ORAL ONCE
Status: COMPLETED | OUTPATIENT
Start: 2023-02-02 | End: 2023-02-02

## 2023-02-02 RX ORDER — SODIUM CHLORIDE 1 G/1
1 TABLET ORAL
Status: DISCONTINUED | OUTPATIENT
Start: 2023-02-02 | End: 2023-02-03

## 2023-02-02 RX ORDER — POTASSIUM CHLORIDE 7.45 MG/ML
10 INJECTION INTRAVENOUS
Status: COMPLETED | OUTPATIENT
Start: 2023-02-02 | End: 2023-02-02

## 2023-02-02 RX ORDER — TELMISARTAN 80 MG/1
80 TABLET ORAL
Status: DISCONTINUED | OUTPATIENT
Start: 2023-02-02 | End: 2023-02-02

## 2023-02-02 RX ORDER — AMLODIPINE BESYLATE 5 MG/1
5 TABLET ORAL ONCE
Status: COMPLETED | OUTPATIENT
Start: 2023-02-02 | End: 2023-02-02

## 2023-02-02 RX ORDER — MAGNESIUM SULFATE HEPTAHYDRATE 40 MG/ML
2 INJECTION, SOLUTION INTRAVENOUS ONCE
Status: COMPLETED | OUTPATIENT
Start: 2023-02-02 | End: 2023-02-02

## 2023-02-02 RX ORDER — AMLODIPINE BESYLATE 5 MG/1
5 TABLET ORAL
Status: DISCONTINUED | OUTPATIENT
Start: 2023-02-02 | End: 2023-02-02

## 2023-02-02 RX ADMIN — ENOXAPARIN SODIUM 40 MG: 40 INJECTION SUBCUTANEOUS at 17:37

## 2023-02-02 RX ADMIN — LABETALOL HYDROCHLORIDE 10 MG: 5 INJECTION, SOLUTION INTRAVENOUS at 06:30

## 2023-02-02 RX ADMIN — AMLODIPINE BESYLATE 5 MG: 5 TABLET ORAL at 16:14

## 2023-02-02 RX ADMIN — SODIUM CHLORIDE 1 G: 1 TABLET ORAL at 10:24

## 2023-02-02 RX ADMIN — FENTANYL CITRATE 25 MCG: 50 INJECTION, SOLUTION INTRAMUSCULAR; INTRAVENOUS at 03:28

## 2023-02-02 RX ADMIN — AMLODIPINE BESYLATE 5 MG: 5 TABLET ORAL at 14:39

## 2023-02-02 RX ADMIN — ONDANSETRON 4 MG: 2 INJECTION INTRAMUSCULAR; INTRAVENOUS at 08:00

## 2023-02-02 RX ADMIN — POTASSIUM CHLORIDE 10 MEQ: 7.46 INJECTION, SOLUTION INTRAVENOUS at 11:58

## 2023-02-02 RX ADMIN — DEXMEDETOMIDINE 0.2 MCG/KG/HR: 200 INJECTION, SOLUTION INTRAVENOUS at 06:30

## 2023-02-02 RX ADMIN — MAGNESIUM SULFATE HEPTAHYDRATE 2 G: 40 INJECTION, SOLUTION INTRAVENOUS at 08:14

## 2023-02-02 RX ADMIN — THIAMINE HYDROCHLORIDE 200 MG: 100 INJECTION, SOLUTION INTRAMUSCULAR; INTRAVENOUS at 05:55

## 2023-02-02 RX ADMIN — ONDANSETRON 4 MG: 2 INJECTION INTRAMUSCULAR; INTRAVENOUS at 16:30

## 2023-02-02 RX ADMIN — ONDANSETRON 4 MG: 2 INJECTION INTRAMUSCULAR; INTRAVENOUS at 12:23

## 2023-02-02 RX ADMIN — POTASSIUM CHLORIDE 10 MEQ: 7.46 INJECTION, SOLUTION INTRAVENOUS at 10:56

## 2023-02-02 RX ADMIN — THIAMINE HYDROCHLORIDE 200 MG: 100 INJECTION, SOLUTION INTRAMUSCULAR; INTRAVENOUS at 17:41

## 2023-02-02 RX ADMIN — HYDRALAZINE HYDROCHLORIDE 10 MG: 20 INJECTION INTRAMUSCULAR; INTRAVENOUS at 01:10

## 2023-02-02 RX ADMIN — ENALAPRILAT 1.25 MG: 1.25 INJECTION INTRAVENOUS at 08:55

## 2023-02-02 RX ADMIN — ATORVASTATIN CALCIUM 10 MG: 10 TABLET, FILM COATED ORAL at 18:06

## 2023-02-02 RX ADMIN — HYDRALAZINE HYDROCHLORIDE 10 MG: 20 INJECTION INTRAMUSCULAR; INTRAVENOUS at 15:38

## 2023-02-02 RX ADMIN — HYDRALAZINE HYDROCHLORIDE 10 MG: 20 INJECTION INTRAMUSCULAR; INTRAVENOUS at 07:35

## 2023-02-02 RX ADMIN — LABETALOL HYDROCHLORIDE 10 MG: 5 INJECTION, SOLUTION INTRAVENOUS at 00:14

## 2023-02-02 RX ADMIN — SODIUM CHLORIDE 1 G: 1 TABLET ORAL at 17:37

## 2023-02-02 ASSESSMENT — LIFESTYLE VARIABLES
VISUAL DISTURBANCES: NOT PRESENT
TREMOR: *
ANXIETY: MODERATELY ANXIOUS OR GUARDED, SO ANXIETY IS INFERRED
AGITATION: MODERATELY FIDGETY AND RESTLESS
AUDITORY DISTURBANCES: NOT PRESENT
NAUSEA AND VOMITING: *
TOTAL SCORE: 19
HEADACHE, FULLNESS IN HEAD: MILD
PAROXYSMAL SWEATS: BARELY PERCEPTIBLE SWEATING. PALMS MOIST
ORIENTATION AND CLOUDING OF SENSORIUM: DATE DISORIENTATION BY MORE THAN TWO CALENDAR DAYS

## 2023-02-02 ASSESSMENT — COGNITIVE AND FUNCTIONAL STATUS - GENERAL
DRESSING REGULAR UPPER BODY CLOTHING: A LITTLE
TOILETING: A LOT
MOVING FROM LYING ON BACK TO SITTING ON SIDE OF FLAT BED: A LOT
HELP NEEDED FOR BATHING: A LOT
SUGGESTED CMS G CODE MODIFIER MOBILITY: CN
DRESSING REGULAR LOWER BODY CLOTHING: A LOT
HELP NEEDED FOR BATHING: A LOT
SUGGESTED CMS G CODE MODIFIER MOBILITY: CL
WALKING IN HOSPITAL ROOM: A LOT
TOILETING: A LOT
DRESSING REGULAR UPPER BODY CLOTHING: A LOT
DAILY ACTIVITIY SCORE: 12
DRESSING REGULAR LOWER BODY CLOTHING: A LOT
MOVING TO AND FROM BED TO CHAIR: UNABLE
TURNING FROM BACK TO SIDE WHILE IN FLAT BAD: A LITTLE
SUGGESTED CMS G CODE MODIFIER DAILY ACTIVITY: CL
EATING MEALS: A LOT
STANDING UP FROM CHAIR USING ARMS: A LOT
EATING MEALS: A LITTLE
TURNING FROM BACK TO SIDE WHILE IN FLAT BAD: UNABLE
CLIMB 3 TO 5 STEPS WITH RAILING: A LOT
PERSONAL GROOMING: A LITTLE
MOVING FROM LYING ON BACK TO SITTING ON SIDE OF FLAT BED: UNABLE
MOBILITY SCORE: 6
SUGGESTED CMS G CODE MODIFIER DAILY ACTIVITY: CK
MOBILITY SCORE: 13
WALKING IN HOSPITAL ROOM: TOTAL
MOVING TO AND FROM BED TO CHAIR: A LOT
STANDING UP FROM CHAIR USING ARMS: TOTAL
PERSONAL GROOMING: A LOT
CLIMB 3 TO 5 STEPS WITH RAILING: TOTAL
DAILY ACTIVITIY SCORE: 15

## 2023-02-02 ASSESSMENT — PAIN DESCRIPTION - PAIN TYPE
TYPE: ACUTE PAIN

## 2023-02-02 ASSESSMENT — ACTIVITIES OF DAILY LIVING (ADL): TOILETING: INDEPENDENT

## 2023-02-02 ASSESSMENT — PATIENT HEALTH QUESTIONNAIRE - PHQ9
2. FEELING DOWN, DEPRESSED, IRRITABLE, OR HOPELESS: NOT AT ALL
SUM OF ALL RESPONSES TO PHQ9 QUESTIONS 1 AND 2: 0
1. LITTLE INTEREST OR PLEASURE IN DOING THINGS: NOT AT ALL

## 2023-02-02 ASSESSMENT — GAIT ASSESSMENTS: GAIT LEVEL OF ASSIST: UNABLE TO PARTICIPATE

## 2023-02-02 NOTE — CARE PLAN
The patient is Watcher - Medium risk of patient condition declining or worsening    Shift Goals  Clinical Goals: SBP <140, stable neuro  Patient Goals: DANISH  Family Goals: updates, pt to get rest    Progress made toward(s) clinical / shift goals:    Problem: Knowledge Deficit - Standard  Goal: Patient and family/care givers will demonstrate understanding of plan of care, disease process/condition, diagnostic tests and medications  Outcome: Progressing     Problem: Fall Risk  Goal: Patient will remain free from falls  Outcome: Progressing     Problem: Neuro Status  Goal: Neuro status will remain stable or improve  Outcome: Progressing

## 2023-02-02 NOTE — PROGRESS NOTES
"Critical Care Progress Note    Date of admission  1/31/2023    Chief Complaint  57 y.o. female admitted 1/31/2023 with [facility]    Hospital Course  \"57 y.o. female with a history of HTN, EtOH use presented to [facility] with acute onset headache and right sided facial droop.  Found to have left BG/thalamic hemorrhage, started on nicardipine infusion.  Not anticoagulated.  Reportedly drinks 5-7 drinks daily, no other substance use known.  Family requested transfer from [facility] (see transfer center note and encounter note from TC).\" - Dr. Aguayo 1/31 2/1 - nicardipine gtt, MRI brain without AVM nor mass, ?early ETOH w/d    Interval Problem Update  Reviewed last 24 hour events:   - started on librium (d/c'd) and precedex for ETOH w/d, thiamine   - AF   - HTN needing several prn anti-HTNs   - low K, Mag   - NSR, -150   - confused, ongoing R sided weakness and neglect, facial droop   - Na down to 130 (started salt tabs), d/c NS MIVFs   - PT/OT   - passed SLP, nazia diet    Review of Systems  Review of Systems   Unable to perform ROS: Mental status change      Vital Signs for last 24 hours   Pulse:  [69-94] 76  Resp:  [10-50] 28  BP: (117-164)/(72-98) 161/84  SpO2:  [89 %-99 %] 99 %    Respiratory Information for the last 24 hours   2 lpm n/c    Physical Exam   Physical Exam  Vitals and nursing note reviewed.   Constitutional:       General: She is awake. She is not in acute distress.     Appearance: Normal appearance. She is well-developed. She is not diaphoretic.      Interventions: She is restrained. Nasal cannula in place.   HENT:      Head: Normocephalic and atraumatic.      Nose: Nose normal.      Mouth/Throat:      Mouth: Mucous membranes are moist.      Pharynx: Oropharynx is clear.   Eyes:      Extraocular Movements: Extraocular movements intact.      Conjunctiva/sclera: Conjunctivae normal.      Pupils: Pupils are equal, round, and reactive to light.   Neck:      Vascular: No JVD.      Trachea: No " tracheal deviation.   Cardiovascular:      Rate and Rhythm: Normal rate and regular rhythm. Occasional Extrasystoles are present.     Chest Wall: PMI is displaced.      Pulses: Normal pulses.      Heart sounds: Normal heart sounds. No murmur heard.     Comments: Remains hypertensive  Pulmonary:      Effort: Pulmonary effort is normal. No accessory muscle usage or respiratory distress.      Breath sounds: Normal breath sounds. No wheezing, rhonchi or rales.      Comments: Strong cough, protecting airway  Abdominal:      General: Bowel sounds are normal. There is no distension.      Palpations: Abdomen is soft.      Tenderness: There is no abdominal tenderness. There is no guarding or rebound.   Genitourinary:     Comments: Braga catheter in place  Musculoskeletal:         General: No tenderness.      Cervical back: Neck supple. No tenderness.      Right lower leg: No edema.      Left lower leg: No edema.   Skin:     General: Skin is warm and dry.      Capillary Refill: Capillary refill takes less than 2 seconds.      Coloration: Skin is not pale.   Neurological:      Mental Status: She is alert.      Comments: Persistent right-sided weakness worse in the upper than the lower extremity, follows commands on the left but has some expressive aphasia, right facial droop, agitated and pulling out catheters at times   Psychiatric:         Behavior: Behavior is cooperative.       Medications  Current Facility-Administered Medications   Medication Dose Route Frequency Provider Last Rate Last Admin    chlordiazePOXIDE (LIBRIUM) capsule 10 mg  10 mg Oral Q6HRS Tracy Chin        dexmedetomidine (PRECEDEX) 400 mcg/100mL NS premix infusion  0.1-1.5 mcg/kg/hr (Ideal) Intravenous Continuous Tracy Castona 3.2 mL/hr at 02/02/23 0630 0.2 mcg/kg/hr at 02/02/23 0630    thiamine (B-1) 200 mg in dextrose 5% 100 mL IVPB  200 mg Intravenous BID Domingo Aguayo M.D. 200 mL/hr at 02/02/23 0555 200 mg at 02/02/23 0555    Followed by     [START ON 2/3/2023] thiamine (B-1) IVPB 100 mg in 100 mL D5W (premix)  100 mg Intravenous DAILY Domingo Aguayo M.D.        enalaprilat (Vasotec) injection 1.25 mg 1 mL  1.25 mg Intravenous Q6HRS PRN Domingo Aguayo M.D.        hydrALAZINE (APRESOLINE) injection 10 mg  10 mg Intravenous Q4HRS PRN Domingo Aguayo M.D.   10 mg at 02/02/23 0110    labetalol (NORMODYNE/TRANDATE) injection 10 mg  10 mg Intravenous Q4HRS PRN Domingo Aguayo M.D.   10 mg at 02/02/23 0630    atorvastatin (LIPITOR) tablet 10 mg  10 mg Oral Q EVENING Sarkis Camara M.D.   10 mg at 02/01/23 1717    senna-docusate (PERICOLACE or SENOKOT S) 8.6-50 MG per tablet 2 Tablet  2 Tablet Oral BID Domingo Aguayo M.D.   2 Tablet at 02/01/23 1717    And    polyethylene glycol/lytes (MIRALAX) PACKET 1 Packet  1 Packet Oral QDAY PRN Domingo Aguayo M.D.        And    magnesium hydroxide (MILK OF MAGNESIA) suspension 30 mL  30 mL Oral QDAY PRN Domingo Aguayo M.D.        And    bisacodyl (DULCOLAX) suppository 10 mg  10 mg Rectal QDAY PRN Domingo Aguayo M.D.        NS infusion   Intravenous Continuous Domingo Aguayo M.D.   Paused at 02/01/23 1759    LORazepam (ATIVAN) injection 2 mg  2 mg Intravenous Q5 MIN PRN Domingo Aguayo M.D.        acetaminophen (Tylenol) tablet 650 mg  650 mg Oral Q4HRS PRN Domingo Aguayo M.D.   650 mg at 02/01/23 1721    Or    acetaminophen (TYLENOL) suppository 650 mg  650 mg Rectal Q4HRS PRN Domingo Aguayo M.D.        ondansetron (ZOFRAN ODT) dispertab 4 mg  4 mg Oral Q4HRS PRN Domingo Aguayo M.D.        Or    ondansetron (ZOFRAN) syringe/vial injection 4 mg  4 mg Intravenous Q4HRS PRBETH Aguayo M.D.   4 mg at 02/01/23 1827    MD Alert...ICU Electrolyte Replacement per Pharmacy   Other PHARMACY TO DOSE Domingo Aguayo M.D.           Fluids    Intake/Output Summary (Last 24 hours) at 2/2/2023 0640  Last data filed at 2/2/2023 0600  Gross per 24 hour   Intake 3277.67 ml   Output 2053 ml   Net 1224.67 ml         Laboratory           Recent Labs     01/31/23 2055 02/01/23  0040 02/01/23 0600 02/02/23  0350   SODIUM 134* 135 137  --    POTASSIUM 4.2  --  3.3*  --    CHLORIDE 100*  --  102  --    CO2 24  --  23  --    BUN 22*  --  13  --    CREATININE 0.71  --  0.55  --    MAGNESIUM  --   --  2.1 1.8   CALCIUM 9.5  --  9.0  --        Recent Labs     01/31/23 2055 02/01/23 0600   ALTSGPT 30 28   ASTSGOT 42* 28   ALKPHOSPHAT 95 96   TBILIRUBIN 0.4 0.6   GLUCOSE 108* 125*       Recent Labs     01/31/23 2055 02/01/23 0600 02/02/23  0350   WBC  --  7.0 7.6   NEUTSPOLYS 55.4  --   --    LYMPHOCYTES 30.3  --   --    MONOCYTES 7.1  --   --    EOSINOPHILS 6.8*  --   --    BASOPHILS 0.4  --   --    ASTSGOT 42* 28  --    ALTSGPT 30 28  --    ALKPHOSPHAT 95 96  --    TBILIRUBIN 0.4 0.6  --        Recent Labs     01/31/23 2055 02/01/23 0600 02/02/23  0350   RBC 4.39 4.26 4.14*   HEMOGLOBIN 13.9 13.7 13.1   HEMATOCRIT 40.4 39.9 38.9   PLATELETCT 378 339 345         Imaging  CT:    Reviewed  MRI:   Reviewed    Assessment/Plan  * Nontraumatic acute hemorrhage of basal ganglia (HCC)- (present on admission)  Assessment & Plan  Secondary to uncontrolled hypertension  Neurology consulted  Continue with very strict blood pressure management with goal SBP less than 140  Okay with DVT prophylaxis today monitoring closely  Continue every 2 hour neurochecks for the next 24 hours  Seizure and aspiration precautions  PT/OT/SLP evaluation  Goal eunatremia (begin salt tabs today), euthermia, euvolemia (discontinue IV fluids, encourage oral hydration), euglycemia    Hypertensive emergency- (present on admission)  Assessment & Plan  Resume outpatient dose of telmisartan today  Resume and continue to titrate nicardipine drip for tight BP goal SBP less than 140  IV as needed Vasotec, hydralazine, labetalol  Amlodipine    Dyslipidemia  Assessment & Plan  Continue with low-dose atorvastatin  RD consulted    Alcohol use  Assessment & Plan  Continue vitamin supplementation  x 5 days  Monitor for alcohol withdrawal syndrome - early development 2/1  Eventual alcohol cessation education and resources to be provided when appropriate  Continue to titrate Precedex gtt, d/c librium to prevent clouding neuro exam  If further escalating withdrawal symptoms, consider gabapentin +/- clonidine in addition to dex gtt    Hypomagnesemia  Assessment & Plan  Replete and monitor closely    Hypokalemia  Assessment & Plan  Replete again today and monitor         VTE:  Lovenox  Ulcer: Not Indicated  Lines: Braga Catheter  Ongoing indication addressed    I have performed a physical exam and reviewed and updated ROS and Plan today (2/2/2023). In review of yesterday's note (2/1/2023), there are no changes except as documented above.     Patient remains critically ill today requiring active management of her intracranial hemorrhage including blood pressure management titrating nicardipine drip as well as Precedex drip for agitation control and possible alcohol withdrawal syndrome. High risk of deterioration and worsening vital organ dysfunction and death without the above critical care interventions.    Discussed patient condition and risk of morbidity and/or mortality with Family, RN, RT, Pharmacy, Charge nurse / hot rounds, Patient, and neurology.  Critical care time = 35 minutes in directly providing and coordinating critical care and extensive data review.  No time overlap and excludes procedures.    Please note that this dictation was created using voice recognition software. I have made every reasonable attempt to correct obvious errors, but there may be errors of grammar and possibly content that I did not discover before finalizing the note.

## 2023-02-02 NOTE — CARE PLAN
Problem: Knowledge Deficit - Standard  Goal: Patient and family/care givers will demonstrate understanding of plan of care, disease process/condition, diagnostic tests and medications  Outcome: Progressing     Problem: Fall Risk  Goal: Patient will remain free from falls  Outcome: Progressing     Problem: Optimal Care of the Stroke Patient  Goal: Optimal emergency care for the stroke patient  Outcome: Progressing  Goal: Optimal acute care for the stroke patient  Outcome: Progressing     Problem: Knowledge Deficit - Stroke Education  Goal: Patient's knowledge of stroke and risk factors will improve  Outcome: Progressing     Problem: Psychosocial - Patient Condition  Goal: Patient's ability to verbalize feelings about condition will improve  Outcome: Progressing  Goal: Patient's ability to re-evaluate and adapt role responsibilities will improve  Outcome: Progressing     Problem: Discharge Planning - Stroke  Goal: Ensure Stroke Core Measures are met prior to discharge  Outcome: Progressing  Goal: Patient’s continuum of care needs will be met  Outcome: Progressing     Problem: Neuro Status  Goal: Neuro status will remain stable or improve  Outcome: Progressing  Expressive aphasia improving, oriented x4.      Problem: Hemodynamic Monitoring  Goal: Patient's hemodynamics, fluid balance and neurologic status will be stable or improve  Outcome: Progressing     Problem: Respiratory - Stroke Patient  Goal: Patient will achieve/maintain optimum respiratory rate/effort  Outcome: Progressing     Problem: Dysphagia  Goal: Dysphagia will improve  Outcome: Progressing  Passed SPL swallow exam      Problem: Risk for Aspiration  Goal: Patient's risk for aspiration will be absent or decrease  Outcome: Progressing     Problem: Urinary Elimination  Goal: Establish and maintain regular urinary output  Outcome: Progressing     Problem: Bowel Elimination  Goal: Establish and maintain regular bowel function  Outcome: Progressing      Problem: Mobility - Stroke  Goal: Patient's capacity to carry out activities will improve  Outcome: Progressing  Goal: Spasticity will be prevented or improved  Outcome: Progressing  Goal: Subluxation will be prevented or improved  Outcome: Progressing     Problem: Self Care  Goal: Patient will have the ability to perform ADLs independently or with assistance (bathe, groom, dress, toilet and feed)  Outcome: Progressing     Problem: Skin Integrity  Goal: Skin integrity is maintained or improved  Outcome: Progressing     Problem: Pain - Standard  Goal: Alleviation of pain or a reduction in pain to the patient’s comfort goal  Outcome: Progressing   The patient is Watcher - Medium risk of patient condition declining or worsening    Shift Goals  Clinical Goals: SBP <140, neuro exam stable  Patient Goals: DANISH  Family Goals: updates    Progress made toward(s) clinical / shift goals:  SBP <140 with PRN Hydralazine and Labetalol, neuro exam stable    Patient is not progressing towards the following goals:

## 2023-02-02 NOTE — PROGRESS NOTES
Neurology Progress Note  Neurohospitalist Service, [facility]    Referring Physician: Jeremy M Gonda, M.D.      Interval History: No acute events overnight.  Stable exam.  Escalating blood pressures, concerns for emerging EtOH withdrawal.    Review of systems: In addition to what is detailed in the HPI and/or updated in the interval history, all other systems reviewed and are negative.    Past Medical History, Past Surgical History and Social History reviewed and unchanged from prior    Medications:    Current Facility-Administered Medications:     dexmedetomidine (PRECEDEX) 400 mcg/100mL NS premix infusion, 0.1-1.5 mcg/kg/hr (Ideal), Intravenous, Continuous, Tracy Chin, Last Rate: 3.2 mL/hr at 02/02/23 0630, 0.2 mcg/kg/hr at 02/02/23 0630    sodium chloride (SALT) tablet 1 g, 1 g, Oral, TID WITH MEALS, Jeremy M Gonda, M.D.    magnesium sulfate IVPB premix 2 g, 2 g, Intravenous, Once, Jeremy M Gonda, M.D.    potassium chloride SA (Kdur) tablet 20 mEq, 20 mEq, Oral, Once, Jeremy M Gonda, M.D.    thiamine (B-1) 200 mg in dextrose 5% 100 mL IVPB, 200 mg, Intravenous, BID, Last Rate: 200 mL/hr at 02/02/23 0555, 200 mg at 02/02/23 0555 **FOLLOWED BY** [START ON 2/3/2023] thiamine (B-1) IVPB 100 mg in 100 mL D5W (premix), 100 mg, Intravenous, DAILY, Domingo Aguayo M.D.    enalaprilat (Vasotec) injection 1.25 mg 1 mL, 1.25 mg, Intravenous, Q6HRS PRN, Domingo Aguayo M.D.    hydrALAZINE (APRESOLINE) injection 10 mg, 10 mg, Intravenous, Q4HRS PRN, Domingo Aguayo M.D., 10 mg at 02/02/23 0735    labetalol (NORMODYNE/TRANDATE) injection 10 mg, 10 mg, Intravenous, Q4HRS PRN, Domingo Aguayo M.D., 10 mg at 02/02/23 0630    atorvastatin (LIPITOR) tablet 10 mg, 10 mg, Oral, Q EVENING, Sarkis Camara M.D., 10 mg at 02/01/23 1717    senna-docusate (PERICOLACE or SENOKOT S) 8.6-50 MG per tablet 2 Tablet, 2 Tablet, Oral, BID, 2 Tablet at 02/01/23 1717 **AND** polyethylene glycol/lytes (MIRALAX) PACKET 1  "Packet, 1 Packet, Oral, QDAY PRN **AND** magnesium hydroxide (MILK OF MAGNESIA) suspension 30 mL, 30 mL, Oral, QDAY PRN **AND** bisacodyl (DULCOLAX) suppository 10 mg, 10 mg, Rectal, QDAY PRN, Domingo Aguayo M.D.    NS infusion, , Intravenous, Continuous, Domingo Aguayo M.D., Paused at 02/01/23 1759    LORazepam (ATIVAN) injection 2 mg, 2 mg, Intravenous, Q5 MIN PRN, Domingo Aguayo M.D.    acetaminophen (Tylenol) tablet 650 mg, 650 mg, Oral, Q4HRS PRN, 650 mg at 02/01/23 1721 **OR** acetaminophen (TYLENOL) suppository 650 mg, 650 mg, Rectal, Q4HRS PRN, Domingo Aguayo M.D.    ondansetron (ZOFRAN ODT) dispertab 4 mg, 4 mg, Oral, Q4HRS PRN **OR** ondansetron (ZOFRAN) syringe/vial injection 4 mg, 4 mg, Intravenous, Q4HRS PRN, Domingo Aguayo M.D., 4 mg at 02/01/23 1827    MD Alert...ICU Electrolyte Replacement per Pharmacy, , Other, PHARMACY TO DOSE, Domingo Aguayo M.D.    Physical Examination:   BP (!) 161/84   Pulse 76   Temp 37.7 °C (99.9 °F) (Temporal)   Resp (!) 28   Ht 1.727 m (5' 7.99\")   Wt 79.8 kg (175 lb 14.8 oz)   LMP  (LMP Unknown)   SpO2 99%   BMI 26.76 kg/m²       General: Patient is lethargic, but easily arouses  Neck: There is normal range of motion  CV: Regular rate   Extremities:  Warm, dry, and intact, without peripheral lower extremity edema    NEUROLOGICAL EXAM:     Mental status: Awake, interactive  Speech and language: Speech is clear and fluent. The patient is able to name and repeat  Cranial nerve exam: Visual fields are full to threat. There is no nystagmus. Extraocular muscles are intact. Slight R face droop.   Motor exam: There is sustained antigravity with no downward drift in L arm and leg.  R arm antigravity, but with drift.  R leg with no antigravity strength.  Sensory exam:  Appears diminished in R hemibody  Coordination: Severe dysmetria with RUE      Objective Data:    Labs:  No results found for: PROTHROMBTM, INR   Lab Results   Component Value Date/Time    WBC 7.6 02/02/2023 " 03:50 AM    RBC 4.14 (L) 02/02/2023 03:50 AM    HEMOGLOBIN 13.1 02/02/2023 03:50 AM    HEMATOCRIT 38.9 02/02/2023 03:50 AM    MCV 94.0 02/02/2023 03:50 AM    MCH 31.6 02/02/2023 03:50 AM    MCHC 33.7 02/02/2023 03:50 AM    MPV 9.4 02/02/2023 03:50 AM    NEUTSPOLYS 55.4 01/31/2023 08:55 PM    NEUTSPOLYS 53 01/30/2018 07:55 AM    LYMPHOCYTES 30.3 01/31/2023 08:55 PM    LYMPHOCYTES 31 01/30/2018 07:55 AM    MONOCYTES 7.1 01/31/2023 08:55 PM    MONOCYTES 9 01/30/2018 07:55 AM    EOSINOPHILS 6.8 (H) 01/31/2023 08:55 PM    EOSINOPHILS 5 01/30/2018 07:55 AM    BASOPHILS 0.4 01/31/2023 08:55 PM    BASOPHILS 2 01/30/2018 07:55 AM      Lab Results   Component Value Date/Time    SODIUM 130 (L) 02/02/2023 06:00 AM    POTASSIUM 3.7 02/02/2023 06:00 AM    CHLORIDE 97 02/02/2023 06:00 AM    CO2 21 02/02/2023 06:00 AM    GLUCOSE 123 (H) 02/02/2023 06:00 AM    BUN 7 (L) 02/02/2023 06:00 AM    CREATININE 0.40 (L) 02/02/2023 06:00 AM    BUNCREATRAT 22 01/30/2018 07:55 AM    GLOMRATE 85 01/31/2023 08:55 PM      Lab Results   Component Value Date/Time    CHOLSTRLTOT 220 (H) 02/01/2023 12:40 AM     (H) 02/01/2023 12:40 AM    HDL 68 02/01/2023 12:40 AM    TRIGLYCERIDE 67 02/01/2023 12:40 AM       Lab Results   Component Value Date/Time    ALKPHOSPHAT 96 02/01/2023 06:00 AM    ASTSGOT 28 02/01/2023 06:00 AM    ALTSGPT 28 02/01/2023 06:00 AM    TBILIRUBIN 0.6 02/01/2023 06:00 AM        Imaging/Testing:    I interpreted and/or reviewed the patient's neuroimaging    MR-BRAIN-WITH & W/O   Final Result         Acute-subacute hemorrhage in the left thalamus and adjacent parietal corona radiata with mass effect upon the posterior third ventricle and slight midline shift measuring 4 to 5 mm. Surrounding edema noted which extends to the cerebral peduncle on the    tectum.      No abnormal vascularity or abnormal enhancement is identified in the vicinity of the hemorrhage to suggest an underlying lesion.      Nonspecific T2 hyperintensities  are noted in the periventricular and deep white matter, most likely related to chronic microvascular ischemia.      MR-MRA HEAD-W/O   Final Result      Normal intracranial MRA.      EC-ECHOCARDIOGRAM COMPLETE W/O CONT    (Results Pending)       Assessment and Plan:  Anali Pan is a 57 year old woman with hypertension, presenting with R side weakness, found to have an acute L thalamic ICH extending to the L cerebral peduncle.  CT angiogram and contrast MRI brain without evidence of underlying tumor or vascular lesion.  Hemorrhage etiology most likely hypertension.  She had escalating blood pressures overnight- which is likely her underlying hypertension and evolving alcohol withdrawal.  Will remain in ICU for withdrawal and blood pressure management.  Fortunately hemorrhage is stable on serial imaging, and overall burden is small- so does not warrant hyperosmolar therapy or surgical decompression.    Problem list:  L thalamic ICH  Hypertension  EtOH use    Plan:   - continue q2h neurochecks   - SBP goal 100-140 STRICT- continue titrate PO medications, cardene, IV anti-HTN PRN   - Na goal 135-145, Na 130 this AM, started salt tabs   - maintain normothermia, net even I/O, FSBS    - ok to start lovenox SQ for DVT chemoppx today   - PT/OT/SLP   - dex infusion for alcohol withdrawal, on thiamine, if further escalating withdrawal symptoms, consider gabapentin 800mg TID in addition to dex    The evaluation of the patient, and recommended management, was discussed with the resident staff. I have performed a physical exam and reviewed and updated ROS and Plan today (2/2/2023).     Erick Olson MD  Neurohospitalist, Acute Care Services

## 2023-02-02 NOTE — CARE PLAN
The patient is Stable - Low risk of patient condition declining or worsening    Shift Goals  Clinical Goals: SBP <140  Patient Goals: Control Nausea  Family Goals: updates, pt to get rest    Progress made toward(s) clinical / shift goals:    Problem: Knowledge Deficit - Standard  Goal: Patient and family/care givers will demonstrate understanding of plan of care, disease process/condition, diagnostic tests and medications  Outcome: Progressing     Problem: Fall Risk  Goal: Patient will remain free from falls  Outcome: Progressing     Problem: Optimal Care of the Stroke Patient  Goal: Optimal emergency care for the stroke patient  Outcome: Progressing  Goal: Optimal acute care for the stroke patient  Outcome: Progressing     Problem: Knowledge Deficit - Stroke Education  Goal: Patient's knowledge of stroke and risk factors will improve  Outcome: Progressing     Problem: Psychosocial - Patient Condition  Goal: Patient's ability to verbalize feelings about condition will improve  Outcome: Progressing     Problem: Neuro Status  Goal: Neuro status will remain stable or improve  Outcome: Progressing     Problem: Pain - Standard  Goal: Alleviation of pain or a reduction in pain to the patient’s comfort goal  Outcome: Progressing

## 2023-02-02 NOTE — THERAPY
Speech Language Pathology  Daily Treatment     Patient Name: Anali Pan  Age:  57 y.o., Sex:  female  Medical Record #: [id number]  Today's Date: 2/2/2023     Precautions  Precautions: Fall Risk, Swallow Precautions ( See Comments)    Assessment    Patient was seen for dysphagia management/high follow up from initial evaluation done yesterday, 2/1. Oral mech exam repeated with improvement in facial symmetry/weakness and no significant changes noted. Pt seen with breakfast tray of minced & moist solids and thin liquids. Patient was able to feed herself. Patient with appropriate oral acceptance marked by adequate labial seal to contain the bolus orally. Adequate bolus formation and oral clearance noted. No coughing or throat clearing with sips of thin liquids via cup/straw. Pt was self-limiting with PO intake, suspect due to lethargy and dislike of food items on meal tray. No overt s/sx of aspiration appreciated with limited PO intake. SLP will continue to monitor closely for any difficulties or change in respiratory status. Continue with modified diet at this time. An instrumental swallow study may be indicated pending clinical progress and if in alignment with GOC.      Recommendations  1.  Minced and moist with thin liquids  2.  Instrumentation: FEES, Instrumental swallow study pending clinical progress  3.  Swallowing Instructions & Precautions:   Supervision: 1:1 feeding with constant supervision, Encourage self-feeding  Positioning: Fully upright and midline during oral intake  Medication: Whole with puree, Crush with applesauce or puree, as appropriate  Strategies: Small bites/sips, Slow rate of intake  Oral Care: Q6h  4.  HOLD with lethargy or with difficulty. If persistent s/sx concerning for dysphagia occur, please hold tray and contact SLP.        Plan    Continue current treatment plan.    Discharge Recommendations: Recommend post-acute placement for additional speech therapy services prior to discharge  "home         Objective       02/02/23 1243   Cognitive-Linguistic   Level of Consciousness Alert   Dysphagia    Dysphagia X   Positioning / Behavior Modification Self Monitoring;Modulate Rate or Bite Size   Other Treatments tx with breakfast tray of MM/TN   Diet / Liquid Recommendation Minced & Moist (5) - (Dysphagia II);Thin (0)   Nutritional Liquid Intake Rating Scale Non thickened beverages   Nutritional Food Intake Rating Scale Total oral diet with multiple consistencies but requiring special preparation or compensations   Nursing Communication Swallow Precaution Sign Posted at Head of Bed   Skilled Intervention Compensatory Strategies;Verbal Cueing   Recommended Route of Medication Administration   Medication Administration  Float Whole with Puree;Other (See Comments)  (or applesauce)   Patient / Family Goals   Patient / Family Goal #1 Dtr - \"Let's see how she does.\"   Goal #1 Outcome Progressing as expected   Short Term Goals   Short Term Goal # 1 Pt will consume diet of MM/TN given 1:1 spv from staff with no overt s/sx of aspiraiton or worsening of lung status.   Goal Outcome # 1 Progressing as expected       "

## 2023-02-02 NOTE — PROGRESS NOTES
[facility] Progress Note      Admit Date: 1/31/2023    Resident(s): Sarkis Camara M.D.   Attending:  NICOLAS BARTHOLOMEW/ Dr. Gonda    Patient ID:    Name:  Anali Pan     YOB: 1965  Age:  57 y.o.  female   MRN:  [id number]    Hospital Course:  57-year-old female with a past medical history significant for hypertension who was last known well at 8 PM tonight when she developed sudden onset of headache and right facial droop.  The patient was brought to [facility] emergency department where she had an NIH scale of 4-5.  CT of the brain showed a left basal ganglia and thalamic hemorrhagic stroke.  The patient was started on nicardipine infusion for systolic blood pressures less than 160.    Consultants:  Critical Care  Neurology    Interval Events:  Started low dose Statin  ECHO: LVEF 70%; evidence of intracardiac shunt (ASD or PFO)  Q2 Neuro checks  Goal BP  Started Lovnox SQ dvt pxx  Dex infusion    Vitals Range last 24h:  Pulse:  [66-94] 74  Resp:  [10-50] 25  BP: (125-169)/(73-98) 125/82  SpO2:  [91 %-99 %] 96 %      Intake/Output Summary (Last 24 hours) at 2/2/2023 1247  Last data filed at 2/2/2023 1200  Gross per 24 hour   Intake 2690.18 ml   Output 1933 ml   Net 757.18 ml        ROS   Unable to perform ROS: critical illness     PHYSICAL EXAM:  Vitals:    02/02/23 1030 02/02/23 1100 02/02/23 1130 02/02/23 1200   BP: 131/80 125/78 126/84 125/82   Pulse: 69 66 80 74   Resp: 16 15 (!) 24 (!) 25   Temp:       TempSrc:       SpO2:  97% 97% 96%   Weight:       Height:        Body mass index is 26.76 kg/m².    O2 therapy: Pulse Oximetry: 96 %, O2 (LPM): 2, O2 Delivery Device: Silicone Nasal Cannula    Date 02/02/23 0700 - 02/03/23 0659   Shift 6738-8609 [id number] [id number] 24 Hour Total   INTAKE   P.O. 50   50     P.O. 50   50   I.V. 155.2   155.2     Magnesium Sulfate Volume 48.6   48.6     Precedex Volume 17.4   17.4     Volume (mL) (NS infusion) 89.2   89.2   IV Piggyback 102.2   102.2     Volume  (mL) (potassium chloride (KCL) ivpb 10 mEq) 102.2   102.2   Shift Total 307.3   307.3   OUTPUT   Urine 230   230     Output (mL) (Urethral Catheter Temperature probe 16 Fr.) 230   230   Emesis 350   350     Emesis 350   350   Shift Total 580   580   NET -272.7   -272.7        Physical Exam        Recent Labs     01/31/23 2055 02/01/23  0040 02/01/23 0600 02/02/23 0350 02/02/23 0600   SODIUM 134* 135 137  --  130*   POTASSIUM 4.2  --  3.3*  --  3.7   CHLORIDE 100*  --  102  --  97   CO2 24  --  23  --  21   BUN 22*  --  13  --  7*   CREATININE 0.71  --  0.55  --  0.40*   MAGNESIUM  --   --  2.1 1.8  --    CALCIUM 9.5  --  9.0  --  9.2     Recent Labs     01/31/23 2055 02/01/23 0600 02/02/23 0600   ALTSGPT 30 28  --    ASTSGOT 42* 28  --    ALKPHOSPHAT 95 96  --    TBILIRUBIN 0.4 0.6  --    GLUCOSE 108* 125* 123*     Recent Labs     01/31/23 2055 02/01/23 0600 02/02/23 0350   RBC 4.39 4.26 4.14*   HEMOGLOBIN 13.9 13.7 13.1   HEMATOCRIT 40.4 39.9 38.9   PLATELETCT 378 339 345     Recent Labs     01/31/23 2055 02/01/23 0600 02/02/23  0350   WBC  --  7.0 7.6   NEUTSPOLYS 55.4  --   --    LYMPHOCYTES 30.3  --   --    MONOCYTES 7.1  --   --    EOSINOPHILS 6.8*  --   --    BASOPHILS 0.4  --   --    ASTSGOT 42* 28  --    ALTSGPT 30 28  --    ALKPHOSPHAT 95 96  --    TBILIRUBIN 0.4 0.6  --        Meds:   dexmedetomidine (Precedex) infusion  0.1-1.5 mcg/kg/hr (Ideal) 0.2 mcg/kg/hr (02/02/23 0630)    sodium chloride  1 g      amLODIPine  5 mg      niCARdipine 25 mg/250 mL (0.1 mg/mL) standard infusion  0-15 mg/hr Stopped (02/02/23 0930)    enoxaparin (LOVENOX) injection  40 mg      PEDS potassium chloride (KCL-PERIPHERAL) IV  10 mEq Stopped (02/02/23 3368)    thiamine  200 mg 200 mg (02/02/23 8186)    Followed by    [START ON 2/3/2023] thiamine  100 mg      enalaprilat  1.25 mg      hydrALAZINE  10 mg      labetalol  10 mg      atorvastatin  10 mg      senna-docusate  2 Tablet      And    polyethylene glycol/lytes   1 Packet      And    magnesium hydroxide  30 mL      And    bisacodyl  10 mg      LORazepam  2 mg      acetaminophen  650 mg      Or    acetaminophen  650 mg      ondansetron  4 mg      Or    ondansetron  4 mg      MD Alert...Adult ICU Electrolyte Replacement per Pharmacy            Imaging:  EC-ECHOCARDIOGRAM COMPLETE W/O CONT   Final Result      MR-BRAIN-WITH & W/O   Final Result         Acute-subacute hemorrhage in the left thalamus and adjacent parietal corona radiata with mass effect upon the posterior third ventricle and slight midline shift measuring 4 to 5 mm. Surrounding edema noted which extends to the cerebral peduncle on the    tectum.      No abnormal vascularity or abnormal enhancement is identified in the vicinity of the hemorrhage to suggest an underlying lesion.      Nonspecific T2 hyperintensities are noted in the periventricular and deep white matter, most likely related to chronic microvascular ischemia.      MR-MRA HEAD-W/O   Final Result      Normal intracranial MRA.          ASSESSEMENT and PLAN:    * Nontraumatic acute hemorrhage of basal ganglia (HCC)- (present on admission)  Assessment & Plan  Secondary to uncontrolled hypertension  Neurology consulted  Strict blood pressure management with goal SBP less than 140  Avoid anticoagulants, SCDs only  Every 2 hour neurochecks  Seizure and aspiration precautions  PT/OT/SLP evaluation  Goal eunatremia, euthermia, euvolemia, euglycemia  Na goal 135-145, Na 130 this AM, started salt tabs  Started lovenox SQ for DVT ppx today    Hypokalemia  Assessment & Plan  Replete and monitor closely  K 3.7 today    Dyslipidemia  Assessment & Plan  Continue with low-dose atorvastatin  RD consulted for dietary changes; appreciate recommendations    Alcohol use  Assessment & Plan  Continue vitamin supplementation  Monitor for alcohol withdrawal syndrome  Eventual alcohol cessation education and resources to be provided when appropriate  Precedex gtt  If further  escalating withdrawal symptoms, consider gabapentin 800mg TID in addition to dex    Hypertensive emergency- (present on admission)  Assessment & Plan  Resume telmisartan once enteral access obtained  Titrate nicardipine drip for goal SBP less than 140  IV as needed Vasotec, hydralazine, labetalol      Sarkis Camara M.D.

## 2023-02-02 NOTE — THERAPY
Occupational Therapy   Initial Evaluation     Patient Name: Anali Pan  Age:  57 y.o., Sex:  female  Medical Record #: [id number]  Today's Date: 2/2/2023     Precautions  Precautions: Fall Risk, Swallow Precautions ( See Comments)    Assessment  Patient is 57 y.o. female with a diagnosis of L thalamic hemorrhage likely due to HTN per chart.  Additional factors influencing patient status / progress: weakness, fatigue, impaired balance, abnormal muscle tone, impaired cognition. Full assessment limited due to pt's anxious behavior and poor attention; see below.      Plan    Occupational Therapy Initial Treatment Plan   Treatment Interventions: Self Care / Activities of Daily Living, Adaptive Equipment, Neuro Re-Education / Balance, Therapeutic Exercises, Therapeutic Activity, Cognitive Skill Development, Sensory Integration Techniques  Treatment Frequency: 4 Times per Week  Duration: Until Therapy Goals Met    DC Equipment Recommendations: Unable to determine at this time  Discharge Recommendations: Recommend post-acute placement for additional occupational therapy services prior to discharge home        Objective       02/02/23 1419   Prior Living Situation   Prior Services Home-Independent   Housing / Facility 1 Story House   Steps Into Home 1   Bathroom Set up Walk In Shower   Equipment Owned Hand Held Shower;Front-Wheel Walker;Single Point Cane;Bed Side Commode   Lives with - Patient's Self Care Capacity Alone and Able to Care For Self   Comments Pt's SO lives down the block, reports he could stay with her. He does work though - sometimes from home, sometimes not from home. Has support from family and friends.   Prior Level of ADL Function   Self Feeding Independent   Grooming / Hygiene Independent   Bathing Independent   Dressing Independent   Toileting Independent   Prior Level of IADL Function   Medication Management Independent   Laundry Independent   Kitchen Mobility Independent   Finances Independent    Home Management Independent   Shopping Independent   Prior Level Of Mobility Independent Without Device in Community;Independent Without Device in Home   Driving / Transportation Driving Independent   Occupation (Pre-Hospital Vocational)   (works 1 day a week as a )   Leisure Interests Pets   Precautions   Precautions Fall Risk;Swallow Precautions ( See Comments)   Pain 0 - 10 Group   Therapist Pain Assessment Nurse Notified;0   Cognition    Cognition / Consciousness X   Speech/ Communication Delayed Responses;Word Finding Impairment   Level of Consciousness Alert   Ability To Follow Commands 1 Step  (<50%)   Safety Awareness Impaired   New Learning Impaired   Attention Impaired   Sequencing Impaired   Initiation Impaired   Comments pleasant and cooperative, once EOB pt with anxious behavior unable to focus. Per RN pt is entering alcohol withdrawal   Active ROM Upper Body   Active ROM Upper Body  X   Dominant Hand Right   Comments Difficult to formally assess due to impaired ability to focus and anxious behavior, however RUE flexor synergy pattern noted while EOB, LUE appeared to WFL   Upper Body Muscle Tone   Upper Body Muscle Tone  X   Rt Upper Extremity Muscle Tone Hypertonic;Fluctuating   Neurological Concerns   Neurological Concerns Yes   Sitting Posture During ADL's Pushes to the Right   Balance Assessment   Sitting Balance (Static) Trace +   Sitting Balance (Dynamic) Trace   Weight Shift Sitting Poor   Bed Mobility    Supine to Sit Total Assist   Sit to Supine Total Assist   Scooting Maximal Assist   ADL Assessment   Comments Unable to assess seated ADLs due to pt's anxious behavior and poor attention   How much help from another person does the patient currently need...   Putting on and taking off regular lower body clothing? 2   Bathing (including washing, rinsing, and drying)? 2   Toileting, which includes using a toilet, bedpan, or urinal? 2   Putting on and taking off regular upper body  clothing? 3   Taking care of personal grooming such as brushing teeth? 3   Eating meals? 3   6 Clicks Daily Activity Score 15   mRS Prior to admission   Prior to admission mRS 0   Modified Graham (mRS)   Modified Hill Score 5   Functional Mobility   Sit to Stand Unable to Participate   Bed, Chair, Wheelchair Transfer Unable to Participate   Mobility EOB only   Patient / Family Goals   Patient / Family Goal #1 to go home   Short Term Goals   Short Term Goal # 1 pt will groom seated EOB w/ setup   Short Term Goal # 2 pt will demo ADL txfs with [place]   Short Term Goal # 3 pt will demo toileting with supv

## 2023-02-03 PROBLEM — Q21.12 PFO (PATENT FORAMEN OVALE): Status: ACTIVE | Noted: 2023-02-03

## 2023-02-03 LAB
ANION GAP SERPL CALC-SCNC: 10 MMOL/L (ref 7–16)
BUN SERPL-MCNC: 18 MG/DL (ref 8–22)
CALCIUM SERPL-MCNC: 8.9 MG/DL (ref 8.5–10.5)
CHLORIDE SERPL-SCNC: 100 MMOL/L (ref 96–112)
CO2 SERPL-SCNC: 21 MMOL/L (ref 20–33)
CREAT SERPL-MCNC: 0.69 MG/DL (ref 0.5–1.4)
ERYTHROCYTE [DISTWIDTH] IN BLOOD BY AUTOMATED COUNT: 44.2 FL (ref 35.9–50)
GFR SERPLBLD CREATININE-BSD FMLA CKD-EPI: 101 ML/MIN/1.73 M 2
GLUCOSE SERPL-MCNC: 116 MG/DL (ref 65–99)
HCT VFR BLD AUTO: 37.7 % (ref 37–47)
HGB BLD-MCNC: 12.5 G/DL (ref 12–16)
MAGNESIUM SERPL-MCNC: 2.2 MG/DL (ref 1.5–2.5)
MCH RBC QN AUTO: 31.9 PG (ref 27–33)
MCHC RBC AUTO-ENTMCNC: 33.2 G/DL (ref 33.6–35)
MCV RBC AUTO: 96.2 FL (ref 81.4–97.8)
PLATELET # BLD AUTO: 313 K/UL (ref 164–446)
PMV BLD AUTO: 9.5 FL (ref 9–12.9)
POTASSIUM SERPL-SCNC: 4.3 MMOL/L (ref 3.6–5.5)
RBC # BLD AUTO: 3.92 M/UL (ref 4.2–5.4)
SODIUM SERPL-SCNC: 131 MMOL/L (ref 135–145)
WBC # BLD AUTO: 6 K/UL (ref 4.8–10.8)

## 2023-02-03 PROCEDURE — 85027 COMPLETE CBC AUTOMATED: CPT

## 2023-02-03 PROCEDURE — 700105 HCHG RX REV CODE 258: Performed by: INTERNAL MEDICINE

## 2023-02-03 PROCEDURE — 80048 BASIC METABOLIC PNL TOTAL CA: CPT

## 2023-02-03 PROCEDURE — 99233 SBSQ HOSP IP/OBS HIGH 50: CPT | Performed by: INTERNAL MEDICINE

## 2023-02-03 PROCEDURE — A9270 NON-COVERED ITEM OR SERVICE: HCPCS | Performed by: STUDENT IN AN ORGANIZED HEALTH CARE EDUCATION/TRAINING PROGRAM

## 2023-02-03 PROCEDURE — 83735 ASSAY OF MAGNESIUM: CPT

## 2023-02-03 PROCEDURE — 700111 HCHG RX REV CODE 636 W/ 250 OVERRIDE (IP): Performed by: STUDENT IN AN ORGANIZED HEALTH CARE EDUCATION/TRAINING PROGRAM

## 2023-02-03 PROCEDURE — 700105 HCHG RX REV CODE 258: Performed by: NURSE PRACTITIONER

## 2023-02-03 PROCEDURE — 700102 HCHG RX REV CODE 250 W/ 637 OVERRIDE(OP): Performed by: STUDENT IN AN ORGANIZED HEALTH CARE EDUCATION/TRAINING PROGRAM

## 2023-02-03 PROCEDURE — A9270 NON-COVERED ITEM OR SERVICE: HCPCS | Performed by: INTERNAL MEDICINE

## 2023-02-03 PROCEDURE — 700102 HCHG RX REV CODE 250 W/ 637 OVERRIDE(OP): Performed by: INTERNAL MEDICINE

## 2023-02-03 PROCEDURE — 99232 SBSQ HOSP IP/OBS MODERATE 35: CPT | Performed by: PSYCHIATRY & NEUROLOGY

## 2023-02-03 PROCEDURE — 97530 THERAPEUTIC ACTIVITIES: CPT

## 2023-02-03 PROCEDURE — 700101 HCHG RX REV CODE 250: Performed by: NURSE PRACTITIONER

## 2023-02-03 PROCEDURE — 97112 NEUROMUSCULAR REEDUCATION: CPT

## 2023-02-03 PROCEDURE — 770001 HCHG ROOM/CARE - MED/SURG/GYN PRIV*

## 2023-02-03 PROCEDURE — 99223 1ST HOSP IP/OBS HIGH 75: CPT | Performed by: INTERNAL MEDICINE

## 2023-02-03 PROCEDURE — 99223 1ST HOSP IP/OBS HIGH 75: CPT | Performed by: PHYSICAL MEDICINE & REHABILITATION

## 2023-02-03 RX ORDER — LORAZEPAM 2 MG/1
2 TABLET ORAL
Status: DISCONTINUED | OUTPATIENT
Start: 2023-02-03 | End: 2023-02-06 | Stop reason: HOSPADM

## 2023-02-03 RX ORDER — SODIUM CHLORIDE 9 MG/ML
500 INJECTION, SOLUTION INTRAVENOUS ONCE
Status: COMPLETED | OUTPATIENT
Start: 2023-02-03 | End: 2023-02-03

## 2023-02-03 RX ORDER — LORAZEPAM 2 MG/1
4 TABLET ORAL
Status: DISCONTINUED | OUTPATIENT
Start: 2023-02-03 | End: 2023-02-06 | Stop reason: HOSPADM

## 2023-02-03 RX ORDER — LORAZEPAM 1 MG/1
0.5 TABLET ORAL EVERY 4 HOURS PRN
Status: DISCONTINUED | OUTPATIENT
Start: 2023-02-03 | End: 2023-02-06 | Stop reason: HOSPADM

## 2023-02-03 RX ORDER — LORAZEPAM 2 MG/ML
1.5 INJECTION INTRAMUSCULAR
Status: DISCONTINUED | OUTPATIENT
Start: 2023-02-03 | End: 2023-02-06 | Stop reason: HOSPADM

## 2023-02-03 RX ORDER — SODIUM CHLORIDE 1 G/1
1 TABLET ORAL ONCE
Status: COMPLETED | OUTPATIENT
Start: 2023-02-03 | End: 2023-02-03

## 2023-02-03 RX ORDER — CLONIDINE HYDROCHLORIDE 0.1 MG/1
0.1 TABLET ORAL
Status: DISCONTINUED | OUTPATIENT
Start: 2023-02-03 | End: 2023-02-03

## 2023-02-03 RX ORDER — LORAZEPAM 2 MG/ML
0.5 INJECTION INTRAMUSCULAR EVERY 4 HOURS PRN
Status: DISCONTINUED | OUTPATIENT
Start: 2023-02-03 | End: 2023-02-06 | Stop reason: HOSPADM

## 2023-02-03 RX ORDER — CHOLECALCIFEROL (VITAMIN D3) 125 MCG
5 CAPSULE ORAL NIGHTLY
Status: DISCONTINUED | OUTPATIENT
Start: 2023-02-03 | End: 2023-02-06 | Stop reason: HOSPADM

## 2023-02-03 RX ORDER — LORAZEPAM 2 MG/ML
2 INJECTION INTRAMUSCULAR
Status: DISCONTINUED | OUTPATIENT
Start: 2023-02-03 | End: 2023-02-06 | Stop reason: HOSPADM

## 2023-02-03 RX ORDER — CLONIDINE 0.1 MG/24H
1 PATCH, EXTENDED RELEASE TRANSDERMAL
Status: DISCONTINUED | OUTPATIENT
Start: 2023-02-03 | End: 2023-02-06 | Stop reason: HOSPADM

## 2023-02-03 RX ORDER — SODIUM CHLORIDE 1 G/1
2 TABLET ORAL
Status: DISCONTINUED | OUTPATIENT
Start: 2023-02-03 | End: 2023-02-06 | Stop reason: HOSPADM

## 2023-02-03 RX ORDER — LORAZEPAM 2 MG/ML
1 INJECTION INTRAMUSCULAR
Status: DISCONTINUED | OUTPATIENT
Start: 2023-02-03 | End: 2023-02-06 | Stop reason: HOSPADM

## 2023-02-03 RX ORDER — LORAZEPAM 1 MG/1
1 TABLET ORAL EVERY 4 HOURS PRN
Status: DISCONTINUED | OUTPATIENT
Start: 2023-02-03 | End: 2023-02-06 | Stop reason: HOSPADM

## 2023-02-03 RX ADMIN — SENNOSIDES AND DOCUSATE SODIUM 2 TABLET: 50; 8.6 TABLET ORAL at 06:32

## 2023-02-03 RX ADMIN — SODIUM CHLORIDE 1 G: 1 TABLET ORAL at 07:40

## 2023-02-03 RX ADMIN — DEXMEDETOMIDINE 0.3 MCG/KG/HR: 200 INJECTION, SOLUTION INTRAVENOUS at 02:39

## 2023-02-03 RX ADMIN — Medication 5 MG: at 20:44

## 2023-02-03 RX ADMIN — HYDRALAZINE HYDROCHLORIDE 10 MG: 20 INJECTION INTRAMUSCULAR; INTRAVENOUS at 20:50

## 2023-02-03 RX ADMIN — SODIUM CHLORIDE 2 G: 1 TABLET ORAL at 16:17

## 2023-02-03 RX ADMIN — ENALAPRILAT 1.25 MG: 1.25 INJECTION INTRAVENOUS at 22:18

## 2023-02-03 RX ADMIN — SODIUM CHLORIDE 2 G: 1 TABLET ORAL at 11:49

## 2023-02-03 RX ADMIN — THIAMINE HYDROCHLORIDE 100 MG: 100 INJECTION, SOLUTION INTRAMUSCULAR; INTRAVENOUS at 06:12

## 2023-02-03 RX ADMIN — HYDRALAZINE HYDROCHLORIDE 10 MG: 20 INJECTION INTRAMUSCULAR; INTRAVENOUS at 09:48

## 2023-02-03 RX ADMIN — SODIUM CHLORIDE 1 G: 1 TABLET ORAL at 06:32

## 2023-02-03 RX ADMIN — LABETALOL HYDROCHLORIDE 10 MG: 5 INJECTION, SOLUTION INTRAVENOUS at 08:05

## 2023-02-03 RX ADMIN — CLONIDINE 1 PATCH: 0.1 PATCH TRANSDERMAL at 13:44

## 2023-02-03 RX ADMIN — LORAZEPAM 1 MG: 1 TABLET ORAL at 23:41

## 2023-02-03 RX ADMIN — LABETALOL HYDROCHLORIDE 10 MG: 5 INJECTION, SOLUTION INTRAVENOUS at 19:46

## 2023-02-03 RX ADMIN — SODIUM CHLORIDE 500 ML: 9 INJECTION, SOLUTION INTRAVENOUS at 07:46

## 2023-02-03 RX ADMIN — ATORVASTATIN CALCIUM 10 MG: 10 TABLET, FILM COATED ORAL at 16:17

## 2023-02-03 RX ADMIN — ENOXAPARIN SODIUM 40 MG: 40 INJECTION SUBCUTANEOUS at 16:17

## 2023-02-03 RX ADMIN — LABETALOL HYDROCHLORIDE 10 MG: 5 INJECTION, SOLUTION INTRAVENOUS at 23:48

## 2023-02-03 RX ADMIN — AMLODIPINE BESYLATE 10 MG: 10 TABLET ORAL at 06:32

## 2023-02-03 RX ADMIN — ACETAMINOPHEN 650 MG: 325 TABLET, FILM COATED ORAL at 10:23

## 2023-02-03 RX ADMIN — ONDANSETRON 4 MG: 2 INJECTION INTRAMUSCULAR; INTRAVENOUS at 10:28

## 2023-02-03 ASSESSMENT — LIFESTYLE VARIABLES
ORIENTATION AND CLOUDING OF SENSORIUM: ORIENTED AND CAN DO SERIAL ADDITIONS
EVER FELT BAD OR GUILTY ABOUT YOUR DRINKING: YES
AGITATION: *
VISUAL DISTURBANCES: NOT PRESENT
AVERAGE NUMBER OF DAYS PER WEEK YOU HAVE A DRINK CONTAINING ALCOHOL: 6
ANXIETY: MODERATELY ANXIOUS OR GUARDED, SO ANXIETY IS INFERRED
PAROXYSMAL SWEATS: NO SWEAT VISIBLE
DOES PATIENT WANT TO STOP DRINKING: YES
AGITATION: NORMAL ACTIVITY
TOTAL SCORE: 2
HEADACHE, FULLNESS IN HEAD: VERY MILD
CONSUMPTION TOTAL: INCOMPLETE
DO YOU DRINK ALCOHOL: YES
AUDITORY DISTURBANCES: NOT PRESENT
ANXIETY: MILDLY ANXIOUS
AUDITORY DISTURBANCES: NOT PRESENT
VISUAL DISTURBANCES: NOT PRESENT
TOTAL SCORE: 2
PAROXYSMAL SWEATS: NO SWEAT VISIBLE
NAUSEA AND VOMITING: NO NAUSEA AND NO VOMITING
AUDITORY DISTURBANCES: NOT PRESENT
HEADACHE, FULLNESS IN HEAD: NOT PRESENT
TREMOR: TREMOR NOT VISIBLE BUT CAN BE FELT, FINGERTIP TO FINGERTIP
HEADACHE, FULLNESS IN HEAD: NOT PRESENT
TOTAL SCORE: 2
NAUSEA AND VOMITING: NO NAUSEA AND NO VOMITING
TOTAL SCORE: 8
ANXIETY: *
HAVE YOU EVER FELT YOU SHOULD CUT DOWN ON YOUR DRINKING: YES
TREMOR: TREMOR NOT VISIBLE BUT CAN BE FELT, FINGERTIP TO FINGERTIP
PAROXYSMAL SWEATS: BARELY PERCEPTIBLE SWEATING. PALMS MOIST
NAUSEA AND VOMITING: NO NAUSEA AND NO VOMITING
HAVE PEOPLE ANNOYED YOU BY CRITICIZING YOUR DRINKING: NO
EVER HAD A DRINK FIRST THING IN THE MORNING TO STEADY YOUR NERVES TO GET RID OF A HANGOVER: NO
ORIENTATION AND CLOUDING OF SENSORIUM: ORIENTED AND CAN DO SERIAL ADDITIONS
TREMOR: *
AGITATION: SOMEWHAT MORE THAN NORMAL ACTIVITY
TOTAL SCORE: 2
TOTAL SCORE: 8
VISUAL DISTURBANCES: NOT PRESENT
ORIENTATION AND CLOUDING OF SENSORIUM: ORIENTED AND CAN DO SERIAL ADDITIONS
ON A TYPICAL DAY WHEN YOU DRINK ALCOHOL HOW MANY DRINKS DO YOU HAVE: 5
DOES PATIENT WANT TO TALK TO SOMEONE ABOUT QUITTING: YES

## 2023-02-03 ASSESSMENT — ENCOUNTER SYMPTOMS
FOCAL WEAKNESS: 1
CLAUDICATION: 0
WEAKNESS: 1
NAUSEA: 1
ABDOMINAL PAIN: 0
SHORTNESS OF BREATH: 0
DEPRESSION: 0
PHOTOPHOBIA: 0
SPEECH CHANGE: 0
NERVOUS/ANXIOUS: 0
DIZZINESS: 0
WEIGHT LOSS: 0
HEMOPTYSIS: 0
VOMITING: 0
DOUBLE VISION: 0
FEVER: 0
SORE THROAT: 0
DIARRHEA: 0
BACK PAIN: 0
BRUISES/BLEEDS EASILY: 0
MYALGIAS: 0
SENSORY CHANGE: 0
NECK PAIN: 0
SPUTUM PRODUCTION: 0
HEADACHES: 1
BLURRED VISION: 0
NAUSEA: 0
CHILLS: 0
PALPITATIONS: 0
TINGLING: 0
SENSORY CHANGE: 1
CONSTIPATION: 0
COUGH: 0
ORTHOPNEA: 0

## 2023-02-03 ASSESSMENT — COGNITIVE AND FUNCTIONAL STATUS - GENERAL
WALKING IN HOSPITAL ROOM: A LOT
MOVING TO AND FROM BED TO CHAIR: UNABLE
TURNING FROM BACK TO SIDE WHILE IN FLAT BAD: UNABLE
SUGGESTED CMS G CODE MODIFIER MOBILITY: CM
MOBILITY SCORE: 8
STANDING UP FROM CHAIR USING ARMS: A LOT
CLIMB 3 TO 5 STEPS WITH RAILING: TOTAL
MOVING FROM LYING ON BACK TO SITTING ON SIDE OF FLAT BED: UNABLE

## 2023-02-03 ASSESSMENT — PAIN DESCRIPTION - PAIN TYPE
TYPE: ACUTE PAIN

## 2023-02-03 ASSESSMENT — GAIT ASSESSMENTS: GAIT LEVEL OF ASSIST: UNABLE TO PARTICIPATE

## 2023-02-03 NOTE — PROGRESS NOTES
Neurology Progress Note  Neurohospitalist Service, [facility] Neurosciences    Referring Physician: Jeremy M Gonda, M.D.      Interval History: No acute events overnight.  Stable exam. Blood pressures well-controlled.  Off dex this morning.    Review of systems: In addition to what is detailed in the HPI and/or updated in the interval history, all other systems reviewed and are negative.    Past Medical History, Past Surgical History and Social History reviewed and unchanged from prior    Medications:    Current Facility-Administered Medications:     sodium chloride (SALT) tablet 2 g, 2 g, Oral, TID WITH MEALS, Jeremy M Gonda, M.D.    dexmedetomidine (PRECEDEX) 400 mcg/100mL NS premix infusion, 0.1-1.5 mcg/kg/hr (Ideal), Intravenous, Continuous, Tracy Chin, Last Rate: 4.8 mL/hr at 02/03/23 0239, 0.3 mcg/kg/hr at 02/03/23 0239    niCARdipine (CARDENE) 25 mg in  mL Standard Infusion, 0-15 mg/hr, Intravenous, Continuous, Jeremy M Gonda, M.D., Dose not Required at 02/02/23 0930    enoxaparin (Lovenox) inj 40 mg, 40 mg, Subcutaneous, DAILY AT 1800, Sarkis Camara M.D., 40 mg at 02/02/23 1737    amLODIPine (NORVASC) tablet 10 mg, 10 mg, Oral, Q DAY, Jeremy M Gonda, M.D., 10 mg at 02/03/23 0632    [COMPLETED] thiamine (B-1) 200 mg in dextrose 5% 100 mL IVPB, 200 mg, Intravenous, BID, Last Rate: 200 mL/hr at 02/02/23 1741, 200 mg at 02/02/23 1741 **FOLLOWED BY** thiamine (B-1) IVPB 100 mg in 100 mL D5W (premix), 100 mg, Intravenous, DAILY, Domingo Aguayo M.D., Stopped at 02/03/23 0642    enalaprilat (Vasotec) injection 1.25 mg 1 mL, 1.25 mg, Intravenous, Q6HRS PRN, Domingo Aguayo M.D., 1.25 mg at 02/02/23 0855    hydrALAZINE (APRESOLINE) injection 10 mg, 10 mg, Intravenous, Q4HRS PRN, Domingo Aguayo M.D., 10 mg at 02/02/23 1538    labetalol (NORMODYNE/TRANDATE) injection 10 mg, 10 mg, Intravenous, Q4HRS PRN, Domingo Aguayo M.D., 10 mg at 02/02/23 0630    atorvastatin (LIPITOR) tablet 10 mg, 10 mg,  "Oral, Q EVENING, Sarkis Camara M.D., 10 mg at 02/02/23 1806    senna-docusate (PERICOLACE or SENOKOT S) 8.6-50 MG per tablet 2 Tablet, 2 Tablet, Oral, BID, 2 Tablet at 02/03/23 0632 **AND** polyethylene glycol/lytes (MIRALAX) PACKET 1 Packet, 1 Packet, Oral, QDAY PRN **AND** magnesium hydroxide (MILK OF MAGNESIA) suspension 30 mL, 30 mL, Oral, QDAY PRN **AND** bisacodyl (DULCOLAX) suppository 10 mg, 10 mg, Rectal, QDAY PRN, Domingo Aguayo M.D.    LORazepam (ATIVAN) injection 2 mg, 2 mg, Intravenous, Q5 MIN PRN, Domingo Aguayo M.D.    acetaminophen (Tylenol) tablet 650 mg, 650 mg, Oral, Q4HRS PRN, 650 mg at 02/01/23 1721 **OR** acetaminophen (TYLENOL) suppository 650 mg, 650 mg, Rectal, Q4HRS PRN, Domingo Aguayo M.D.    ondansetron (ZOFRAN ODT) dispertab 4 mg, 4 mg, Oral, Q4HRS PRN **OR** ondansetron (ZOFRAN) syringe/vial injection 4 mg, 4 mg, Intravenous, Q4HRS PRN, Domingo Aguayo M.D., 4 mg at 02/02/23 1630    MD Alert...ICU Electrolyte Replacement per Pharmacy, , Other, PHARMACY TO DOSE, Domingo Aguayo M.D.    Physical Examination:   BP (!) 133/90   Pulse 67   Temp 37.7 °C (99.9 °F) (Temporal)   Resp 19   Ht 1.727 m (5' 7.99\")   Wt 79.8 kg (175 lb 14.8 oz)   LMP  (LMP Unknown)   SpO2 97%   BMI 26.76 kg/m²       General: Patient is awake, alert, interactive  Neck: There is normal range of motion  CV: Regular rate   Extremities:  Warm, dry, and intact, without peripheral lower extremity edema    NEUROLOGICAL EXAM:     Mental status: Awake, interactive  Speech and language: Speech is mostly clear and fluent.  Occasional stuttering. The patient is able to name and repeat  Cranial nerve exam: Visual fields are full to threat. There is no nystagmus. Extraocular muscles are intact. Slight R face droop.   Motor exam: There is sustained antigravity with no downward drift in L arm and leg.  R arm antigravity, but with drift to bed.  R leg antigravity with drift.  Sensory exam:  Appears diminished in R " hemibody  Coordination: Severe ataxia with RUE, modest ataxia in RLE       Objective Data:    Labs:  No results found for: PROTHROMBTM, INR   Lab Results   Component Value Date/Time    WBC 6.0 02/03/2023 03:52 AM    RBC 3.92 (L) 02/03/2023 03:52 AM    HEMOGLOBIN 12.5 02/03/2023 03:52 AM    HEMATOCRIT 37.7 02/03/2023 03:52 AM    MCV 96.2 02/03/2023 03:52 AM    MCH 31.9 02/03/2023 03:52 AM    MCHC 33.2 (L) 02/03/2023 03:52 AM    MPV 9.5 02/03/2023 03:52 AM    NEUTSPOLYS 55.4 01/31/2023 08:55 PM    NEUTSPOLYS 53 01/30/2018 07:55 AM    LYMPHOCYTES 30.3 01/31/2023 08:55 PM    LYMPHOCYTES 31 01/30/2018 07:55 AM    MONOCYTES 7.1 01/31/2023 08:55 PM    MONOCYTES 9 01/30/2018 07:55 AM    EOSINOPHILS 6.8 (H) 01/31/2023 08:55 PM    EOSINOPHILS 5 01/30/2018 07:55 AM    BASOPHILS 0.4 01/31/2023 08:55 PM    BASOPHILS 2 01/30/2018 07:55 AM      Lab Results   Component Value Date/Time    SODIUM 131 (L) 02/03/2023 03:52 AM    POTASSIUM 4.3 02/03/2023 03:52 AM    CHLORIDE 100 02/03/2023 03:52 AM    CO2 21 02/03/2023 03:52 AM    GLUCOSE 116 (H) 02/03/2023 03:52 AM    BUN 18 02/03/2023 03:52 AM    CREATININE 0.69 02/03/2023 03:52 AM    BUNCREATRAT 22 01/30/2018 07:55 AM    GLOMRATE 85 01/31/2023 08:55 PM      Lab Results   Component Value Date/Time    CHOLSTRLTOT 220 (H) 02/01/2023 12:40 AM     (H) 02/01/2023 12:40 AM    HDL 68 02/01/2023 12:40 AM    TRIGLYCERIDE 67 02/01/2023 12:40 AM       Lab Results   Component Value Date/Time    ALKPHOSPHAT 96 02/01/2023 06:00 AM    ASTSGOT 28 02/01/2023 06:00 AM    ALTSGPT 28 02/01/2023 06:00 AM    TBILIRUBIN 0.6 02/01/2023 06:00 AM        Imaging/Testing:    I interpreted and/or reviewed the patient's neuroimaging    EC-ECHOCARDIOGRAM COMPLETE W/O CONT   Final Result      MR-BRAIN-WITH & W/O   Final Result         Acute-subacute hemorrhage in the left thalamus and adjacent parietal corona radiata with mass effect upon the posterior third ventricle and slight midline shift measuring 4 to 5  mm. Surrounding edema noted which extends to the cerebral peduncle on the    tectum.      No abnormal vascularity or abnormal enhancement is identified in the vicinity of the hemorrhage to suggest an underlying lesion.      Nonspecific T2 hyperintensities are noted in the periventricular and deep white matter, most likely related to chronic microvascular ischemia.      MR-MRA HEAD-W/O   Final Result      Normal intracranial MRA.          Assessment and Plan:  Anali Pan is a 57 year old woman with hypertension, presenting with R side weakness, found to have an acute L thalamic ICH extending to the L cerebral peduncle.  CT angiogram and contrast MRI brain without evidence of underlying tumor or vascular lesion.  Hemorrhage etiology most likely hypertension.  She having some mild withdrawal symptoms.  Blood pressures are now well controlled. Fortunately hemorrhage is stable on serial imaging, and overall burden is small- so does not warrant hyperosmolar therapy or surgical decompression.    Problem list:  L thalamic ICH  Hypertension  EtOH use    Plan:   - may transition to q4h neurochecks given clinical stability   - SBP goal 100-140 STRICT- continue titrate PO medications,IV anti-HTN PRN   - Na goal 135-145, Na 131 this AM, continue salt tabs   - maintain normothermia, net even I/O, FSBS    - continue lovenox SQ for DVT chemoppx   - PT/OT/SLP, physiatry consult   - please reconsult Vascular Neurology with any further questions or concerns    The evaluation of the patient, and recommended management, was discussed with the ICU staff. I have performed a physical exam and reviewed and updated ROS and Plan today (2/3/2023).     Erick Olson MD  Neurohospitalist, Acute Care Services

## 2023-02-03 NOTE — CONSULTS
Hospital Medicine Consultation    Date of Service  2/3/2023    Referring Physician  Jeremy M Gonda, M.D    Consulting Physician  Treasure More M.D.    Reason for Consultation  Hemorrhagic stroke and uncontrolled hypertension    History of Presenting Illness    57-year-old female with history of hypertension and alcoholism presented 1/31 with headache and right facial droop.  Initially patient went to [facility] emergency department and a NIH score was around 5, CT scan for head showed left basal ganglia and thalamic hemorrhagic stroke.  Patient was transferred to our facility for higher level of care, patient was admitted to ICU, nicardipine infusion to control blood pressure was initiated.  Patient was evaluated by intensivist and neurologist on admission, CTA for head did not show any significant stenosis in bilateral internal carotid.  MRI for head with and without contrast showed acute and subacute hemorrhage in the left thalamus and adjust partial corona radiata with mass-effect upon the posterior third ventricle and slight midline shift measured 4 to 5 mm.    Patient has history of alcoholism and drinking around 5-7 drinks daily, patient is on Precedex at the ICU and on the floor start with CIWA protocol, patient had some agitation and delirium and needed restraint.    Review of Systems  Review of Systems   Constitutional:  Negative for chills, fever and weight loss.   HENT:  Negative for ear pain, hearing loss and tinnitus.    Eyes:  Negative for blurred vision, double vision and photophobia.   Respiratory:  Negative for cough and hemoptysis.    Cardiovascular:  Negative for chest pain, palpitations, orthopnea and claudication.   Gastrointestinal:  Negative for abdominal pain, constipation, diarrhea, nausea and vomiting.   Genitourinary:  Negative for dysuria, frequency and urgency.   Musculoskeletal:  Negative for myalgias and neck pain.   Skin:  Negative for rash.   Neurological:  Positive for  sensory change, focal weakness and weakness. Negative for dizziness, tingling and speech change.     Past Medical History   has a past medical history of Hypertension.    Surgical History   has a past surgical history that includes ovarian cystectomy.    Family History  family history includes Breast Cancer in her mother; Cancer in her mother; Diabetes in her father; Hypertension in her brother, father, and mother; Thyroid in her mother.    Social History   reports that she has never smoked. She has never used smokeless tobacco. She reports current alcohol use of about 21.0 oz per week. She reports that she does not use drugs.    Medications  Prior to Admission Medications   Prescriptions Last Dose Informant Patient Reported? Taking?   Fish Oil Oil unk  Yes No   Sig: by Does not apply route.      Facility-Administered Medications: None       Allergies  No Known Allergies    Physical Exam  Temp:  [36.6 °C (97.9 °F)] 36.6 °C (97.9 °F)  Pulse:  [57-85] 79  Resp:  [11-33] 20  BP: (112-159)/() 115/88  SpO2:  [93 %-99 %] 96 %    Physical Exam  Constitutional:       Appearance: She is not ill-appearing.   HENT:      Head: Normocephalic and atraumatic.   Eyes:      General: No scleral icterus.  Cardiovascular:      Rate and Rhythm: Normal rate.      Heart sounds: No murmur heard.  Pulmonary:      Effort: No respiratory distress.      Breath sounds: No wheezing.   Abdominal:      General: There is no distension.      Palpations: Abdomen is soft.      Tenderness: There is no abdominal tenderness. There is no guarding.   Musculoskeletal:         General: No deformity.      Right lower leg: No edema.      Left lower leg: No edema.   Skin:     Coloration: Skin is not jaundiced.      Findings: No bruising.   Neurological:      Mental Status: She is disoriented.      Cranial Nerves: No cranial nerve deficit.      Sensory: Sensory deficit present.      Motor: Weakness present.      Coordination: Coordination abnormal.       Gait: Gait abnormal.      Comments: Significant abnormal coordination on the right side  Sensory deficit on the right side  Some dysarthria  Alert to herself and place with some confusing.       Fluids  Date 02/03/23 0700 - 02/04/23 0659   Shift 8980-7031 5104-7222 8322-2774 24 Hour Total   INTAKE   P.O. 120   120   I.V. 508.4   508.4   IV Piggyback 61.6   61.6   Shift Total 690   690   OUTPUT   Urine 150   150   Shift Total 150   150   Weight (kg) 79.8 79.8 79.8 79.8       Laboratory  Recent Labs     02/01/23  0600 02/02/23  0350 02/03/23  0352   WBC 7.0 7.6 6.0   RBC 4.26 4.14* 3.92*   HEMOGLOBIN 13.7 13.1 12.5   HEMATOCRIT 39.9 38.9 37.7   MCV 93.7 94.0 96.2   MCH 32.2 31.6 31.9   MCHC 34.3 33.7 33.2*   RDW 41.9 43.8 44.2   PLATELETCT 339 345 313   MPV 9.6 9.4 9.5     Recent Labs     02/01/23  0600 02/02/23  0600 02/03/23  0352   SODIUM 137 130* 131*   POTASSIUM 3.3* 3.7 4.3   CHLORIDE 102 97 100   CO2 23 21 21   GLUCOSE 125* 123* 116*   BUN 13 7* 18   CREATININE 0.55 0.40* 0.69   CALCIUM 9.0 9.2 8.9              Recent Labs     02/01/23  0040   TRIGLYCERIDE 67   HDL 68   *        Imaging  EC-ECHOCARDIOGRAM COMPLETE W/O CONT   Final Result      MR-BRAIN-WITH & W/O   Final Result         Acute-subacute hemorrhage in the left thalamus and adjacent parietal corona radiata with mass effect upon the posterior third ventricle and slight midline shift measuring 4 to 5 mm. Surrounding edema noted which extends to the cerebral peduncle on the    tectum.      No abnormal vascularity or abnormal enhancement is identified in the vicinity of the hemorrhage to suggest an underlying lesion.      Nonspecific T2 hyperintensities are noted in the periventricular and deep white matter, most likely related to chronic microvascular ischemia.      MR-MRA HEAD-W/O   Final Result      Normal intracranial MRA.          Assessment/Plan  * Nontraumatic acute hemorrhage of basal ganglia (HCC)- (present on admission)  Assessment &  Plan  Likely related to uncontrolled hypertension  MRI showed subacute hemorrhagic left thalamus  CT head did not show obstruction on the carotid artery  Control blood pressure, target less than 140  Echo showed PFO versus ASD, discussed with neurology, not related to the stroke.  Patient is on amlodipine and start with clonidine as needed  PT and OT and physiatry consult, likely transfer to rehab next week.  Okay by neuro to start DVT prophylaxis  Neurology on board, appreciate recommendations      Alcohol use  Assessment & Plan  Drinking around 5 drinks every day  Patient was on Precedex in the ICU  Patient had delirium and agitation and needed restraint  Continue multivitamin and thiamine  Start with CIWA at the floor  Continue nonpharmacological treatment for delirium    Dyslipidemia  Assessment & Plan  Continue atorvastatin    Hyponatremia- (present on admission)  Assessment & Plan  Monitoring with labs daily, fluid restriction  Patient is on salt tablets by intensivist  The goal 135-145    PFO (patent foramen ovale)  Assessment & Plan  Echo showed PFO versus ASD, right-to-left shunt with no signs of pulmonary hypertension.  Case was discussed with neurology team, patient has hemorrhagic stroke related to uncontrolled hypertension, and PFO was not related to the stroke.  To follow-up with cardiology as outpatient, no indication at this time for aspirin or closing

## 2023-02-03 NOTE — ASSESSMENT & PLAN NOTE
Echo showed PFO versus ASD, right-to-left shunt with no signs of pulmonary hypertension.  Case was discussed with neurology team, patient has hemorrhagic stroke related to uncontrolled hypertension, and PFO was not related to the stroke.  To follow-up with cardiology as outpatient, no indication at this time for aspirin or closing

## 2023-02-03 NOTE — ASSESSMENT & PLAN NOTE
Drinking around 5 drinks every day  Patient was on Precedex in the ICU  Patient had delirium and agitation and needed restraint  Continue multivitamin and thiamine  Start with CIWA at the floor  Continue nonpharmacological treatment for delirium  Alcohol cessation counseling

## 2023-02-03 NOTE — CONSULTS
Physical Medicine and Rehabilitation Consultation              Date of initial consultation: 2/3/2023  Requesting provider: Domingo Aguayo M.D. at 02/03/23 0719   Consulting provider: Tania Marcum D.O.  Reason for consultation: assess for acute inpatient rehab appropriateness  LOS: 3 Day(s)    Chief complaint: Right-sided weakness    HPI: The patient is a 57 y.o.  female with a past medical history of hypertension noncompliant on antihypertensive medications and alcohol use of approximately 5-6 drinks per day;  who presented on 1/31/2023 11:17 PM as a transfer from [facility] with right-sided weakness.  Per documentation, patient had acute onset right-sided weakness and difficulty speaking, she arrived at the outside hospital with SBP up to 200.  A CT head was obtained which showed an acute left thalamic hemorrhage patient was transferred to [facility] for higher level of care.  Patient was admitted to the ICU with strict SBP goal of 100-1 40.  She was placed on a nicardipine drip for blood pressure control.  An MRI brain was obtained with evidence of an acute/subacute hemorrhage in the left thalamus adjacent to the parietal corona radiata with mass effect upon the posterior third ventricle with slight midline shift measuring 4 to 5 mm there is no evidence of abnormality relating to underlying lesion or abnormal vascularity.  Patient's hospital course has been complicated by onset of alcohol withdrawal, patient had poor participation with therapy due to anxiety and unwillingness to complete full commands for therapy session.  Patient is on a Precedex drip for agitation.  Echocardiogram was obtained with an EF of 70% and no evidence of PFO.    Patient seen and examined at bedside with daughters, patient more cooperative today than yesterday. Minimal use of soft restraints today. Patient reports R sided weakness with some sensory changes in R hand. Reports improved strength in RUE compared to  yesterday. Patient denies changes in vision. Denies HA, lightheadedness, or dizziness today. Reports willingness to work with therapists today now that her alcohol withdrawal had improved today.     Social Hx:  Patient lives alone however has a boyfriend that lives down the street in a one-story house with one-step to enter.  Boyfriend works full-time patient's mother is also local and may be able to help intermittently.  1 DIANNA  At prior level of function patient was Independent with mobility and ADLs.         Employment: Works part-time as a   Tobacco: Denies  Alcohol: Daily alcohol use of approximately 5-6 drinks per day  Drugs: Denies    THERAPY:  Restrictions: Fall risk, swallow precautions  PT: Functional mobility   2/2 total assist bed mobility, unable to participate in functional sit to stand, unable to participate in gait    OT: ADLs  2/2 therapy limited by anxiety due to alcohol withdrawal total assist bed mobility    SLP:   2/2 upgraded to minced and moist with thin liquids    IMAGING:  MR-BRAIN-WITH & W/O   Final Result           Acute-subacute hemorrhage in the left thalamus and adjacent parietal corona radiata with mass effect upon the posterior third ventricle and slight midline shift measuring 4 to 5 mm. Surrounding edema noted which extends to the cerebral peduncle on the    tectum.       No abnormal vascularity or abnormal enhancement is identified in the vicinity of the hemorrhage to suggest an underlying lesion.       Nonspecific T2 hyperintensities are noted in the periventricular and deep white matter, most likely related to chronic microvascular ischemia.       PROCEDURES:  None    PMH:  Past Medical History:   Diagnosis Date    Hypertension        PSH:  Past Surgical History:   Procedure Laterality Date    OVARIAN CYSTECTOMY         FHX:  Family History   Problem Relation Age of Onset    Breast Cancer Mother     Hypertension Mother     Thyroid Mother         Hyperthyoidism    Cancer  "Mother         Breast    Hypertension Father     Diabetes Father         Type 2    Hypertension Brother         Gout       Medications:  Current Facility-Administered Medications   Medication Dose    sodium chloride (SALT) tablet 2 g  2 g    dexmedetomidine (PRECEDEX) 400 mcg/100mL NS premix infusion  0.1-1.5 mcg/kg/hr (Ideal)    enoxaparin (Lovenox) inj 40 mg  40 mg    amLODIPine (NORVASC) tablet 10 mg  10 mg    thiamine (B-1) IVPB 100 mg in 100 mL D5W (premix)  100 mg    enalaprilat (Vasotec) injection 1.25 mg 1 mL  1.25 mg    hydrALAZINE (APRESOLINE) injection 10 mg  10 mg    labetalol (NORMODYNE/TRANDATE) injection 10 mg  10 mg    atorvastatin (LIPITOR) tablet 10 mg  10 mg    senna-docusate (PERICOLACE or SENOKOT S) 8.6-50 MG per tablet 2 Tablet  2 Tablet    And    polyethylene glycol/lytes (MIRALAX) PACKET 1 Packet  1 Packet    And    magnesium hydroxide (MILK OF MAGNESIA) suspension 30 mL  30 mL    And    bisacodyl (DULCOLAX) suppository 10 mg  10 mg    LORazepam (ATIVAN) injection 2 mg  2 mg    acetaminophen (Tylenol) tablet 650 mg  650 mg    Or    acetaminophen (TYLENOL) suppository 650 mg  650 mg    ondansetron (ZOFRAN ODT) dispertab 4 mg  4 mg    Or    ondansetron (ZOFRAN) syringe/vial injection 4 mg  4 mg    MD Alert...ICU Electrolyte Replacement per Pharmacy         Allergies:  No Known Allergies    Physical Exam:    Physical Exam:  Vitals: BP (!) 146/96   Pulse 67   Temp 37.7 °C (99.9 °F) (Temporal)   Resp 19   Ht 1.727 m (5' 7.99\")   Wt 79.8 kg (175 lb 14.8 oz)   SpO2 97%   Gen: NAD, laying comfortably in bed, daughters in room   Head:  NC/AT  Eyes/ Nose/ Mouth: PERRLA, moist mucous membranes  Cardio: RRR, good distal perfusion, warm extremities  Pulm: normal respiratory effort, no cyanosis, breathing comfortably on 2 L   Abd: Soft NTND, negative borborygmi   Ext: No peripheral edema. No calf tenderness. No clubbing.    Mental status:  A&Ox4 (person, place, date, situation) answers questions " appropriately follows commands  Speech: fluent, no aphasia or dysarthria    CRANIAL NERVES:  2,3: visual acuity grossly intact, PERRL  3,4,6: EOMI bilaterally, no nystagmus or diplopia  5: sensation intact to light touch bilaterally and symmetric  7: + right sided facial droop   8: hearing grossly intact  12: tongue  deviates to right       Motor:      Upper Extremity  Myotome R L   Shoulder flexion C5 2/5 5/5   Elbow flexion C5 3/5 5/5   Wrist extension C6 3/5 5/5   Elbow extension C7 3/5 5/5   Finger flexion C8 4/5 5/5   Finger abduction T1 3/5 5/5     Lower Extremity Myotome R L   Hip flexion L2 4/5 5/5   Knee extension L3 4/5 5/5   Ankle dorsiflexion L4 4/5 5/5   Toe extension L5 4/5 5/5   Ankle plantarflexion S1 4/5 5/5       Negative Pronator drift bilaterally    Sensory:   intact to light touch through out left sided, decreased sensation to RUE       DTRs: 2+ in bilateral  biceps  No clonus at bilateral ankles  Negative [place] b/l     Tone: no spasticity noted, no cogwheeling noted    Coordination:   intact finger to nose bilaterally  intact fine motor with fingers bilaterally    Labs: Reviewed and significant for   Recent Labs     02/01/23  0600 02/02/23  0350 02/03/23  0352   RBC 4.26 4.14* 3.92*   HEMOGLOBIN 13.7 13.1 12.5   HEMATOCRIT 39.9 38.9 37.7   PLATELETCT 339 345 313     Recent Labs     02/01/23  0600 02/02/23  0600 02/03/23  0352   SODIUM 137 130* 131*   POTASSIUM 3.3* 3.7 4.3   CHLORIDE 102 97 100   CO2 23 21 21   GLUCOSE 125* 123* 116*   BUN 13 7* 18   CREATININE 0.55 0.40* 0.69   CALCIUM 9.0 9.2 8.9     Recent Results (from the past 24 hour(s))   EC-ECHOCARDIOGRAM COMPLETE W/O CONT    Collection Time: 02/02/23 10:05 AM   Result Value Ref Range    Eject.Frac. MOD BP 70.18     Eject.Frac. MOD 4C 73.57     Eject.Frac. MOD 2C 68.37    CBC without Differential    Collection Time: 02/03/23  3:52 AM   Result Value Ref Range    WBC 6.0 4.8 - 10.8 K/uL    RBC 3.92 (L) 4.20 - 5.40 M/uL    Hemoglobin  12.5 12.0 - 16.0 g/dL    Hematocrit 37.7 37.0 - 47.0 %    MCV 96.2 81.4 - 97.8 fL    MCH 31.9 27.0 - 33.0 pg    MCHC 33.2 (L) 33.6 - 35.0 g/dL    RDW 44.2 35.9 - 50.0 fL    Platelet Count 313 164 - 446 K/uL    MPV 9.5 9.0 - 12.9 fL   Magnesium    Collection Time: 02/03/23  3:52 AM   Result Value Ref Range    Magnesium 2.2 1.5 - 2.5 mg/dL   Basic Metabolic Panel    Collection Time: 02/03/23  3:52 AM   Result Value Ref Range    Sodium 131 (L) 135 - 145 mmol/L    Potassium 4.3 3.6 - 5.5 mmol/L    Chloride 100 96 - 112 mmol/L    Co2 21 20 - 33 mmol/L    Glucose 116 (H) 65 - 99 mg/dL    Bun 18 8 - 22 mg/dL    Creatinine 0.69 0.50 - 1.40 mg/dL    Calcium 8.9 8.5 - 10.5 mg/dL    Anion Gap 10.0 7.0 - 16.0   ESTIMATED GFR    Collection Time: 02/03/23  3:52 AM   Result Value Ref Range    GFR (CKD-EPI) 101 >60 mL/min/1.73 m 2         ASSESSMENT:  Patient is a 57 y.o. female admitted with right sided weakness     [facility] Code / Diagnosis to Support: 0001.2 - Stroke: Right Body Involvement (Left Brain)    Rehabilitation: Impaired ADLs and mobility  Patient is a good candidate for inpatient rehab based on needs for PT, OT, and speech therapy    Barriers to transfer include: Insurance authorization, TCCs to verify disposition, medical clearance and bed availability     Additional Recommendations:  Left thalamic ICH  - presented with R sided weakness and expressive aphasia  - images with evidence of  left thalamic hemorrhage   - neuro consulted, nicaridipine drip   - continue with PT/OT  - was unable to participate with therapies due to EtOH w/d notified therapy for need for updated eval to determine functional status prior to submitting to insurance for auth to IRF     Hypertension  - nicardipine drip, SBP goal < 140   - on amlodpine and     Alcohol abuse  Alcohol withdrawal  - 5-6 drinks per day  - CIWA protocol, precedex drip   - improved today, no longer needing soft restraints     Dysphagia  - seen by SLP   - upgraded to minced  and moist with thin liquids     Hyponatremia  - Na 131   - secondary to alcohol wd  - primary team monitoring electrolytes     Dispo:  - patient is currently functioning below their level of baseline, recommend post acute rehab  - recommend IRF level therapy with 3hr of therapy 5 days per week  - piror to acceptance to IRF, will need insurance auth from [employer]   - [facility] to assist with insurance auth and DC support       Medical Complexity:  Left thalamic ICH  Hypertension  Alcohol abuse  Alcohol withdrawal  Dysphagia  Hyponatremia  Impaired mobility and ADLs        DVT PPX: Lovenox      Thank you for allowing us to participate in the care of this patient.     Patient was seen for 88 minutes on unit/floor of which > 50% of time was spent on counseling and coordination of care regarding the above, including prognosis, risk reduction, benefits of treatment, and options for next stage of care.    Tania Marcum D.O.   Physical Medicine and Rehabilitation     Please note that this dictation was created using voice recognition software. I have made every reasonable attempt to correct obvious errors, but there may be errors of grammar and possibly content that I did not discover before finalizing the note.

## 2023-02-03 NOTE — PREADMISSION SCREENING NOTE
Updated Pre-Screen Assessment     Name: Anali Pan  MRN: [id number]  : 1965    Medical Status/ Changes:     Dr. Orozco progress note 23:  Date of Service  2023     Chief Complaint  Anali Pan is a 57 y.o. female admitted 2023 with headache     Hospital Course  57-year-old female with history of hypertension and alcoholism presented  with headache and right facial droop.  Initially patient went to [facility] emergency department and a NIH score was around 5, CT scan for head showed left basal ganglia and thalamic hemorrhagic stroke.  Patient was transferred to our facility for higher level of care, patient was admitted to ICU, nicardipine infusion to control blood pressure was initiated.  Patient was evaluated by intensivist and neurologist on admission, CTA for head did not show any significant stenosis in bilateral internal carotid.  MRI for head with and without contrast showed acute and subacute hemorrhage in the left thalamus and adjust partial corona radiata with mass-effect upon the posterior third ventricle and slight midline shift measured 4 to 5 mm.     Patient has history of alcoholism and drinking around 5-7 drinks daily, patient is on Precedex at the ICU and on the floor start with CIWA protocol.      Patient was transferred to neuro floor on . Per neurology Bp goal 100-140 STRICT and Na goal 135-145. On salt tabs     Interval Problem Update  Seen patient and daughter at bedside  Reports headache, improving  Off nicardipin gtt  Bp still high , cont amlodipine and clonidine. Sbp 150 this am. Increased losartan to 50mg daily  Cont salt tabs. Na 135 today  K 3.5 replaced  PT/OT/PMR     I have discussed this patient's plan of care and discharge plan at IDT rounds today with Case Management, Nursing, Nursing leadership, and other members of the IDT team.     Consultants/Specialty  neurology     Code Status  Full Code     Disposition  Patient is not medically cleared for  discharge.   Anticipate discharge to to an inpatient rehabilitation hospital.  I have placed the appropriate orders for post-discharge needs.     Review of Systems  Review of Systems   Neurological:  Positive for sensory change, focal weakness, weakness and headaches.   All other systems reviewed and are negative.      Physical Exam  Temp:  [36.6 °C (97.9 °F)-37.2 °C (99 °F)] 36.6 °C (97.9 °F)  Pulse:  [70-85] 77  Resp:  [16-18] 17  BP: (139-163)/() 152/92  SpO2:  [93 %-98 %] 93 %     Physical Exam  Vitals and nursing note reviewed.   Constitutional:       Appearance: Normal appearance. She is ill-appearing.   HENT:      Head: Normocephalic and atraumatic.      Nose: Nose normal.      Mouth/Throat:      Pharynx: Oropharynx is clear.   Eyes:      Extraocular Movements: Extraocular movements intact.      Conjunctiva/sclera: Conjunctivae normal.      Pupils: Pupils are equal, round, and reactive to light.   Cardiovascular:      Rate and Rhythm: Normal rate and regular rhythm.      Pulses: Normal pulses.      Heart sounds: Normal heart sounds.   Pulmonary:      Effort: Pulmonary effort is normal.      Breath sounds: Normal breath sounds.   Abdominal:      General: Abdomen is flat. Bowel sounds are normal.      Palpations: Abdomen is soft.   Musculoskeletal:         General: Normal range of motion.      Cervical back: Normal range of motion and neck supple.   Skin:     General: Skin is warm and dry.   Neurological:      Mental Status: She is alert and oriented to person, place, and time.      Cranial Nerves: Cranial nerve deficit present.      Sensory: Sensory deficit present.      Coordination: Coordination abnormal.      Comments: R sided weakness, worse on RUE.    Psychiatric:         Mood and Affect: Mood normal.         Behavior: Behavior normal.      Fluids     Intake/Output Summary (Last 24 hours) at 2/5/2023 1119  Last data filed at 2/5/2023 1000  Gross per 24 hour  Intake 240 ml  Output --  Net 240 ml      Laboratory  Recent Labs    02/03/23  0352 02/04/23  0304 02/05/23  0459  WBC 6.0 5.7 5.6  RBC 3.92* 4.11* 4.18*  HEMOGLOBIN 12.5 13.1 13.4  HEMATOCRIT 37.7 39.2 39.2  MCV 96.2 95.4 93.8  MCH 31.9 31.9 32.1  MCHC 33.2* 33.4* 34.2  RDW 44.2 43.8 42.9  PLATELETCT 313 340 362  MPV 9.5 9.3 9.3     Recent Labs    02/03/23  0352 02/04/23  0304 02/05/23  0459  SODIUM 131* 132* 135  POTASSIUM 4.3 3.6 3.5*  CHLORIDE 100 101 102  CO2 21 20 22  GLUCOSE 116* 122* 114*  BUN 18 16 14  CREATININE 0.69 0.49* 0.52  CALCIUM 8.9 9.1 9.4     Imaging  EC-ECHOCARDIOGRAM COMPLETE W/O CONT  Final Result     MR-BRAIN-WITH & W/O  Final Result        Acute-subacute hemorrhage in the left thalamus and adjacent parietal corona radiata with mass effect upon the posterior third ventricle and slight midline shift measuring 4 to 5 mm. Surrounding edema noted which extends to the cerebral peduncle on the   tectum.     No abnormal vascularity or abnormal enhancement is identified in the vicinity of the hemorrhage to suggest an underlying lesion.     Nonspecific T2 hyperintensities are noted in the periventricular and deep white matter, most likely related to chronic microvascular ischemia.     MR-MRA HEAD-W/O  Final Result     Normal intracranial MRA.     Assessment/Plan  * Nontraumatic acute hemorrhage of basal ganglia (HCC)- (present on admission)  Assessment & Plan  Likely related to uncontrolled hypertension  MRI showed subacute hemorrhagic left thalamus  CT head did not show obstruction on the carotid artery  Bp goal 100-140 STRICT, continue amlodipine, clonidine patch.  Add losartan  Echo showed PFO versus ASD, discussed with neurology, not related to the stroke.  PT and OT and physiatry consult, likely transfer to rehab next week.  Okay by neuro to start DVT prophylaxis  Neurology on board, appreciate recommendations     Hypertension  Assessment & Plan  Currently on amlodipine, losartan and clonidine patch  Per neurology Bp goal 100-140  STRICT     PFO (patent foramen ovale)  Assessment & Plan  Echo showed PFO versus ASD, right-to-left shunt with no signs of pulmonary hypertension.  Case was discussed with neurology team, patient has hemorrhagic stroke related to uncontrolled hypertension, and PFO was not related to the stroke.  To follow-up with cardiology as outpatient, no indication at this time for aspirin or closing     Dyslipidemia  Assessment & Plan  Continue atorvastatin     Alcohol use  Assessment & Plan  Drinking around 5 drinks every day  Patient was on Precedex in the ICU  Patient had delirium and agitation and needed restraint  Continue multivitamin and thiamine  Start with CIWA at the floor  Continue nonpharmacological treatment for delirium  Alcohol cessation counseling      Hyponatremia- (present on admission)  Assessment & Plan  Monitoring with labs daily, fluid restriction  Patient is on salt tablets by intensivist  The goal 135-145     VTE prophylaxis: SCDs/TEDs and enoxaparin ppx     Functional Status/ Changes:     Assessment     Pt seen for OT tx. Continues to be limited by decreased activity tolerance, balance deficits and inattention impacting ability to complete ADLs and ADL transfers independently. RUE w/ limited ROM and poor coordination. Required PROM to complete full elbow extension d/t hypertonicity but able to break up tone w/ gentle ROM. Provided AE to increase independence in self feeding tasks. Mod A self feeding using RUE w/ HHA for support d/t RUE weakness and limited ROM. Pt perseverating on her visual impairments making it difficult to complete ADLs. Min A sit > stand, min A txf from chair > BSC. Will continue to follow while in house.      Plan     O.T. Treatment Plan: Continue Current Treatment Plan     DC Equipment Recommendations: Unable to determine at this time  Discharge Recommendations: Recommend post-acute placement for additional occupational therapy services prior to discharge home          02/06/23  0919  Balance  Sitting Balance (Static) Fair -  Sitting Balance (Dynamic) Fair -  Standing Balance (Static) Poor  Standing Balance (Dynamic) Poor -  Weight Shift Sitting Fair  Weight Shift Standing Poor  Bed Mobility   Comments up in chair pre and post session  Activities of Daily Living  Grooming Moderate Assist;Seated  Upper Body Dressing Moderate Assist  Lower Body Dressing Maximal Assist  Toileting Moderate Assist  Functional Mobility  Sit to Stand Minimal Assist  Bed, Chair, Wheelchair Transfer Minimal Assist  Toilet Transfers Minimal Assist  Short Term Goals  Short Term Goal # 1 pt will groom seated EOB w/ setup  Goal Outcome # 1 Progressing as expected  Short Term Goal # 2 pt will demo ADL txfs with [place]  Goal Outcome # 2 Progressing as expected  Short Term Goal # 3 pt will demo toileting with supv  Goal Outcome # 3 Goal not met  Anticipated Discharge Equipment and Recommendations  DC Equipment Recommendations Unable to determine at this time  Discharge Recommendations Recommend post-acute placement for additional occupational therapy services prior to discharge home     Assessment     Pt progressing as expected given diagnosis. Pt with improved tolerance for EOB, progressed to STS and taking 1 side step toward R with VC/TC cues for sequencing, initiation, weight shifting and calming presence given anxiety. Pt motivated to improve and receptive to education. Pt presents with impaired cognition, balance, motor planning/sequencing/initiation, coordination, R>LLE weakness, decreased activity tolerance and endurance. Pt noted to have fractionated movement at each joint in RLE and RUE, however, with cues for multi joint tasks pt noted to have impaired control over movements. Recommend placement. Will continue to follow.      Plan     Physical Therapy Treatment Plan  Physical Therapy Treatment Plan: Continue Current Treatment Plan     DC Equipment Recommendations: Unable to determine at this time  Discharge  "Recommendations: Recommend post-acute placement for additional physical therapy services prior to discharge home     Subjective  \" I don't want to be a limp lady.\"      Objective       02/03/23 1147  Vitals  O2 (LPM) 2  O2 Delivery Device Silicone Nasal Cannula  Vitals Comments BP within parameters throughout session  Pain 0 - 10 Group  Therapist Pain Assessment Nurse Notified;0  Cognition   Cognition / Consciousness X  Speech/ Communication Delayed Responses;Word Finding Impairment;Dysarthric;Expressive Aphasia  Level of Consciousness Alert  Ability To Follow Commands 1 Step  Safety Awareness Impaired  Attention Impaired  Sequencing Impaired  Initiation Impaired  Comments Pleasant and cooperative. Improved speech, however, still word finding difficulty. Difficulty sequencing tasks, however, reports motivated to get better and attentive to education  Passive ROM Lower Body  Passive ROM Lower Body WDL  Active ROM Lower Body   Active ROM Lower Body  X  Comments Intact BLE AROM, however, impaired by motor planning/sequencing  Strength Lower Body  Lower Body Strength  X  Comments LLE 4/5, RLE 3+/5.  Sensation Lower Body  Lower Extremity Sensation   X  Comments LLE WDL to pain and light touch, however, RLE absent light touch and impaired to pain. Could feel deep pressure BLE however  Lower Body Muscle Tone  Lower Body Muscle Tone  X  Rt Lower Extremity Muscle Tone Hypertonic;Fluctuating  Comments Continued R ankle inversion posturing, RUE flexor posturing  Neuro-Muscular Treatments  Neuro-Muscular Treatments Anterior weight shift;Weight Shift Right;Weight Shift Left;Verbal Cuing;Tactile Cuing;Tapping;Sequencing;Postural Facilitation;Joint Approximation;Facilitation;Compensatory Strategies  Comments Initially requiring Brenton at EOB for balance with LUE support 2/2 R lean, however improved to CGA. Difficulty with weight shifting L/R in seated without falling over. In standing, requiring facilitation to advanced RLE and " weight shift toward L depsite pt attempts.  Vision  Vision Comments Pt wearing her glasses this session  Other Treatments  Other Treatments Provided therapeutic listening and breathing techniques provided throughout. Pt with increased anxiety with attempting new mobility (further EOB, scooting, standing) and could not tolerate taking a BP at EOB. Redirectable when calm environment provided. 2 daughters also present providing positive reinforcement.  Neurological Concerns  Neurological Concerns Yes  Sitting Posture During ADL's Pushes to the Right;Lateral Lean Right  Balance  Sitting Balance (Static) Fair -  Sitting Balance (Dynamic) Poor +  Standing Balance (Static) Trace +  Standing Balance (Dynamic) Trace  Weight Shift Sitting Poor  Weight Shift Standing Poor  Skilled Intervention Verbal Cuing;Tactile Cuing;Sequencing;Postural Facilitation;Compensatory Strategies  Comments via B HHA/contact assist  Bed Mobility   Supine to Sit Moderate Assist  Sit to Supine Moderate Assist  Scooting Moderate Assist  Rolling Moderate Assist to Lt.  Skilled Intervention Verbal Cuing;Tactile Cuing;Sequencing;Postural Facilitation;Compensatory Strategies  Comments x2 persons for safety  Gait Analysis  Gait Level Of Assist Unable to Participate  Comments able to take 1 step toward R with ModAx2 and assist from therapist to advance RLE. Noted R ankle inversion/DF  Functional Mobility  Sit to Stand Minimal Assist  (x2 person)  Bed, Chair, Wheelchair Transfer Unable to Participate  (deferred given pt's anxiety with standing)  Mobility STS x2, return to supine  Skilled Intervention Verbal Cuing;Tactile Cuing;Sequencing;Compensatory Strategies;Postural Facilitation  ICU Target Mobility Level  ICU Mobility - Targeted Level Level 3A  How much difficulty does the patient currently have...  Turning over in bed (including adjusting bedclothes, sheets and blankets)? 1  Sitting down on and standing up from a chair with arms (e.g., wheelchair,  bedside commode, etc.) 1  Moving from lying on back to sitting on the side of the bed? 1  How much help from another person does the patient currently need...  Moving to and from a bed to a chair (including a wheelchair)? 2  Need to walk in a hospital room? 2  Climbing 3-5 steps with a railing? 1  6 clicks Mobility Score 8  Activity Tolerance  Sitting in Chair NT  Sitting Edge of Bed 5-8 min  Standing 2 min  Short Term Goals   Short Term Goal # 1 Pt will transition from supine to EOB w/ [place] in 6 visits to improve independence in bed mobility  Goal Outcome # 1 Progressing as expected  Short Term Goal # 2 Pt will demonstrate fair- balance at EOB w/ BUE support w/ SPV in 6 visits  Goal Outcome # 2 Goal met  Short Term Goal # 3 Pt will transfer from EOB to chair w/ [place] in 6 visits to improve OOB mobility  Goal Outcome # 3 Progressing as expected  Short Term Goal # 4 Pt will ambulate 150 ft w/ LRAD w/ SPV in 6 visits to access household distances  Goal Outcome # 4 Goal not met  Short Term Goal # 5 Pt will negotiate 1 step w/ [place] in 6 visits to access household  Goal Outcome # 5 Goal not met  Education Group  Education Provided Role of Physical Therapist;Cerebral Vascular Accident  Role of Physical Therapist Patient Response Patient;Family;Acceptance;Explanation;Verbal Demonstration  CVA Patient Response Patient;Family;Acceptance;Explanation;Verbal Demonstration  Additional Comments Educated pt and family on focus on RUE/LE control with movements, continued OOB mobility with RN staff as able  Physical Therapy Treatment Plan  Physical Therapy Treatment Plan Continue Current Treatment Plan  Anticipated Discharge Equipment and Recommendations  DC Equipment Recommendations Unable to determine at this time  Discharge Recommendations Recommend post-acute placement for additional physical therapy services prior to discharge home  Interdisciplinary Plan of Care Collaboration  IDT Collaboration with  Nursing;Family /  Caregiver;Physician  Patient Position at End of Therapy In Bed;Call Light within Reach;Phone within Reach;Family / Friend in Room  Collaboration Comments RN updated  Session Information  Date / Session Number  3- 2 (, )    Reviewer: Luis Angel Murphy L.P.N.  Date: 2023  Time: 9:52 AM  Pre-Admission Screening Form    Patient Information:   Name: Anali Pan     MRN: [id number]       : 1965      Age: 57 y.o.   Gender: female      Race: White [7]       Marital Status: Single [1]  Family Contact: Elena Adorno,Missy Pan,Otoniel Sanchez        Relationship: Mother [8]  Daughter [2]  Daughter [2]  Son [15]  Home Phone: [phone]                 Cell Phone:   [phone]  Advanced Directives: None  Code Status:  FULL  Current Attending Provider: Treasure More M.D.  Referring Physician: Dr. Aguayo  Physiatrist Consult: Dr. Marcum    Referral Date: 23  [facility]  Secondary Payor Source:  [employer]    Medical Information:   Date of Admission to Acute Care [place]2023  Room Number: R102/00  Rehabilitation Diagnosis: 0001.2 - Stroke: Right Body Involvement (Left Brain)  Immunization History   Administered Date(s) Administered    Tdap Vaccine 2016     No Known Allergies  Past Medical History:   Diagnosis Date    Hypertension      Past Surgical History:   Procedure Laterality Date    OVARIAN CYSTECTOMY         History Leading to Admission, Conditions that Caused the Need for Rehab (CMS):     Dr. Aguayo H&P:  Reason for Consultation  ICH     History of Presenting Illness  57 y.o. female with a history of HTN, EtOH use presented to [facility] with acute onset headache and right sided facial droop.  Found to have left BG/thalamic hemorrhage, started on nicardipine infusion.  Not anticoagulated.  Reportedly drinks 5-7 drinks daily, no other substance use known.  Family requested transfer from [facility] (see  transfer center note and encounter note from TC).      Assessment/Plan  * Nontraumatic acute hemorrhage of basal ganglia (HCC)- (present on admission)  Assessment & Plan  Hypertensive etiology  Admit ICU  q1 hour neuro checks     SBP <140     PRN:  - Labetalol  - Hydralazine     Nicardipine gtt      No VTE ppx/antiplatelet   q6 Na - ok for normonatremia currently, if worsening edema / shift on next CT will add 3%  Mannitol 50mg q6 if concern for elevated ICP  Repeat CT head w/out contrast for clinical change  MRI brain w/wo contrast (ich protocol)  MRA brain without contrast  Unless traumatic, no indication for seizure prophylaxis  Platelet >100k, INR <1.5  Echocardiogram     HOB > 30 degrees  PT/OT/SLP  Avoid lying flat when possible     Neurology discussed with OSH on transfer center call, aware of the patient.  Will re-engage them in the morning     Essential hypertension- (present on admission)  Assessment & Plan  Continue home meds when med rec complete  Nicardipine for now  PRNs available     Alcohol use  Assessment & Plan  5-7 drinks daily per report  IV thiamine  Monitor for withdrawal  Add etoh level (though she has been at [facility] for several hours)           DVT prophylaxis: Contraindicated  PUD prophylaxis: NA  Glycemic control: SSI if needed  Nutrition: NPO until swallow eval (RN eval ok, but likely will need SLP)  Lines: CANDACE Braga: CANDACE Merlos (Neurology) recommendations:  ASSESSMENT AND PLAN:  57-year old female with Pmhx significant for hypertension, medical non compliance, ETOH abuse (5-6 drinks per day) who presented to [facility] as a transfer from [facility] where she presented for a chief complaint of Right sided weakness and difficulty speaking/altered mentation; on arrival to OSH, SBP 200s; CT head acute Left thalamic hemorrhage, perhaps with some involvement of the midbrain; no obvious intraventricular hemorrhage. Etiology hypertensive. ICH score 1. Plan for ICU  care-- strict BP control, normonatremia for now; monitoring for ETOH withdrawal.      Recommendations/Plan:      -q2h and PRN neuro assessment. VS per nursing/unit protocol. -140 strict; Antihypertensives per primary/ICU team. Wean nicardipine, re start PO antihypertensives as soon as possible.   -No anticoagulation/antithrombotics at this time.   -MRI Brain completed early this morning-- expected mass effect, no obvious underlying lesion.   -Maintain strict euthermia, euglycemia, eunatremia. Current Na is 137. q6h Na checks.   -PT/OT/SLP eval and treat, when appropriate.   -Will defer all other medical management to ICU team. CIWA protocol per ICU team.   -DVT PPX: SCDs.     Dr. Marcum (Physiatry) recommendations:  ASSESSMENT:  Patient is a 57 y.o. female admitted with right sided weakness      [facility] Code / Diagnosis to Support: 0001.2 - Stroke: Right Body Involvement (Left Brain)     Rehabilitation: Impaired ADLs and mobility  Patient is a good candidate for inpatient rehab based on needs for PT, OT, and speech therapy     Barriers to transfer include: Insurance authorization, TCCs to verify disposition, medical clearance and bed availability      Additional Recommendations:  Left thalamic ICH  - presented with R sided weakness and expressive aphasia  - images with evidence of  left thalamic hemorrhage   - neuro consulted, nicaridipine drip   - continue with PT/OT  - was unable to participate with therapies due to EtOH w/d notified therapy for need for updated eval to determine functional status prior to submitting to insurance for auth to IRF      Hypertension  - nicardipine drip, SBP goal < 140   - on amlodpine and      Alcohol abuse  Alcohol withdrawal  - 5-6 drinks per day  - [facility] protocol, precedex drip   - improved today, no longer needing soft restraints      Dysphagia  - seen by SLP   - upgraded to minced and moist with thin liquids      Hyponatremia  - Na 131   - secondary to alcohol wd  - primary  team monitoring electrolytes      Dispo:  - patient is currently functioning below their level of baseline, recommend post acute rehab  - recommend IRF level therapy with 3hr of therapy 5 days per week  - piror to acceptance to IRF, will need insurance auth from [facility] to assist with insurance auth and DC support      Medical Complexity:  Left thalamic ICH  Hypertension  Alcohol abuse  Alcohol withdrawal  Dysphagia  Hyponatremia  Impaired mobility and ADLs     DVT PPX: Lovenox    Admit Date:    1/31/2023     Resident(s):   Sarkis Camara M.D.   Attending:      NICOLAS BARTHOLOMEW/ Dr. Gonda     Patient ID:    Name:              Anali Pan     YOB: 1965  Age:                 57 y.o.  female              MRN:               [id number]     Hospital Course:  57-year-old female with a past medical history significant for hypertension who was last known well at 8 PM tonight when she developed sudden onset of headache and right facial droop.  The patient was brought to [facility] emergency department where she had an NIH scale of 4-5.  CT of the brain showed a left basal ganglia and thalamic hemorrhagic stroke.  The patient was started on nicardipine infusion for systolic blood pressures less than 160.     Consultants:  Critical Care  Neurology     Interval Events:  Transitioning to Q4h neuro checks  Blood pressure within goals  Increased salt tabs; Na 131 this AM; goal 135-145  Lovenox DVT ppx  Goal for dispo to get to rehab  Stable for downgrade to Neuro floor     Vitals Range last 24h:  Pulse:  [57-85] 69  Resp:  [11-33] 20  BP: (112-159)/() 130/75  SpO2:  [93 %-99 %] 96 %        Intake/Output Summary (Last 24 hours) at 2/3/2023 1032  Last data filed at 2/3/2023 1000    Gross per 24 hour  Intake 1225.2 ml  Output 445 ml  Net 780.2 ml     ROS   Unable to perform ROS: critical illness      PHYSICAL EXAM:    Vitals  Vitals:    02/03/23 0830 02/03/23 0900 02/03/23 0930 02/03/23  1000  BP: 131/78 135/84 (!) 146/109 130/75  Pulse: 64 69 72 69  Resp: 13 16 18 20  Temp:          TempSrc:          SpO2: 98% 99% 95% 96%  Weight:          Height:             Body mass index is 26.76 kg/m².     O2 therapy: Pulse Oximetry: 96 %, O2 (LPM): 2, O2 Delivery Device: Silicone Nasal Cannula     Date 02/03/23 0700 - 02/04/23 0659  Shift 5652-8476 5578-4015 4188-4375 24 Hour Total  INTAKE  P.O. 120     120    P.O. 120     120  I.V. 508.4     508.4    Precedex Volume 8.2     8.2    Volume (mL) (NS (BOLUS) infusion 500 mL) 500.1     500.1  IV Piggyback 61.6     61.6    Volume (mL) (thiamine (B-1) 200 mg in dextrose 5% 100 mL IVPB) 61.6     61.6  Shift Total 690     690  OUTPUT  Urine 150     150    Output (mL) (Urethral Catheter Temperature probe 16 Fr.) 150     150  Shift Total 150     150       540                Physical Exam  Constitutional:       General: She is awake. She is not in acute distress.     Appearance: Normal appearance. She is well-developed. She is not diaphoretic.      Interventions: She is restrained. Nasal cannula in place.   HENT:      Head: Normocephalic and atraumatic.      Nose: Nose normal.      Mouth/Throat:      Mouth: Mucous membranes are moist.      Pharynx: Oropharynx is clear.   Eyes:      Extraocular Movements: Extraocular movements intact.      Conjunctiva/sclera: Conjunctivae normal.      Pupils: Pupils are equal, round, and reactive to light.   Neck:      Vascular: No JVD.      Trachea: No tracheal deviation.   Cardiovascular:      Rate and Rhythm: Normal rate and regular rhythm. Occasional Extrasystoles are present.     Chest Wall: PMI is displaced.      Pulses: Normal pulses.      Heart sounds: Normal heart sounds. No murmur heard.     Comments: Remains hypertensive  Pulmonary:      Effort: Pulmonary effort is normal. No accessory muscle usage or respiratory distress.      Breath sounds: Normal breath sounds. No wheezing, rhonchi or rales.      Comments: Strong  cough, protecting airway  Abdominal:      General: Bowel sounds are normal. There is no distension.      Palpations: Abdomen is soft.      Tenderness: There is no abdominal tenderness. There is no guarding or rebound.   Genitourinary:     Comments: Braga catheter in place  Musculoskeletal:         General: No tenderness.      Cervical back: Neck supple. No tenderness.      Right lower leg: No edema.      Left lower leg: No edema.   Skin:     General: Skin is warm and dry.      Capillary Refill: Capillary refill takes less than 2 seconds.      Coloration: Skin is not pale.   Neurological:      Mental Status: She is alert.      Comments: Persistent right-sided weakness worse in the upper than the lower extremity, follows commands on the left but has some expressive aphasia, right facial droop, agitated and pulling out catheters at times   Psychiatric:         Behavior: Behavior is cooperative.       Recent Labs    02/01/23 0600 02/02/23 0350 02/02/23 0600 02/03/23 0352  SODIUM 137  --  130* 131*  POTASSIUM 3.3*  --  3.7 4.3  CHLORIDE 102  --  97 100  CO2 23  --  21 21  BUN 13  --  7* 18  CREATININE 0.55  --  0.40* 0.69  MAGNESIUM 2.1 1.8  --  2.2  CALCIUM 9.0  --  9.2 8.9     Recent Labs    01/31/23 2055 02/01/23 0600 02/02/23 0600 02/03/23 0352  ALTSGPT 30 28  --   --   ASTSGOT 42* 28  --   --   ALKPHOSPHAT 95 96  --   --   TBILIRUBIN 0.4 0.6  --   --   GLUCOSE 108* 125* 123* 116*     Recent Labs    02/01/23 0600 02/02/23 0350 02/03/23 0352  RBC 4.26 4.14* 3.92*  HEMOGLOBIN 13.7 13.1 12.5  HEMATOCRIT 39.9 38.9 37.7  PLATELETCT 339 345 313    Recent Labs    01/31/23 2055 02/01/23 0600 02/02/23 0350 02/03/23 0352  WBC  --  7.0 7.6 6.0  NEUTSPOLYS 55.4  --   --   --   LYMPHOCYTES 30.3  --   --   --   MONOCYTES 7.1  --   --   --   EOSINOPHILS 6.8*  --   --   --   BASOPHILS 0.4  --   --   --   ASTSGOT 42* 28  --   --   ALTSGPT 30 28  --   --   ALKPHOSPHAT 95 96  --   --   TBILIRUBIN 0.4 0.6  --   --       Meds:     sodium chloride  2 g     enoxaparin (LOVENOX) injection  40 mg     amLODIPine  10 mg     thiamine  100 mg Stopped (02/03/23 0642)   enalaprilat  1.25 mg     hydrALAZINE  10 mg     labetalol  10 mg     atorvastatin  10 mg     senna-docusate  2 Tablet      And   polyethylene glycol/lytes  1 Packet      And   magnesium hydroxide  30 mL      And   bisacodyl  10 mg     LORazepam  2 mg     acetaminophen  650 mg      Or   acetaminophen  650 mg     ondansetron  4 mg      Or   ondansetron  4 mg     MD Alert...Adult ICU Electrolyte Replacement per Pharmacy                    Imaging:    EC-ECHOCARDIOGRAM COMPLETE W/O CONT  Final Result     MR-BRAIN-WITH & W/O  Final Result     Acute-subacute hemorrhage in the left thalamus and adjacent parietal corona radiata with mass effect upon the posterior third ventricle and slight midline shift measuring 4 to 5 mm. Surrounding edema noted which extends to the cerebral peduncle on the   tectum.     No abnormal vascularity or abnormal enhancement is identified in the vicinity of the hemorrhage to suggest an underlying lesion.     Nonspecific T2 hyperintensities are noted in the periventricular and deep white matter, most likely related to chronic microvascular ischemia.     MR-MRA HEAD-W/O  Final Result     Normal intracranial MRA.     ASSESSEMENT and PLAN:     * Nontraumatic acute hemorrhage of basal ganglia (HCC)- (present on admission)  Assessment & Plan  Secondary to uncontrolled hypertension  Neurology consulted  Continue with very strict blood pressure management with goal SBP less than 140  Lovenox DVT prophylaxis monitoring closely  Q4 hour neurochecks  Seizure and aspiration precautions  PT/OT/SLP evaluation  Goal eunatremia (increased salt tabs today), euthermia, euvolemia (encourage oral hydration), euglycemia     Hypomagnesemia  Assessment & Plan  At goal today; monitor closely     Hypokalemia  Assessment & Plan  At goal today; continue to monitor      Dyslipidemia  Assessment & Plan  Continue with low-dose atorvastatin  RD consulted     Alcohol use  Assessment & Plan  Continue vitamin supplementation x 5 days  Monitor for alcohol withdrawal syndrome - early development 2/1  Eventual alcohol cessation education and resources to be provided when appropriate  Continue to titrate Precedex gtt, d/c librium to prevent clouding neuro exam  If further escalating withdrawal symptoms, consider gabapentin +/- clonidine in addition to dex gtt     Hypertensive emergency- (present on admission)  Assessment & Plan  Resumed outpatient dose of telmisartan yesterday  BP goal SBP less than 140; ideally 110-140 systolic  Amlodipine 10 mg daily  IV as needed Vasotec, hydralazine, labetalol     Sarkis Camara M.D.     Cosigned by: Jeremy M Gonda, M.D. at 2/3/2023 10:59 AM    Brain MRI 02-01-23:  FINDINGS:     An acute-subacute hemorrhage is noted in the left thalamus extending to adjacent left parietal coronal radiata with surrounding vasogenic edema. There is mild mass effect upon the posterior third ventricle with slight midline shift to the right measuring   4 to 5 mm. This hematoma measures 21 x 39 x 27 mm. Edema extends into the midbrain along the dorsal aspect of the cerebral peduncle and the tectum. Edema also extends minimally into the left temporal lobe.     Nonspecific T2 hyperintensities are noted in the periventricular and deep white matter, most likely related to chronic microvascular ischemia.     Postcontrast images through the whole brain does not demonstrate any abnormal intracranial enhancement. A linear vascular structure is identified amidst the hemorrhage but without other abnormal features suggesting that this may represent an   entrapped/encased blood vessel. No definite evidence of an arterial venous malformation or aneurysm is seen.  Visualized intracranial arterial flow voids are within normal limits.  Bone marrow signal in the calvarium is within  "normal limits.  Included portions of the paranasal sinuses are within normal limits.  Included portions of the mastoid air cells are within normal limits.  Included portions of the orbits are within normal limits     IMPRESSION:     Acute-subacute hemorrhage in the left thalamus and adjacent parietal corona radiata with mass effect upon the posterior third ventricle and slight midline shift measuring 4 to 5 mm. Surrounding edema noted which extends to the cerebral peduncle on the   tectum.     No abnormal vascularity or abnormal enhancement is identified in the vicinity of the hemorrhage to suggest an underlying lesion.     Nonspecific T2 hyperintensities are noted in the periventricular and deep white matter, most likely related to chronic microvascular ischemia.    Co-morbidities:  See PMH  Potential Risk - Complications: Aphasia, Cognitive Impairment, Contractures, Deep Vein Thrombosis, Dysphagia, Incontinence, Malnutrition, Pain, Paralysis, Perceptual Impairment, Pneumonia, Pressure Ulcer, and Urinary Tract Infection  Level of Risk: High    Ongoing Medical Management Needed (Medical/Nursing Needs):   Patient Active Problem List    Diagnosis Date Noted    Hypomagnesemia 02/02/2023    Alcohol use 02/01/2023    Dyslipidemia 02/01/2023    Hypokalemia 02/01/2023    Nontraumatic acute hemorrhage of basal ganglia (HCC) 01/31/2023    Hyponatremia 04/05/2019    HPV (human papilloma virus) infection 05/26/2016    Hypertensive emergency 03/22/2016     Alert with cognitive impairment.    Current Vital Signs:   Temperature: 37.7 °C (99.9 °F) Pulse: 73 Respiration: (!) 24 Blood Pressure: 133/86  Weight: 79.8 kg (175 lb 14.8 oz) Height: 172.7 cm (5' 7.99\")  Pulse Oximetry: 99 % O2 (LPM): 2      Completed Laboratory Reports:  Recent Labs     01/31/23 2055 02/01/23  0040 02/01/23 0600 02/02/23  0350 02/02/23  0600 02/03/23  0352   WBC  --   --  7.0 7.6  --  6.0   HEMOGLOBIN 13.9  --  13.7 13.1  --  12.5   HEMATOCRIT 40.4  --  " 39.9 38.9  --  37.7   PLATELETCT 378  --  339 345  --  313   SODIUM 134* 135 137  --  130* 131*   POTASSIUM 4.2  --  3.3*  --  3.7 4.3   BUN 22*  --  13  --  7* 18   CREATININE 0.71  --  0.55  --  0.40* 0.69   ALBUMIN 4.6  --  4.5  --   --   --    GLUCOSE 108*  --  125*  --  123* 116*     Additional Labs: Not Applicable    Prior Living Situation:   Housing / Facility: 1 Story House  Steps Into Home: 1  Steps In Home: 0  Lives with - Patient's Self Care Capacity: Alone and Able to Care For Self  Equipment Owned: Single Point Cane, Front-Wheel Walker, Bed Side Commode (Pt's mom in room and states she has access to this equipment to give to daugther if needed)    Prior Level of Function / Living Situation:   Physical Therapy: Prior Services: Home-Independent  Housing / Facility: 1 Story House  Steps Into Home: 1  Steps In Home: 0  Rail: None  Bathroom Set up: Walk In Shower  Equipment Owned: Single Point Cane, Front-Wheel Walker, Bed Side Commode (Pt's mom in room and states she has access to this equipment to give to daugther if needed)  Lives with - Patient's Self Care Capacity: Alone and Able to Care For Self  Bed Mobility: Independent  Transfer Status: Independent  Ambulation: Independent  Distance Ambulation (Feet):  (community)  Assistive Devices Used: None  Stairs: Independent  Current Level of Function:   Gait Level Of Assist: Unable to Participate  Supine to Sit: Moderate Assist  Sit to Supine: Moderate Assist  Scooting: Moderate Assist  Rolling: Moderate Assist to Lt.  Skilled Intervention: Verbal Cuing, Tactile Cuing, Sequencing, Postural Facilitation, Compensatory Strategies  Comments: x2 persons for safety  Sit to Stand: Minimal Assist (x2 person)  Bed, Chair, Wheelchair Transfer: Unable to Participate (deferred given pt's anxiety with standing)  Skilled Intervention: Verbal Cuing, Tactile Cuing, Sequencing, Compensatory Strategies, Postural Facilitation  Sitting in Chair: NT  Sitting Edge of Bed: 5-8  min  Standin min  Occupational Therapy:   Self Feeding: Independent  Grooming / Hygiene: Independent  Bathing: Independent  Dressing: Independent  Toileting: Independent  Medication Management: Independent  Laundry: Independent  Kitchen Mobility: Independent  Finances: Independent  Home Management: Independent  Shopping: Independent  Prior Level Of Mobility: Independent Without Device in Community, Independent Without Device in Home  Driving / Transportation: Driving Independent  Prior Services: Home-Independent  Housing / Facility: [address]  Occupation (Pre-Hospital Vocational):  (works 1 day a week as a )  Leisure Interests: Pets  Current Level of Function:      Speech Language Pathology:   Problem List: Dysphagia (aphasia - needs further assessment)  Diet / Liquid Recommendation: Minced & Moist (5) - (Dysphagia II), Thin (0)  Rehabilitation Prognosis/Potential: Fair  Estimated Length of Stay: 14-21 days    Nursing:      Incontinent    Scope/Intensity of Services Recommended:  Physical Therapy: 1 hr / day  5 days / week. Therapeutic Interventions Required: Maximize Endurance, Mobility, Strength, and Safety  Occupational Therapy: 1 hr / day 5 days / week. Therapeutic Interventions Required: Maximize Self Care, ADLs, IADLs, and Energy Conservation  Speech & Language Pathology: 1 hr / day 5 days / week. Therapeutic Interventions Required: Maximize Cognition, Swallowing, and Safety  Rehabilitation Nursin/7. Therapeutic Interventions Required: Monitor Pain, Skin, Vital Signs, Intake and Output, Labs, Safety, Aspiration Risk, and Family Training  Rehabilitation Physician: 3 - 5 days / week. Therapeutic Interventions Required: Medical Management  Respiratory Care: Pulmonary Toileting. Therapeutic Interventions Required: Pulmonary Toileting, O2 Weaning, and Aspiration Risk  Dietician: Nutritional Assessment/Education. Therapeutic Interventions Required: .    She requires 24-hour rehabilitation  nursing to manage skin care, nutrition and fluid intake, pulmonary hygiene, pain control, safety, medication management, and patient/family goals. In addition, rehabilitation nursing will reiterate and reinforce therapy skills and equipment use, including ADLs, as well as provide education to the patient and family. Anali Pan is willing to participate in and is able to tolerate the proposed plan of care.    Rehabilitation Goals and Plan (Expected frequency & duration of treatment in the IRF):   Return to the Community, Minimal Assist Level of Care, and Outpatient Support  Anticipated Date of Rehabilitation Admission: 02-06-23  Patient/Family oriented IRF level of care/facility/plan: Yes  Patient/Family willing to participate in IRF care/facility/plan: Yes  Patient able to tolerate IRF level of care proposed: Yes  Patient has potential to benefit IRF level of care proposed: Yes  Comments: Not Applicable    Special Needs or Precautions - Medical Necessity:  Safety Concerns/Precautions:  Fall Risk / High Risk for Falls, Balance, Cognition, and Bed / Chair Alarm  Pain Management  Precautions: Fall Risk;Swallow Precautions   Comments: SBP < 140 goal  IV Site: Peripheral  Requires Oxygen  Current Medications:    Current Facility-Administered Medications Ordered in Epic   Medication Dose Route Frequency Provider Last Rate Last Admin    sodium chloride (SALT) tablet 2 g  2 g Oral TID WITH MEALS Jeremy M Gonda, M.D.   2 g at 02/03/23 1149    LORazepam (ATIVAN) tablet 0.5 mg  0.5 mg Oral Q4HRS PRN Treasure More M.D.        LORazepam (ATIVAN) tablet 1 mg  1 mg Oral Q4HRS PRN Treasure More M.D.        Or    LORazepam (ATIVAN) injection 0.5 mg  0.5 mg Intravenous Q4HRS PRN Treasure More M.D.        LORazepam (ATIVAN) tablet 2 mg  2 mg Oral Q2HRS PRN Treasure More M.D.        Or    LORazepam (ATIVAN) injection 1 mg  1 mg Intravenous Q2HRS PRN Treasure More M.D.        LORazepam (ATIVAN) tablet 3 mg   3 mg Oral Q HOUR PRN Treasure More M.D.        Or    LORazepam (ATIVAN) injection 1.5 mg  1.5 mg Intravenous Q HOUR PRN Treasure More M.D.        LORazepam (ATIVAN) tablet 4 mg  4 mg Oral Q15 MIN PRN Treasure More M.D.        Or    LORazepam (ATIVAN) injection 2 mg  2 mg Intravenous Q15 MIN PRN Treasure More M.D.        cloNIDine (CATAPRES) 0.1 MG/24HR patch 1 Patch  1 Patch Transdermal Q7 DAYS Treasure More M.D.        enoxaparin (Lovenox) inj 40 mg  40 mg Subcutaneous DAILY AT 1800 Sarkis Camara M.D.   40 mg at 02/02/23 1737    amLODIPine (NORVASC) tablet 10 mg  10 mg Oral Q DAY Jeremy M Gonda, M.D.   10 mg at 02/03/23 0632    thiamine (B-1) IVPB 100 mg in 100 mL D5W (premix)  100 mg Intravenous DAILY Domingo Aguayo M.D.   Stopped at 02/03/23 0642    enalaprilat (Vasotec) injection 1.25 mg 1 mL  1.25 mg Intravenous Q6HRS PRN Domingo Aguayo M.D.   1.25 mg at 02/02/23 0855    hydrALAZINE (APRESOLINE) injection 10 mg  10 mg Intravenous Q4HRS PRN Domingo Aguayo M.D.   10 mg at 02/03/23 0948    labetalol (NORMODYNE/TRANDATE) injection 10 mg  10 mg Intravenous Q4HRS PRN Domingo Aguayo M.D.   10 mg at 02/03/23 0805    atorvastatin (LIPITOR) tablet 10 mg  10 mg Oral Q EVENING Sarkis Camara M.D.   10 mg at 02/02/23 1806    senna-docusate (PERICOLACE or SENOKOT S) 8.6-50 MG per tablet 2 Tablet  2 Tablet Oral BID Domingo Aguayo M.D.   2 Tablet at 02/03/23 0632    And    polyethylene glycol/lytes (MIRALAX) PACKET 1 Packet  1 Packet Oral QDAY PRN Domingo Aguayo M.D.        And    magnesium hydroxide (MILK OF MAGNESIA) suspension 30 mL  30 mL Oral QDAY PRN Domingo Aguayo M.D.        And    bisacodyl (DULCOLAX) suppository 10 mg  10 mg Rectal QDAY PRN Domingo Aguayo M.D.        LORazepam (ATIVAN) injection 2 mg  2 mg Intravenous Q5 MIN PRN Domingo Aguayo M.D.        acetaminophen (Tylenol) tablet 650 mg  650 mg Oral Q4HRS PRN Domingo Aguayo M.D.   650 mg at 02/03/23 1023    Or     acetaminophen (TYLENOL) suppository 650 mg  650 mg Rectal Q4HRS PRN Domingo Aguayo M.D.        ondansetron (ZOFRAN ODT) dispertab 4 mg  4 mg Oral Q4HRS PRN Domingo Aguayo M.D.        Or    ondansetron (ZOFRAN) syringe/vial injection 4 mg  4 mg Intravenous Q4HRS PRN Domingo Aguayo M.D.   4 mg at 02/03/23 1028    MD Alert...ICU Electrolyte Replacement per Pharmacy   Other PHARMACY TO DOSE Domingo Aguayo M.D.         No current [facility]-ordered outpatient medications on file.     Diet:   DIET ORDERS (From admission to next 24h)       Start     Ordered    02/03/23 0928  Dietary Snacks  ONCE        Comments: Please send cream of wheat for breakfast and a banana (RN will smash and puree)    02/03/23 0928    02/01/23 1134  Diet Order Diet: Level 5 - Minced and Moist (meds in applesauce. hold and contact SLP with difficulty!); Liquid level: Level 0 - Thin; Tray Modifications (optional): SLP - 1:1 Supervision by Nursing, SLP - Deliver to Nursing Station  ALL MEALS        Question Answer Comment   Diet: Level 5 - Minced and Moist meds in applesauce. hold and contact SLP with difficulty!   Liquid level Level 0 - Thin    Tray Modifications (optional) SLP - 1:1 Supervision by Nursing    Tray Modifications (optional) SLP - Deliver to Nursing Station        02/01/23 1133                    Anticipated Discharge Destination / Patient/Family Goal:  Destination: Home with Assistance Support System: Family   Anticipated home health services: OT, PT, SLP, Nursing, Social Work, and Aide  Previously used HH service/ provider: Not Applicable  Anticipated DME Needs: Oxygen, Walker, Wheelchair, Commode, Hospital Bed, and Life Line  Outpatient Services: OT, PT, and SLP  Alternative resources to address additional identified needs:   No future appointments.   Pre-Screen Completed: 2/3/2023 12:42 PM Luis Angel Murphy L.P.N.

## 2023-02-03 NOTE — PROGRESS NOTES
[facility]OL[facility] Progress Note      Admit Date: 1/31/2023    Resident(s): Sarkis Camara M.D.   Attending:  NICOLAS BARTHOLOMEW/ Dr. Gonda    Patient ID:    Name:  Anali Pan     YOB: 1965  Age:  57 y.o.  female   MRN:  [id number]    Hospital Course:  57-year-old female with a past medical history significant for hypertension who was last known well at 8 PM tonight when she developed sudden onset of headache and right facial droop.  The patient was brought to [facility] emergency department where she had an NIH scale of 4-5.  CT of the brain showed a left basal ganglia and thalamic hemorrhagic stroke.  The patient was started on nicardipine infusion for systolic blood pressures less than 160.    Consultants:  Critical Care  Neurology    Interval Events:  Transitioning to Q4h neuro checks  Blood pressure within goals  Increased salt tabs; Na 131 this AM; goal 135-145  Lovenox DVT ppx  Goal for dispo to get to rehab  Stable for downgrade to Neuro floor    Vitals Range last 24h:  Pulse:  [57-85] 69  Resp:  [11-33] 20  BP: (112-159)/() 130/75  SpO2:  [93 %-99 %] 96 %      Intake/Output Summary (Last 24 hours) at 2/3/2023 1032  Last data filed at 2/3/2023 1000  Gross per 24 hour   Intake 1225.2 ml   Output 445 ml   Net 780.2 ml        ROS   Unable to perform ROS: critical illness     PHYSICAL EXAM:  Vitals:    02/03/23 0830 02/03/23 0900 02/03/23 0930 02/03/23 1000   BP: 131/78 135/84 (!) 146/109 130/75   Pulse: 64 69 72 69   Resp: 13 16 18 20   Temp:       TempSrc:       SpO2: 98% 99% 95% 96%   Weight:       Height:        Body mass index is 26.76 kg/m².    O2 therapy: Pulse Oximetry: 96 %, O2 (LPM): 2, O2 Delivery Device: Silicone Nasal Cannula    Date 02/03/23 0700 - 02/04/23 0659   Shift 7583-5136 9405-6717 5061-2217 24 Hour Total   INTAKE   P.O. 120   120     P.O. 120   120   I.V. 508.4   508.4     Precedex Volume 8.2   8.2     Volume (mL) (NS (BOLUS) infusion 500 mL) 500.1   500.1   IV Piggyback 61.6    61.6     Volume (mL) (thiamine (B-1) 200 mg in dextrose 5% 100 mL IVPB) 61.6   61.6   Shift Total 690   690   OUTPUT   Urine 150   150     Output (mL) (Urethral Catheter Temperature probe 16 Fr.) 150   150   Shift Total 150   150      540        Physical Exam  Constitutional:       General: She is awake. She is not in acute distress.     Appearance: Normal appearance. She is well-developed. She is not diaphoretic.      Interventions: She is restrained. Nasal cannula in place.   HENT:      Head: Normocephalic and atraumatic.      Nose: Nose normal.      Mouth/Throat:      Mouth: Mucous membranes are moist.      Pharynx: Oropharynx is clear.   Eyes:      Extraocular Movements: Extraocular movements intact.      Conjunctiva/sclera: Conjunctivae normal.      Pupils: Pupils are equal, round, and reactive to light.   Neck:      Vascular: No JVD.      Trachea: No tracheal deviation.   Cardiovascular:      Rate and Rhythm: Normal rate and regular rhythm. Occasional Extrasystoles are present.     Chest Wall: PMI is displaced.      Pulses: Normal pulses.      Heart sounds: Normal heart sounds. No murmur heard.     Comments: Remains hypertensive  Pulmonary:      Effort: Pulmonary effort is normal. No accessory muscle usage or respiratory distress.      Breath sounds: Normal breath sounds. No wheezing, rhonchi or rales.      Comments: Strong cough, protecting airway  Abdominal:      General: Bowel sounds are normal. There is no distension.      Palpations: Abdomen is soft.      Tenderness: There is no abdominal tenderness. There is no guarding or rebound.   Genitourinary:     Comments: Braga catheter in place  Musculoskeletal:         General: No tenderness.      Cervical back: Neck supple. No tenderness.      Right lower leg: No edema.      Left lower leg: No edema.   Skin:     General: Skin is warm and dry.      Capillary Refill: Capillary refill takes less than 2 seconds.      Coloration: Skin is not pale.    Neurological:      Mental Status: She is alert.      Comments: Persistent right-sided weakness worse in the upper than the lower extremity, follows commands on the left but has some expressive aphasia, right facial droop, agitated and pulling out catheters at times   Psychiatric:         Behavior: Behavior is cooperative.       Recent Labs     02/01/23 0600 02/02/23 0350 02/02/23 0600 02/03/23 0352   SODIUM 137  --  130* 131*   POTASSIUM 3.3*  --  3.7 4.3   CHLORIDE 102  --  97 100   CO2 23  --  21 21   BUN 13  --  7* 18   CREATININE 0.55  --  0.40* 0.69   MAGNESIUM 2.1 1.8  --  2.2   CALCIUM 9.0  --  9.2 8.9     Recent Labs     01/31/23 2055 02/01/23 0600 02/02/23 0600 02/03/23 0352   ALTSGPT 30 28  --   --    ASTSGOT 42* 28  --   --    ALKPHOSPHAT 95 96  --   --    TBILIRUBIN 0.4 0.6  --   --    GLUCOSE 108* 125* 123* 116*     Recent Labs     02/01/23 0600 02/02/23 0350 02/03/23 0352   RBC 4.26 4.14* 3.92*   HEMOGLOBIN 13.7 13.1 12.5   HEMATOCRIT 39.9 38.9 37.7   PLATELETCT 339 345 313     Recent Labs     01/31/23 2055 02/01/23 0600 02/02/23 0350 02/03/23 0352   WBC  --  7.0 7.6 6.0   NEUTSPOLYS 55.4  --   --   --    LYMPHOCYTES 30.3  --   --   --    MONOCYTES 7.1  --   --   --    EOSINOPHILS 6.8*  --   --   --    BASOPHILS 0.4  --   --   --    ASTSGOT 42* 28  --   --    ALTSGPT 30 28  --   --    ALKPHOSPHAT 95 96  --   --    TBILIRUBIN 0.4 0.6  --   --        Meds:   sodium chloride  2 g      enoxaparin (LOVENOX) injection  40 mg      amLODIPine  10 mg      thiamine  100 mg Stopped (02/03/23 0642)    enalaprilat  1.25 mg      hydrALAZINE  10 mg      labetalol  10 mg      atorvastatin  10 mg      senna-docusate  2 Tablet      And    polyethylene glycol/lytes  1 Packet      And    magnesium hydroxide  30 mL      And    bisacodyl  10 mg      LORazepam  2 mg      acetaminophen  650 mg      Or    acetaminophen  650 mg      ondansetron  4 mg      Or    ondansetron  4 mg      MD Alert...Adult ICU  Electrolyte Replacement per Pharmacy          Imaging:  EC-ECHOCARDIOGRAM COMPLETE W/O CONT   Final Result      MR-BRAIN-WITH & W/O   Final Result         Acute-subacute hemorrhage in the left thalamus and adjacent parietal corona radiata with mass effect upon the posterior third ventricle and slight midline shift measuring 4 to 5 mm. Surrounding edema noted which extends to the cerebral peduncle on the    tectum.      No abnormal vascularity or abnormal enhancement is identified in the vicinity of the hemorrhage to suggest an underlying lesion.      Nonspecific T2 hyperintensities are noted in the periventricular and deep white matter, most likely related to chronic microvascular ischemia.      MR-MRA HEAD-W/O   Final Result      Normal intracranial MRA.          ASSESSEMENT and PLAN:    * Nontraumatic acute hemorrhage of basal ganglia (HCC)- (present on admission)  Assessment & Plan  Secondary to uncontrolled hypertension  Neurology consulted  Continue with very strict blood pressure management with goal SBP less than 140  Lovenox DVT prophylaxis monitoring closely  Q4 hour neurochecks  Seizure and aspiration precautions  PT/OT/SLP evaluation  Goal eunatremia (increased salt tabs today), euthermia, euvolemia (encourage oral hydration), euglycemia    Hypomagnesemia  Assessment & Plan  At goal today; monitor closely    Hypokalemia  Assessment & Plan  At goal today; continue to monitor    Dyslipidemia  Assessment & Plan  Continue with low-dose atorvastatin  RD consulted    Alcohol use  Assessment & Plan  Continue vitamin supplementation x 5 days  Monitor for alcohol withdrawal syndrome - early development 2/1  Eventual alcohol cessation education and resources to be provided when appropriate  Continue to titrate Precedex gtt, d/c librium to prevent clouding neuro exam  If further escalating withdrawal symptoms, consider gabapentin +/- clonidine in addition to dex gtt    Hypertensive emergency- (present on  admission)  Assessment & Plan  Resumed outpatient dose of telmisartan yesterday  BP goal SBP less than 140; ideally 110-140 systolic  Amlodipine 10 mg daily  IV as needed Vasotec, hydralazine, labetalol      Sarkis Camara M.D.

## 2023-02-03 NOTE — DOCUMENTATION QUERY
[facility]                                                                       Query Response Note      PATIENT:               NICHOLE SANTIAGO  ACCT #:                  [id number]  MRN:                     [id number]  :                      1965  ADMIT DATE:       2023 11:17 PM  DISCH DATE:          RESPONDING  PROVIDER #:        [id number]           QUERY TEXT:    Based on the diagnostic and clinical indicators documented below, can a diagnosis be made?    NOTE- If an appropriate response is not listed below, please respond with a new note.      The patient's clinical indicators include:   MRI -  Acute-subacute hemorrhage in the left thalamus and adjacent parietal corona radiata with mass effect upon the posterior third ventricle and slight midline shift measuring 4 to 5 mm. Surrounding edema noted which extends to the cerebral peduncle on the tectum.     Neurology consult - ok for normonatremia currently, if worsening edema / shift on next CT will add 3%    Treatment - Neurology consult; ICU admission; MRI brain; IV Hydralazine; PO salt tabs    Risk factors - Nontraumatic acute hemorrhage of basal ganglia, Hypertension    Thank you,  Lisa Bo BSN  Clinical   Connect via [employer] Messenger  Options provided:   -- Brain compression is ruled in   -- Cerebral edema is ruled in   -- Brain compression and cerebral edema are ruled in   -- Findings of no clinical significance   -- Other explanation, (please specify the other explanation)   -- Unable to determine      Query created by: Lisa Bo on 2/3/2023 2:30 PM    RESPONSE TEXT:    Brain compression and cerebral edema are ruled in          Electronically signed by:  GIAN RODRIGUEZ MD 2/3/2023 3:42 PM

## 2023-02-03 NOTE — PROGRESS NOTES
4 Eyes Skin Assessment Completed by DOT Briones and DOT Carvajal.    Head WDL  Ears WDL  Nose WDL  Mouth WDL  Neck WDL  Breast/Chest WDL  Shoulder Blades WDL  Spine WDL  (R) Arm/Elbow/Hand scratch  (L) Arm/Elbow/Hand WDL  Abdomen WDL  Groin WDL  Scrotum/Coccyx/Buttocks WDL  (R) Leg WDL  (L) Leg WDL  (R) Heel/Foot/Toe dry  (L) Heel/Foot/Toe dry          Devices In Places Blood Pressure Cuff, Pulse Ox, and SCD's      Interventions In Place Pillows and Pressure Redistribution Mattress    Possible Skin Injury No    Pictures Uploaded Into Epic N/A  Wound Consult Placed N/A  RN Wound Prevention Protocol Ordered No

## 2023-02-03 NOTE — PROGRESS NOTES
Patient assessed for CIWA with a score of 8. Patient refused taking ativan. This RN felt comfortable with patient refusing the ativan. Education provided to patient and family at bedside. Will continue with Q4hr CIWA checks.

## 2023-02-03 NOTE — PROGRESS NOTES
Report given to Anali CHRIS, updated on plan of care. Patient transferred with belongings and medication to room s183-1.

## 2023-02-03 NOTE — PROGRESS NOTES
Patient via ICU RN and tech. Patient oriented to room and call light. Skin check completed. Fall and aspiration precautions in place. Call light within reach. No further needs at this time.

## 2023-02-03 NOTE — PROGRESS NOTES
Home medication list reviewed with patient's daughter. Patient not currently taking any prescription medications. Allergies reviewed. Preferred pharmacy confirmed.     Ofelia Payne, Pharmacy Intern

## 2023-02-03 NOTE — PROGRESS NOTES
"Critical Care Progress Note    Date of admission  1/31/2023    Chief Complaint  57 y.o. female admitted 1/31/2023 with [facility]    Hospital Course  \"57 y.o. female with a history of HTN, EtOH use presented to [facility] with acute onset headache and right sided facial droop.  Found to have left BG/thalamic hemorrhage, started on nicardipine infusion.  Not anticoagulated.  Reportedly drinks 5-7 drinks daily, no other substance use known.  Family requested transfer from [facility] (see transfer center note and encounter note from TC).\" - Dr. Aguayo 1/31 2/1 - nicardipine gtt, MRI brain without AVM nor mass, ?early ETOH w/d  2/2 - remains on precedex gtt, increased anti-HTN, N/V    Interval Problem Update  Reviewed last 24 hour events:   - echo with intracardiac shunt   - remains on precedex gtt @ 0.2   - improving BP control, nicardipine gtt not needed   - AF, nl WBC   - NSR, -145   - confused at times, persistent R sided weakness but improving, NIHSS 10   - Na up to 131 on salt tabs   - low UOP overnight, increasing BUN/crt --> NS bolus   - day 3/5 IV thiamine    Review of Systems  Review of Systems   Constitutional:  Negative for chills, fever and malaise/fatigue.   HENT:  Negative for congestion and sore throat.    Eyes:  Negative for blurred vision.   Respiratory:  Negative for cough, sputum production and shortness of breath.    Cardiovascular:  Negative for chest pain, palpitations and leg swelling.   Gastrointestinal:  Positive for nausea. Negative for abdominal pain and vomiting.   Genitourinary:  Negative for dysuria.   Musculoskeletal:  Negative for back pain and myalgias.   Skin:  Negative for rash.   Neurological:  Positive for weakness and headaches. Negative for dizziness, sensory change and speech change.   Endo/Heme/Allergies:  Does not bruise/bleed easily.   Psychiatric/Behavioral:  Negative for depression. The patient is not nervous/anxious.    All other systems reviewed and are negative. "     Vital Signs for last 24 hours   Pulse:  [59-85] 62  Resp:  [11-33] 17  BP: (112-169)/(63-91) 125/76  SpO2:  [93 %-98 %] 96 %    Respiratory Information for the last 24 hours   2 lpm n/c    Physical Exam   Physical Exam  Vitals and nursing note reviewed.   Constitutional:       General: She is awake. She is not in acute distress.     Appearance: Normal appearance. She is well-developed.      Interventions: Nasal cannula in place.   HENT:      Head: Normocephalic and atraumatic.      Nose: Nose normal. No congestion.      Mouth/Throat:      Mouth: Mucous membranes are moist.      Pharynx: Oropharynx is clear.   Eyes:      General: No scleral icterus.     Extraocular Movements: Extraocular movements intact.      Pupils: Pupils are equal, round, and reactive to light.   Neck:      Vascular: No JVD.      Trachea: No tracheal deviation.   Cardiovascular:      Rate and Rhythm: Normal rate and regular rhythm. Occasional Extrasystoles are present.     Chest Wall: PMI is displaced.      Pulses: Normal pulses.      Heart sounds: Normal heart sounds. No murmur heard.     Comments: Improving HTN  Pulmonary:      Effort: Pulmonary effort is normal. No accessory muscle usage or respiratory distress.      Breath sounds: Normal breath sounds. No stridor. No wheezing or rales.      Comments: Speaking in full sentences without difficulty  Abdominal:      General: Bowel sounds are normal. There is no distension.      Palpations: Abdomen is soft.      Tenderness: There is no abdominal tenderness. There is no guarding or rebound.   Musculoskeletal:         General: No tenderness.      Cervical back: Neck supple. No rigidity or tenderness.      Right lower leg: No edema.      Left lower leg: No edema.   Skin:     General: Skin is warm and dry.      Capillary Refill: Capillary refill takes less than 2 seconds.      Findings: No rash.   Neurological:      Mental Status: She is alert.      Comments: Unchanged right-sided weakness and  facial droop with mild dysarthria, oriented x2-3 and left side of body intact   Psychiatric:         Mood and Affect: Mood normal.         Behavior: Behavior is cooperative.       Medications  Current Facility-Administered Medications   Medication Dose Route Frequency Provider Last Rate Last Admin    dexmedetomidine (PRECEDEX) 400 mcg/100mL NS premix infusion  0.1-1.5 mcg/kg/hr (Ideal) Intravenous Continuous Tracy LJaclyn Latona 4.8 mL/hr at 02/03/23 0239 0.3 mcg/kg/hr at 02/03/23 0239    sodium chloride (SALT) tablet 1 g  1 g Oral TID WITH MEALS Jeremy M Gonda, M.D.   1 g at 02/03/23 0632    niCARdipine (CARDENE) 25 mg in  mL Standard Infusion  0-15 mg/hr Intravenous Continuous Jeremy M Gonda, M.D.   Dose not Required at 02/02/23 0930    enoxaparin (Lovenox) inj 40 mg  40 mg Subcutaneous DAILY AT 1800 Sarkis Camara M.D.   40 mg at 02/02/23 1737    amLODIPine (NORVASC) tablet 10 mg  10 mg Oral Q DAY Jeremy M Gonda, M.D.   10 mg at 02/03/23 0632    thiamine (B-1) IVPB 100 mg in 100 mL D5W (premix)  100 mg Intravenous DAILY Domingo Aguayo M.D. 200 mL/hr at 02/03/23 0612 100 mg at 02/03/23 0612    enalaprilat (Vasotec) injection 1.25 mg 1 mL  1.25 mg Intravenous Q6HRS PRN Domingo Aguayo M.D.   1.25 mg at 02/02/23 0855    hydrALAZINE (APRESOLINE) injection 10 mg  10 mg Intravenous Q4HRS PRN Domingo Aguayo M.D.   10 mg at 02/02/23 1538    labetalol (NORMODYNE/TRANDATE) injection 10 mg  10 mg Intravenous Q4HRS PRN Domingo Aguayo M.D.   10 mg at 02/02/23 0630    atorvastatin (LIPITOR) tablet 10 mg  10 mg Oral Q EVENING Sarkis Camara M.D.   10 mg at 02/02/23 1806    senna-docusate (PERICOLACE or SENOKOT S) 8.6-50 MG per tablet 2 Tablet  2 Tablet Oral BID oDmingo Aguayo M.D.   2 Tablet at 02/03/23 0632    And    polyethylene glycol/lytes (MIRALAX) PACKET 1 Packet  1 Packet Oral QDAY PRN Domingo Aguayo M.D.        And    magnesium hydroxide (MILK OF MAGNESIA) suspension 30 mL  30 mL Oral QDAY PRN Domingo Aguayo,  M.D.        And    bisacodyl (DULCOLAX) suppository 10 mg  10 mg Rectal QDAY PRN Domingo Aguayo M.D.        LORazepam (ATIVAN) injection 2 mg  2 mg Intravenous Q5 MIN PRN Domingo Aguayo M.D.        acetaminophen (Tylenol) tablet 650 mg  650 mg Oral Q4HRS PRN Domingo Aguayo M.D.   650 mg at 02/01/23 1721    Or    acetaminophen (TYLENOL) suppository 650 mg  650 mg Rectal Q4HRS PRN Domingo Aguayo M.D.        ondansetron (ZOFRAN ODT) dispertab 4 mg  4 mg Oral Q4HRS PRN Domingo Aguayo M.D.        Or    ondansetron (ZOFRAN) syringe/vial injection 4 mg  4 mg Intravenous Q4HRS PRN Domingo Aguayo M.D.   4 mg at 02/02/23 1630    MD Alert...ICU Electrolyte Replacement per Pharmacy   Other PHARMACY TO DOSE Domingo Aguayo M.D.           Fluids    Intake/Output Summary (Last 24 hours) at 2/3/2023 0635  Last data filed at 2/3/2023 0600  Gross per 24 hour   Intake 728.18 ml   Output 865 ml   Net -136.82 ml         Laboratory          Recent Labs     02/01/23 0600 02/02/23 0350 02/02/23 0600 02/03/23 0352   SODIUM 137  --  130* 131*   POTASSIUM 3.3*  --  3.7 4.3   CHLORIDE 102  --  97 100   CO2 23  --  21 21   BUN 13  --  7* 18   CREATININE 0.55  --  0.40* 0.69   MAGNESIUM 2.1 1.8  --  2.2   CALCIUM 9.0  --  9.2 8.9       Recent Labs     01/31/23 2055 02/01/23 0600 02/02/23 0600 02/03/23 0352   ALTSGPT 30 28  --   --    ASTSGOT 42* 28  --   --    ALKPHOSPHAT 95 96  --   --    TBILIRUBIN 0.4 0.6  --   --    GLUCOSE 108* 125* 123* 116*       Recent Labs     01/31/23 2055 02/01/23 0600 02/02/23 0350 02/03/23 0352   WBC  --  7.0 7.6 6.0   NEUTSPOLYS 55.4  --   --   --    LYMPHOCYTES 30.3  --   --   --    MONOCYTES 7.1  --   --   --    EOSINOPHILS 6.8*  --   --   --    BASOPHILS 0.4  --   --   --    ASTSGOT 42* 28  --   --    ALTSGPT 30 28  --   --    ALKPHOSPHAT 95 96  --   --    TBILIRUBIN 0.4 0.6  --   --        Recent Labs     02/01/23  0600 02/02/23  0350 02/03/23  0352   RBC 4.26 4.14* 3.92*   HEMOGLOBIN 13.7 13.1 12.5    HEMATOCRIT 39.9 38.9 37.7   PLATELETCT 339 345 313         Imaging  No imaging today    Assessment/Plan  * Nontraumatic acute hemorrhage of basal ganglia (HCC)- (present on admission)  Assessment & Plan  Secondary to uncontrolled hypertension  Neurology consulted  Continue with very strict blood pressure management with goal SBP less than 140  Lovenox DVT prophylaxis begin 2/2  Q4 hour neurochecks  Seizure and aspiration precautions  PT/OT/SLP evaluation  Goal eunatremia (increased salt tabs again today), euthermia, euvolemia (encourage oral hydration, IV fluid bolus), euglycemia    Hypertensive emergency- (present on admission)  Assessment & Plan  BP goal SBP less than 140  Continue current dose of amlodipine 10 mg daily  IV as needed Vasotec, hydralazine, labetalol    Dyslipidemia  Assessment & Plan  Continue with low-dose atorvastatin  RD consulted    Alcohol use  Assessment & Plan  Continue vitamin supplementation x 5 days  Monitor for alcohol withdrawal syndrome - early development 2/1, improving  Eventual alcohol cessation education and resources provided  Discontinue Precedex gtt this morning    Hypomagnesemia  Assessment & Plan  Repleted    Hypokalemia  Assessment & Plan  Repleted         VTE:  Lovenox  Ulcer: Not Indicated  Lines: Braga Catheter  to be removed today    I have performed a physical exam and reviewed and updated ROS and Plan today (2/3/2023). In review of yesterday's note (2/2/2023), there are no changes except as documented above.     Discussed patient condition and risk of morbidity and/or mortality with Hospitalist, RN, RT, Pharmacy, [employer] [facility] resident, Charge nurse / hot rounds, Patient, and neurology.  Critical care services will sign off at this time.  Please call with any questions or concerns.    Please note that this dictation was created using voice recognition software. I have made every reasonable attempt to correct obvious errors, but there may be errors of grammar and possibly  content that I did not discover before finalizing the note.

## 2023-02-03 NOTE — THERAPY
"Physical Therapy   Daily Treatment     Patient Name: Anali Pan  Age:  57 y.o., Sex:  female  Medical Record #: [id number]  Today's Date: 2/3/2023     Precautions  Precautions: Fall Risk;Swallow Precautions ( See Comments)  Comments: SBP < 140 goal    Assessment    Pt progressing as expected given diagnosis. Pt with improved tolerance for EOB, progressed to STS and taking 1 side step toward R with VC/TC cues for sequencing, initiation, weight shifting and calming presence given anxiety. Pt motivated to improve and receptive to education. Pt presents with impaired cognition, balance, motor planning/sequencing/initiation, coordination, R>LLE weakness, decreased activity tolerance and endurance. Pt noted to have fractionated movement at each joint in RLE and RUE, however, with cues for multi joint tasks pt noted to have impaired control over movements. Recommend placement. Will continue to follow.     Plan    Physical Therapy Treatment Plan  Physical Therapy Treatment Plan: Continue Current Treatment Plan    DC Equipment Recommendations: Unable to determine at this time  Discharge Recommendations: Recommend post-acute placement for additional physical therapy services prior to discharge home      Subjective  \" I don't want to be a limp lady.\"     Objective     02/03/23 1147   Vitals   O2 (LPM) 2   O2 Delivery Device Silicone Nasal Cannula   Vitals Comments BP within parameters throughout session   Pain 0 - 10 Group   Therapist Pain Assessment Nurse Notified;0   Cognition    Cognition / Consciousness X   Speech/ Communication Delayed Responses;Word Finding Impairment;Dysarthric;Expressive Aphasia   Level of Consciousness Alert   Ability To Follow Commands 1 Step   Safety Awareness Impaired   Attention Impaired   Sequencing Impaired   Initiation Impaired   Comments Pleasant and cooperative. Improved speech, however, still word finding difficulty. Difficulty sequencing tasks, however, reports motivated to get better " and attentive to education   Passive ROM Lower Body   Passive ROM Lower Body WDL   Active ROM Lower Body    Active ROM Lower Body  X   Comments Intact BLE AROM, however, impaired by motor planning/sequencing   Strength Lower Body   Lower Body Strength  X   Comments LLE 4/5, RLE 3+/5.   Sensation Lower Body   Lower Extremity Sensation   X   Comments LLE WDL to pain and light touch, however, RLE absent light touch and impaired to pain. Could feel deep pressure BLE however   Lower Body Muscle Tone   Lower Body Muscle Tone  X   Rt Lower Extremity Muscle Tone Hypertonic;Fluctuating   Comments Continued R ankle inversion posturing, RUE flexor posturing   Neuro-Muscular Treatments   Neuro-Muscular Treatments Anterior weight shift;Weight Shift Right;Weight Shift Left;Verbal Cuing;Tactile Cuing;Tapping;Sequencing;Postural Facilitation;Joint Approximation;Facilitation;Compensatory Strategies   Comments Initially requiring [place] at EOB for balance with LUE support 2/2 R lean, however improved to CGA. Difficulty with weight shifting L/R in seated without falling over. In standing, requiring facilitation to advanced RLE and weight shift toward L depsite pt attempts.   Vision   Vision Comments Pt wearing her glasses this session   Other Treatments   Other Treatments Provided therapeutic listening and breathing techniques provided throughout. Pt with increased anxiety with attempting new mobility (further EOB, scooting, standing) and could not tolerate taking a BP at EOB. Redirectable when calm environment provided. 2 daughters also present providing positive reinforcement.   Neurological Concerns   Neurological Concerns Yes   Sitting Posture During ADL's Pushes to the Right;Lateral Lean Right   Balance   Sitting Balance (Static) Fair -   Sitting Balance (Dynamic) Poor +   Standing Balance (Static) Trace +   Standing Balance (Dynamic) Trace   Weight Shift Sitting Poor   Weight Shift Standing Poor   Skilled Intervention Verbal  Cuing;Tactile Cuing;Sequencing;Postural Facilitation;Compensatory Strategies   Comments via B HHA/contact assist   Bed Mobility    Supine to Sit Moderate Assist   Sit to Supine Moderate Assist   Scooting Moderate Assist   Rolling Moderate Assist to Lt.   Skilled Intervention Verbal Cuing;Tactile Cuing;Sequencing;Postural Facilitation;Compensatory Strategies   Comments x2 persons for safety   Gait Analysis   Gait Level Of Assist Unable to Participate   Comments able to take 1 step toward R with ModAx2 and assist from therapist to advance RLE. Noted R ankle inversion/DF   Functional Mobility   Sit to Stand Minimal Assist  (x2 person)   Bed, Chair, Wheelchair Transfer Unable to Participate  (deferred given pt's anxiety with standing)   Mobility STS x2, return to supine   Skilled Intervention Verbal Cuing;Tactile Cuing;Sequencing;Compensatory Strategies;Postural Facilitation   ICU Target Mobility Level   ICU Mobility - Targeted Level Level 3A   How much difficulty does the patient currently have...   Turning over in bed (including adjusting bedclothes, sheets and blankets)? 1   Sitting down on and standing up from a chair with arms (e.g., wheelchair, bedside commode, etc.) 1   Moving from lying on back to sitting on the side of the bed? 1   How much help from another person does the patient currently need...   Moving to and from a bed to a chair (including a wheelchair)? 2   Need to walk in a hospital room? 2   Climbing 3-5 steps with a railing? 1   6 clicks Mobility Score 8   Activity Tolerance   Sitting in Chair NT   Sitting Edge of Bed 5-8 min   Standing 2 min   Short Term Goals    Short Term Goal # 1 Pt will transition from supine to EOB w/ [place] in 6 visits to improve independence in bed mobility   Goal Outcome # 1 Progressing as expected   Short Term Goal # 2 Pt will demonstrate fair- balance at EOB w/ BUE support w/ SPV in 6 visits   Goal Outcome # 2 Goal met   Short Term Goal # 3 Pt will transfer from EOB to  chair w/ [place] in 6 visits to improve OOB mobility   Goal Outcome # 3 Progressing as expected   Short Term Goal # 4 Pt will ambulate 150 ft w/ LRAD w/ SPV in 6 visits to access household distances   Goal Outcome # 4 Goal not met   Short Term Goal # 5 Pt will negotiate 1 step w/ [place] in 6 visits to access household   Goal Outcome # 5 Goal not met   Education Group   Education Provided Role of Physical Therapist;Cerebral Vascular Accident   Role of Physical Therapist Patient Response Patient;Family;Acceptance;Explanation;Verbal Demonstration   CVA Patient Response Patient;Family;Acceptance;Explanation;Verbal Demonstration   Additional Comments Educated pt and family on focus on RUE/LE control with movements, continued OOB mobility with RN staff as able   Physical Therapy Treatment Plan   Physical Therapy Treatment Plan Continue Current Treatment Plan   Anticipated Discharge Equipment and Recommendations   DC Equipment Recommendations Unable to determine at this time   Discharge Recommendations Recommend post-acute placement for additional physical therapy services prior to discharge home   Interdisciplinary Plan of Care Collaboration   IDT Collaboration with  Nursing;Family / Caregiver;Physician   Patient Position at End of Therapy In Bed;Call Light within Reach;Phone within Reach;Family / Friend in Room   Collaboration Comments RN updated   Session Information   Date / Session Number  2/3- 2 (2/4, 2/8)

## 2023-02-03 NOTE — ASSESSMENT & PLAN NOTE
Monitoring with labs daily, fluid restriction  Patient is on salt tablets by intensivist  The goal 135-145

## 2023-02-03 NOTE — THERAPY
"Physical Therapy   Initial Evaluation     Patient Name: Anali Pan  Age:  57 y.o., Sex:  female  Medical Record #: [id number]  Today's Date: 2/2/2023     Precautions  Precautions: Fall Risk;Swallow Precautions ( See Comments)  Comments: BP < 140    Assessment    Pt is a 56 y/o female who presented to the hospital w/ HA, R facial droop, and R-sided weakness. 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 PMHx includes HTN, EtOH abuse (current withdrawal symptoms). Pt presents w/ impaired mobility, balance, coordination, motor planning/sequencing, decreased activity tolerance, decreased functional B UE and LE strength, and R UE and LE flexor synergy at EOB. Pt mobilized as described below. Recommend post-acute placement upon d/c. Will continue to follow for IP acute therapy to address above functional deficits.    Plan    Physical Therapy Initial Treatment Plan   Treatment Plan : Bed Mobility, Equipment, Gait Training, Neuro Re-Education / Balance, Self Care / Home Evaluation, Stair Training, Therapeutic Activities, Therapeutic Exercise  Treatment Frequency: 4 Times per Week  Duration: Until Therapy Goals Met    DC Equipment Recommendations: Unable to determine at this time  Discharge Recommendations: Recommend post-acute placement for additional physical therapy services prior to discharge home       Subjective    \"I feel so cold\"     Objective       02/02/23 1437   Cosignature   Documentation Review Approved with modifications made by preceptor in flowsheet   Initial Contact Note    Initial Contact Note Order Received and Verified, Physical Therapy Evaluation in Progress with Full Report to Follow.   Precautions   Precautions Fall Risk;Swallow Precautions ( See Comments)   Comments BP < 140   Vitals   Pulse 78  (78 supine, 81 EOB)   Blood Pressure (!) 141/86  (141/86 supine > 144/90 EOB)   Respiration (!) 33  (33 supine, 48 EOB)   Pulse Oximetry 98 %  (98 supine > " 92 EOB)   O2 (LPM) 2   O2 Delivery Device Silicone Nasal Cannula   Vitals Comments new O2 needs; vitals stable throughout session   Pain 0 - 10 Group   Therapist Pain Assessment Nurse Notified;0  (pt reported no pain during session)   Prior Living Situation   Prior Services Home-Independent   Housing / Facility 1 Story House   Steps Into Home 1   Steps In Home 0   Rail None   Bathroom Set up Walk In Shower   Equipment Owned Single Point Cane;Front-Wheel Walker;Bed Side Commode  (Pt's mom in room and states she has access to this equipment to give to daugther if needed)   Lives with - Patient's Self Care Capacity Alone and Able to Care For Self   Comments Pt's boyfriend and mother in room to assist w/ history as pt still considered a poor historian due to poor communication/cognition. Pt's boyfriend lives down the street and is able to take care of her at his house if needed (same home set up). He works full time, however, some days he works from home and is able to help. Pt's mom also reports living nearby and is a full-time caregiver for her  w/ dementia, however, she is able to leave her  in her friends/neighbor's care to help w/ her daughter if needed during the day. Pt works 1 day a week as a . Per EMR, pt has 2 daughter that live in the area.   Prior Level of Functional Mobility   Bed Mobility Independent   Transfer Status Independent   Ambulation Independent   Distance Ambulation (Feet)   (community)   Assistive Devices Used None   Stairs Independent   Comments pt's boyfriend reports she was an avid hiker prior to admisison   Cognition    Cognition / Consciousness X   Speech/ Communication Delayed Responses;Word Finding Impairment;Dysarthric;Expressive Aphasia   Level of Consciousness Alert   Ability To Follow Commands 1 Step   Safety Awareness Impaired   New Learning Impaired   Attention Impaired   Sequencing Impaired   Initiation Impaired   Comments Pleasant and cooperative. Pt gave  accurate history answers for >75% of questions, but needed to be corrected by boyfriend/mom at times. Pt demonstrated increasingly anxious behavior at EOB and was returned to supine for safety. Per RN, pt is entering alcohol withdrawal as of this morning   Active ROM Upper Body   Active ROM Upper Body  X   Dominant Hand Right   Comments Per OT: Difficult to formally assess due to impaired ability to focus and anxious behavior, however RUE flexor synergy pattern noted while EOB, LUE appeared to WFL   Strength Upper Body   Comments unable to assess 2/2 to poor command following. Pt demonstrated pulling to sit in bed w/ L arm hooked under L LE. LUE grossly 4-/5. RUE difficult to assess   Sensation Upper Body   Upper Extremity Sensation  Not Tested   Comments unable to assess 2/2 to increased anxiousness and poor command following   Upper Body Muscle Tone   Upper Body Muscle Tone  X   Rt Upper Extremity Muscle Tone Hypertonic;Fluctuating   Active ROM Lower Body    Active ROM Lower Body  X   Comments noted initiating bringing BLE to EOB, however, ultimately required assistance.   Strength Lower Body   Lower Body Strength  X   Comments unable to formally assess 2/2 to poor command following and anxiousness. Pt demonstrated L hip flexion, knee flexion and ankle DF against gravity, at minimum L LE expected to be grossly 3+/5. R LE difficult to assess.   Sensation Lower Body   Lower Extremity Sensation   Not Tested   Comments unable to assess 2/2 to poor command following and anxiousness   Lower Body Muscle Tone   Lower Body Muscle Tone  X   Rt Lower Extremity Muscle Tone Hypertonic;Fluctuating   Comments Noted ankle inversion posturing, RLE flexion posturing at EOB   Neurological Concerns   Neurological Concerns Yes   Sitting Posture During ADL's Pushes to the Right   Coordination Lower Body    Coordination Lower Body  Not Tested   Comments unable to assess 2/2 to poor command following. Anticipate impairement RLE   Other  Treatments   Other Treatments Provided therapeutic listening and breathing techniques provided to pt to calm at EOB and after return to supine   Balance Assessment   Sitting Balance (Static) Trace +   Sitting Balance (Dynamic) Trace   Weight Shift Sitting Poor   Comments Pushed to R when therapist instructed to lean L   Bed Mobility    Supine to Sit Total Assist   Sit to Supine Total Assist   Scooting Maximal Assist   Comments x2 for line management and safety   Gait Analysis   Gait Level Of Assist Unable to Participate   Comments deferred 2/2 to poor command following, trace EOB balance, and anxiousness   Functional Mobility   Sit to Stand Unable to Participate   Bed, Chair, Wheelchair Transfer Unable to Participate   Mobility supine > EOB > return to supine   Comments deferred 2/2 to poor command following, trace EOB balance, and anxiousness   ICU Target Mobility Level   ICU Mobility - Targeted Level Level 2   How much difficulty does the patient currently have...   Turning over in bed (including adjusting bedclothes, sheets and blankets)? 1   Sitting down on and standing up from a chair with arms (e.g., wheelchair, bedside commode, etc.) 1   Moving from lying on back to sitting on the side of the bed? 1   How much help from another person does the patient currently need...   Moving to and from a bed to a chair (including a wheelchair)? 1   Need to walk in a hospital room? 1   Climbing 3-5 steps with a railing? 1   6 clicks Mobility Score 6   Activity Tolerance   Sitting Edge of Bed 8 min   Comments pt anxiety increased at EOB, began demonstrating R UE and R LE flexor synergy and spastic movement of R UE. Pt reported being cold and began shaking. Pt warm to the touch   Short Term Goals    Short Term Goal # 1 Pt will transition from supine to EOB w/ [place] in 6 visits to improve independence in bed mobility   Short Term Goal # 2 Pt will demonstrate fair- balance at EOB w/ BUE support w/ SPV in 6 visits   Short Term  Goal # 3 Pt will transfer from EOB to chair w/ [place] in 6 visits to improve OOB mobility   Short Term Goal # 4 Pt will ambulate 150 ft w/ LRAD w/ SPV in 6 visits to access household distances   Short Term Goal # 5 Pt will negotiate 1 step w/ [place] in 6 visits to access household   Education Group   Education Provided Role of Physical Therapist   Role of Physical Therapist Patient Response Patient;Acceptance;Explanation;Verbal Demonstration;Family   Physical Therapy Initial Treatment Plan    Treatment Plan  Bed Mobility;Equipment;Gait Training;Neuro Re-Education / Balance;Self Care / Home Evaluation;Stair Training;Therapeutic Activities;Therapeutic Exercise   Treatment Frequency 4 Times per Week   Duration Until Therapy Goals Met   Problem List    Problems Impaired Bed Mobility;Impaired Transfers;Impaired Ambulation;Functional ROM Deficit;Functional Strength Deficit;Impaired Balance;Impaired Coordination;Decreased Activity Tolerance;Safety Awareness Deficits / Cognition;Motor Planning / Sequencing   Anticipated Discharge Equipment and Recommendations   DC Equipment Recommendations Unable to determine at this time   Discharge Recommendations Recommend post-acute placement for additional physical therapy services prior to discharge home   Interdisciplinary Plan of Care Collaboration   IDT Collaboration with  Nursing;Family / Caregiver;Occupational Therapist   Patient Position at End of Therapy In Bed;Family / Friend in Room;Phone within Reach;Tray Table within Reach;Call Light within Reach   Collaboration Comments RN updated   Session Information   Date / Session Number  2/2 - 1 (1/4, 2/8)

## 2023-02-03 NOTE — DISCHARGE PLANNING
Physiatry to consult.      [id number]-Dr. Marcum is recommending [facility].  Spoke briefly with Elena, Mother.  She referred me to Missy, daughter as she has been handling her Mother.  Spoke with Missy, daughter regarding [facility] & D/C resources/support.  She is agreeable with an admission.  Anali will return to her 1LV home with 1ST to enter.  Missy works P/T as a  and her hours are flexible.  Briseida, daughter is a Nurse and works 3 12 hour shifts.  Otoniel, son works F/T days.  S.O. and Mother live nearby and are able to provide intermittent assist.  24/7 physical assistance will be provided by the 3 kids.  We reviewed the current [place] notes and Missy is aware of the physical need that will be required upon going home.  They will attend family training on a daily basis.

## 2023-02-03 NOTE — CARE PLAN
The patient is Stable - Low risk of patient condition declining or worsening    Shift Goals  Clinical Goals: Wean off Precedex  Patient Goals: Eat  Family Goals: Updates    Progress made toward(s) clinical / shift goals:    Problem: Knowledge Deficit - Standard  Goal: Patient and family/care givers will demonstrate understanding of plan of care, disease process/condition, diagnostic tests and medications  Outcome: Progressing     Problem: Fall Risk  Goal: Patient will remain free from falls  Outcome: Progressing     Problem: Optimal Care of the Stroke Patient  Goal: Optimal emergency care for the stroke patient  Outcome: Progressing     Problem: Knowledge Deficit - Stroke Education  Goal: Patient's knowledge of stroke and risk factors will improve  Outcome: Progressing     Problem: Discharge Planning - Stroke  Goal: Ensure Stroke Core Measures are met prior to discharge  Outcome: Progressing     Problem: Neuro Status  Goal: Neuro status will remain stable or improve  Outcome: Progressing     Problem: Hemodynamic Monitoring  Goal: Patient's hemodynamics, fluid balance and neurologic status will be stable or improve  Outcome: Progressing     Problem: Respiratory - Stroke Patient  Goal: Patient will achieve/maintain optimum respiratory rate/effort  Outcome: Progressing     Problem: Mobility - Stroke  Goal: Patient's capacity to carry out activities will improve  Outcome: Progressing     Problem: Pain - Standard  Goal: Alleviation of pain or a reduction in pain to the patient’s comfort goal  Outcome: Progressing     Problem: Optimal Care for Alcohol Withdrawal  Goal: Optimal Care for the alcohol withdrawal patient  Outcome: Progressing

## 2023-02-03 NOTE — CARE PLAN
The patient is Watcher - Medium risk of patient condition declining or worsening    Shift Goals  Clinical Goals: -140, stable neuro exam, improved urine output  Patient Goals: nausea control, rest  Family Goals: updates, rest    Progress made toward(s) clinical / shift goals:    Problem: Knowledge Deficit - Standard  Goal: Patient and family/care givers will demonstrate understanding of plan of care, disease process/condition, diagnostic tests and medications  Outcome: Progressing     Problem: Fall Risk  Goal: Patient will remain free from falls  Outcome: Progressing     Problem: Optimal Care of the Stroke Patient  Goal: Optimal emergency care for the stroke patient  Outcome: Progressing     Problem: Knowledge Deficit - Stroke Education  Goal: Patient's knowledge of stroke and risk factors will improve  Outcome: Progressing     Problem: Psychosocial - Patient Condition  Goal: Patient's ability to verbalize feelings about condition will improve  Outcome: Progressing  Goal: Patient's ability to re-evaluate and adapt role responsibilities will improve  Outcome: Progressing     Problem: Neuro Status  Goal: Neuro status will remain stable or improve  Outcome: Progressing     Problem: Hemodynamic Monitoring  Goal: Patient's hemodynamics, fluid balance and neurologic status will be stable or improve  Outcome: Progressing     Problem: Respiratory - Stroke Patient  Goal: Patient will achieve/maintain optimum respiratory rate/effort  Outcome: Progressing     Problem: Risk for Aspiration  Goal: Patient's risk for aspiration will be absent or decrease  Outcome: Progressing     Problem: Urinary Elimination  Goal: Establish and maintain regular urinary output  Outcome: Progressing     Problem: Bowel Elimination  Goal: Establish and maintain regular bowel function  Outcome: Progressing     Problem: Mobility - Stroke  Goal: Patient's capacity to carry out activities will improve  Outcome: Progressing     Problem: Self  Care  Goal: Patient will have the ability to perform ADLs independently or with assistance (bathe, groom, dress, toilet and feed)  Outcome: Progressing

## 2023-02-04 LAB
ANION GAP SERPL CALC-SCNC: 11 MMOL/L (ref 7–16)
BUN SERPL-MCNC: 16 MG/DL (ref 8–22)
CALCIUM SERPL-MCNC: 9.1 MG/DL (ref 8.5–10.5)
CHLORIDE SERPL-SCNC: 101 MMOL/L (ref 96–112)
CO2 SERPL-SCNC: 20 MMOL/L (ref 20–33)
CREAT SERPL-MCNC: 0.49 MG/DL (ref 0.5–1.4)
ERYTHROCYTE [DISTWIDTH] IN BLOOD BY AUTOMATED COUNT: 43.8 FL (ref 35.9–50)
GFR SERPLBLD CREATININE-BSD FMLA CKD-EPI: 110 ML/MIN/1.73 M 2
GLUCOSE SERPL-MCNC: 122 MG/DL (ref 65–99)
HCT VFR BLD AUTO: 39.2 % (ref 37–47)
HGB BLD-MCNC: 13.1 G/DL (ref 12–16)
MAGNESIUM SERPL-MCNC: 2 MG/DL (ref 1.5–2.5)
MCH RBC QN AUTO: 31.9 PG (ref 27–33)
MCHC RBC AUTO-ENTMCNC: 33.4 G/DL (ref 33.6–35)
MCV RBC AUTO: 95.4 FL (ref 81.4–97.8)
PLATELET # BLD AUTO: 340 K/UL (ref 164–446)
PMV BLD AUTO: 9.3 FL (ref 9–12.9)
POTASSIUM SERPL-SCNC: 3.6 MMOL/L (ref 3.6–5.5)
RBC # BLD AUTO: 4.11 M/UL (ref 4.2–5.4)
SODIUM SERPL-SCNC: 132 MMOL/L (ref 135–145)
WBC # BLD AUTO: 5.7 K/UL (ref 4.8–10.8)

## 2023-02-04 PROCEDURE — 770001 HCHG ROOM/CARE - MED/SURG/GYN PRIV*

## 2023-02-04 PROCEDURE — 700102 HCHG RX REV CODE 250 W/ 637 OVERRIDE(OP): Performed by: INTERNAL MEDICINE

## 2023-02-04 PROCEDURE — 700102 HCHG RX REV CODE 250 W/ 637 OVERRIDE(OP): Performed by: STUDENT IN AN ORGANIZED HEALTH CARE EDUCATION/TRAINING PROGRAM

## 2023-02-04 PROCEDURE — A9270 NON-COVERED ITEM OR SERVICE: HCPCS | Performed by: INTERNAL MEDICINE

## 2023-02-04 PROCEDURE — 80048 BASIC METABOLIC PNL TOTAL CA: CPT

## 2023-02-04 PROCEDURE — 99232 SBSQ HOSP IP/OBS MODERATE 35: CPT | Performed by: STUDENT IN AN ORGANIZED HEALTH CARE EDUCATION/TRAINING PROGRAM

## 2023-02-04 PROCEDURE — 700111 HCHG RX REV CODE 636 W/ 250 OVERRIDE (IP): Performed by: STUDENT IN AN ORGANIZED HEALTH CARE EDUCATION/TRAINING PROGRAM

## 2023-02-04 PROCEDURE — A9270 NON-COVERED ITEM OR SERVICE: HCPCS | Performed by: STUDENT IN AN ORGANIZED HEALTH CARE EDUCATION/TRAINING PROGRAM

## 2023-02-04 PROCEDURE — 36415 COLL VENOUS BLD VENIPUNCTURE: CPT

## 2023-02-04 PROCEDURE — 85027 COMPLETE CBC AUTOMATED: CPT

## 2023-02-04 PROCEDURE — 83735 ASSAY OF MAGNESIUM: CPT

## 2023-02-04 PROCEDURE — 700105 HCHG RX REV CODE 258: Performed by: STUDENT IN AN ORGANIZED HEALTH CARE EDUCATION/TRAINING PROGRAM

## 2023-02-04 RX ORDER — LOSARTAN POTASSIUM 50 MG/1
25 TABLET ORAL
Status: DISCONTINUED | OUTPATIENT
Start: 2023-02-04 | End: 2023-02-05

## 2023-02-04 RX ADMIN — THIAMINE HYDROCHLORIDE 100 MG: 100 INJECTION, SOLUTION INTRAMUSCULAR; INTRAVENOUS at 04:19

## 2023-02-04 RX ADMIN — LORAZEPAM 0.5 MG: 1 TABLET ORAL at 21:22

## 2023-02-04 RX ADMIN — ATORVASTATIN CALCIUM 10 MG: 10 TABLET, FILM COATED ORAL at 17:31

## 2023-02-04 RX ADMIN — AMLODIPINE BESYLATE 10 MG: 10 TABLET ORAL at 04:24

## 2023-02-04 RX ADMIN — SENNOSIDES AND DOCUSATE SODIUM 2 TABLET: 50; 8.6 TABLET ORAL at 17:31

## 2023-02-04 RX ADMIN — SODIUM CHLORIDE 2 G: 1 TABLET ORAL at 13:27

## 2023-02-04 RX ADMIN — ENALAPRILAT 1.25 MG: 1.25 INJECTION INTRAVENOUS at 23:09

## 2023-02-04 RX ADMIN — LABETALOL HYDROCHLORIDE 10 MG: 5 INJECTION, SOLUTION INTRAVENOUS at 20:09

## 2023-02-04 RX ADMIN — LOSARTAN POTASSIUM 25 MG: 50 TABLET, FILM COATED ORAL at 13:27

## 2023-02-04 RX ADMIN — SODIUM CHLORIDE 2 G: 1 TABLET ORAL at 17:31

## 2023-02-04 RX ADMIN — SENNOSIDES AND DOCUSATE SODIUM 2 TABLET: 50; 8.6 TABLET ORAL at 04:23

## 2023-02-04 RX ADMIN — HYDRALAZINE HYDROCHLORIDE 10 MG: 20 INJECTION INTRAMUSCULAR; INTRAVENOUS at 21:23

## 2023-02-04 RX ADMIN — SODIUM CHLORIDE 2 G: 1 TABLET ORAL at 09:23

## 2023-02-04 RX ADMIN — ENOXAPARIN SODIUM 40 MG: 40 INJECTION SUBCUTANEOUS at 17:31

## 2023-02-04 RX ADMIN — HYDRALAZINE HYDROCHLORIDE 10 MG: 20 INJECTION INTRAMUSCULAR; INTRAVENOUS at 03:50

## 2023-02-04 ASSESSMENT — LIFESTYLE VARIABLES
AUDITORY DISTURBANCES: NOT PRESENT
HEADACHE, FULLNESS IN HEAD: NOT PRESENT
HEADACHE, FULLNESS IN HEAD: NOT PRESENT
NAUSEA AND VOMITING: NO NAUSEA AND NO VOMITING
PAROXYSMAL SWEATS: NO SWEAT VISIBLE
TREMOR: NO TREMOR
PAROXYSMAL SWEATS: NO SWEAT VISIBLE
HEADACHE, FULLNESS IN HEAD: VERY MILD
ORIENTATION AND CLOUDING OF SENSORIUM: ORIENTED AND CAN DO SERIAL ADDITIONS
TREMOR: TREMOR NOT VISIBLE BUT CAN BE FELT, FINGERTIP TO FINGERTIP
AUDITORY DISTURBANCES: NOT PRESENT
VISUAL DISTURBANCES: NOT PRESENT
ORIENTATION AND CLOUDING OF SENSORIUM: ORIENTED AND CAN DO SERIAL ADDITIONS
TOTAL SCORE: 3
PAROXYSMAL SWEATS: BARELY PERCEPTIBLE SWEATING. PALMS MOIST
TOTAL SCORE: 5
NAUSEA AND VOMITING: NO NAUSEA AND NO VOMITING
VISUAL DISTURBANCES: NOT PRESENT
ANXIETY: MILDLY ANXIOUS
ORIENTATION AND CLOUDING OF SENSORIUM: ORIENTED AND CAN DO SERIAL ADDITIONS
TREMOR: NO TREMOR
AGITATION: NORMAL ACTIVITY
NAUSEA AND VOMITING: NO NAUSEA AND NO VOMITING
PAROXYSMAL SWEATS: NO SWEAT VISIBLE
ANXIETY: MILDLY ANXIOUS
NAUSEA AND VOMITING: NO NAUSEA AND NO VOMITING
AGITATION: SOMEWHAT MORE THAN NORMAL ACTIVITY
ANXIETY: *
AUDITORY DISTURBANCES: NOT PRESENT
AGITATION: SOMEWHAT MORE THAN NORMAL ACTIVITY
ANXIETY: *
TOTAL SCORE: 2
TOTAL SCORE: 2
VISUAL DISTURBANCES: NOT PRESENT
HEADACHE, FULLNESS IN HEAD: NOT PRESENT
AUDITORY DISTURBANCES: NOT PRESENT
AGITATION: SOMEWHAT MORE THAN NORMAL ACTIVITY
ORIENTATION AND CLOUDING OF SENSORIUM: ORIENTED AND CAN DO SERIAL ADDITIONS
TREMOR: NO TREMOR
VISUAL DISTURBANCES: NOT PRESENT

## 2023-02-04 ASSESSMENT — ENCOUNTER SYMPTOMS
WEAKNESS: 1
SENSORY CHANGE: 1
HEADACHES: 1
FOCAL WEAKNESS: 1

## 2023-02-04 ASSESSMENT — PAIN DESCRIPTION - PAIN TYPE
TYPE: ACUTE PAIN

## 2023-02-04 ASSESSMENT — FIBROSIS 4 INDEX: FIB4 SCORE: 0.87

## 2023-02-04 NOTE — CARE PLAN
The patient is Stable - Low risk of patient condition declining or worsening    Shift Goals  Clinical Goals: SBP goal 100-140  Patient Goals: Rest  Family Goals: DANISH    Progress made toward(s) clinical / shift goals:    Problem: Fall Risk  Goal: Patient will remain free from falls  Description: Target End Date:  Prior to discharge or change in level of care    Document interventions on the [facility] Fall Risk Assessment    1.  Assess for fall risk factors  2.  Implement fall precautions  Outcome: Progressing     Problem: Neuro Status  Goal: Neuro status will remain stable or improve  Description: Target End Date:  Prior to discharge or change in level of care    Document on Neuro assessment in the Assessment flowsheet    1.  Assess and monitor neurologic status per provider order/protocol/unit policy  2.  Assess level of consciousness and orientation  3.  Assess for speech, dysarthria, dysphagia, facial symmetry  4.  Assess visual field, eye movements, gaze preference, pupil reaction and size  5.  Assess muscle strength and motor response in all four extremities  6.  Assess for sensation (numbness and tingling)  7.  Assess basic neuro reflexes (cough, gag, corneal)  8.  Identify changes in neuro status and report to provider for testing/treatment orders  Outcome: Progressing       Patient is not progressing towards the following goals:      Problem: Self Care  Goal: Patient will have the ability to perform ADLs independently or with assistance (bathe, groom, dress, toilet and feed)  Description: Target End Date:  Prior to discharge or change in level of care    Document on ADL flowsheet    1.  Assess the capability and level of deficiency to perform ADLs  2.  Encourage family/care giver involvement  3.  Provide assistive devices  4.  Consider PT/OT evaluations  5.  Maintain support, give positive feedback, encourage self-care allowing extra time and verbal cuing as needed  6.  Avoid doing something for patients  they can do themselves, but provide assistance as needed  7.  Assist in anticipating/planning individual needs  8.  Collaborate with Case Management and  to meet discharge needs  Outcome: Not Progressing

## 2023-02-04 NOTE — PROGRESS NOTES
Hospital Medicine Daily Progress Note    Date of Service  2/4/2023    Chief Complaint  Anali Pan is a 57 y.o. female admitted 1/31/2023 with headache    Hospital Course  57-year-old female with history of hypertension and alcoholism presented 1/31 with headache and right facial droop.  Initially patient went to [facility] emergency department and a NIH score was around 5, CT scan for head showed left basal ganglia and thalamic hemorrhagic stroke.  Patient was transferred to our facility for higher level of care, patient was admitted to ICU, nicardipine infusion to control blood pressure was initiated.  Patient was evaluated by intensivist and neurologist on admission, CTA for head did not show any significant stenosis in bilateral internal carotid.  MRI for head with and without contrast showed acute and subacute hemorrhage in the left thalamus and adjust partial corona radiata with mass-effect upon the posterior third ventricle and slight midline shift measured 4 to 5 mm.     Patient has history of alcoholism and drinking around 5-7 drinks daily, patient is on Precedex at the ICU and on the floor start with CIWA protocol.     Patient was transferred to neuro floor on 2/4. Per neurology Bp goal 100-140 STRICT and Na goal 135-145. On salt tabs    Interval Problem Update  Seen patient and daughter at bedside  Reports headache, improving  Off nicardipin gtt  Bp still high , cont amlodipine and clonidine. Add losartan Bp goal 100-140.  Cont salt tabs  PT/OT/PMR    I have discussed this patient's plan of care and discharge plan at IDT rounds today with Case Management, Nursing, Nursing leadership, and other members of the IDT team.    Consultants/Specialty  neurology    Code Status  Full Code    Disposition  Patient is not medically cleared for discharge.   Anticipate discharge to to an inpatient rehabilitation hospital.  I have placed the appropriate orders for post-discharge needs.    Review of Systems  Review  of Systems   Neurological:  Positive for sensory change, focal weakness, weakness and headaches.   All other systems reviewed and are negative.     Physical Exam  Temp:  [36.6 °C (97.9 °F)-37.4 °C (99.3 °F)] 36.6 °C (97.9 °F)  Pulse:  [70-93] 83  Resp:  [18-20] 18  BP: (115-150)/(85-98) 149/98  SpO2:  [90 %-97 %] 95 %    Physical Exam  Vitals and nursing note reviewed.   Constitutional:       Appearance: Normal appearance. She is ill-appearing.   HENT:      Head: Normocephalic and atraumatic.      Nose: Nose normal.      Mouth/Throat:      Pharynx: Oropharynx is clear.   Eyes:      Extraocular Movements: Extraocular movements intact.      Conjunctiva/sclera: Conjunctivae normal.      Pupils: Pupils are equal, round, and reactive to light.   Cardiovascular:      Rate and Rhythm: Normal rate and regular rhythm.      Pulses: Normal pulses.      Heart sounds: Normal heart sounds.   Pulmonary:      Effort: Pulmonary effort is normal.      Breath sounds: Normal breath sounds.   Abdominal:      General: Abdomen is flat. Bowel sounds are normal.      Palpations: Abdomen is soft.   Musculoskeletal:         General: Normal range of motion.      Cervical back: Normal range of motion and neck supple.   Skin:     General: Skin is warm and dry.   Neurological:      Mental Status: She is alert and oriented to person, place, and time.      Cranial Nerves: Cranial nerve deficit present.      Sensory: Sensory deficit present.      Coordination: Coordination abnormal.      Comments: R sided weakness, worse on RUE.    Psychiatric:         Mood and Affect: Mood normal.         Behavior: Behavior normal.       Fluids    Intake/Output Summary (Last 24 hours) at 2/4/2023 1229  Last data filed at 2/4/2023 0900  Gross per 24 hour   Intake 330 ml   Output 750 ml   Net -420 ml       Laboratory  Recent Labs     02/02/23  0350 02/03/23  0352 02/04/23  0304   WBC 7.6 6.0 5.7   RBC 4.14* 3.92* 4.11*   HEMOGLOBIN 13.1 12.5 13.1   HEMATOCRIT 38.9  37.7 39.2   MCV 94.0 96.2 95.4   MCH 31.6 31.9 31.9   MCHC 33.7 33.2* 33.4*   RDW 43.8 44.2 43.8   PLATELETCT 345 313 340   MPV 9.4 9.5 9.3     Recent Labs     02/02/23  0600 02/03/23  0352 02/04/23  0304   SODIUM 130* 131* 132*   POTASSIUM 3.7 4.3 3.6   CHLORIDE 97 100 101   CO2 21 21 20   GLUCOSE 123* 116* 122*   BUN 7* 18 16   CREATININE 0.40* 0.69 0.49*   CALCIUM 9.2 8.9 9.1                   Imaging  EC-ECHOCARDIOGRAM COMPLETE W/O CONT   Final Result      MR-BRAIN-WITH & W/O   Final Result         Acute-subacute hemorrhage in the left thalamus and adjacent parietal corona radiata with mass effect upon the posterior third ventricle and slight midline shift measuring 4 to 5 mm. Surrounding edema noted which extends to the cerebral peduncle on the    tectum.      No abnormal vascularity or abnormal enhancement is identified in the vicinity of the hemorrhage to suggest an underlying lesion.      Nonspecific T2 hyperintensities are noted in the periventricular and deep white matter, most likely related to chronic microvascular ischemia.      MR-MRA HEAD-W/O   Final Result      Normal intracranial MRA.           Assessment/Plan  * Nontraumatic acute hemorrhage of basal ganglia (HCC)- (present on admission)  Assessment & Plan  Likely related to uncontrolled hypertension  MRI showed subacute hemorrhagic left thalamus  CT head did not show obstruction on the carotid artery  Bp goal 100-140 STRICT, continue amlodipine, clonidine patch.  Add losartan  Echo showed PFO versus ASD, discussed with neurology, not related to the stroke.  PT and OT and physiatry consult, likely transfer to rehab next week.  Okay by neuro to start DVT prophylaxis  Neurology on board, appreciate recommendations      PFO (patent foramen ovale)  Assessment & Plan  Echo showed PFO versus ASD, right-to-left shunt with no signs of pulmonary hypertension.  Case was discussed with neurology team, patient has hemorrhagic stroke related to uncontrolled  hypertension, and PFO was not related to the stroke.  To follow-up with cardiology as outpatient, no indication at this time for aspirin or closing      Dyslipidemia  Assessment & Plan  Continue atorvastatin    Alcohol use  Assessment & Plan  Drinking around 5 drinks every day  Patient was on Precedex in the ICU  Patient had delirium and agitation and needed restraint  Continue multivitamin and thiamine  Start with CIWA at the floor  Continue nonpharmacological treatment for delirium  Alcohol cessation counseling     Hyponatremia- (present on admission)  Assessment & Plan  Monitoring with labs daily, fluid restriction  Patient is on salt tablets by intensivist  The goal 135-145         VTE prophylaxis: SCDs/TEDs and enoxaparin ppx    I have performed a physical exam and reviewed and updated ROS and Plan today (2/4/2023). In review of yesterday's note (2/3/2023), there are no changes except as documented above.

## 2023-02-04 NOTE — CARE PLAN
The patient is Stable - Low risk of patient condition declining or worsening    Shift Goals  Clinical Goals: SBP goal 100-140, CIWA  Patient Goals: Rest  Family Goals: Rest    Progress made toward(s) clinical / shift goals:    Problem: Lifestyle Changes  Goal: Patient's ability to identify lifestyle changes and available resources to help reduce recurrence of condition will improve  Description: Target End Date:  1 to 3 days    1.  Discuss recommended lifestyle changes  2.  Identify available resources and support systems  3.  Consider referral to substance abuse program  Outcome: Progressing     Problem: Skin Integrity  Goal: Skin integrity is maintained or improved  Description: Target End Date:  Prior to discharge or change in level of care    Document interventions on Skin Risk/Raciel flowsheet groups and corresponding LDA    1.  Assess and monitor skin integrity, appearance and/or temperature  2.  Assess risk factors for impaired skin integrity and/or pressures ulcers  3.  Implement precautions to protect skin integrity in collaboration with interdisciplinary team  4.  Implement pressure ulcer prevention protocol if at risk for skin breakdown  5.  Confirm wound care consult if at risk for skin breakdown  6.  Ensure patient use of pressure relieving devices  (Low air loss bed, waffle overlay, heel protectors, ROHO cushion, etc)  Outcome: Progressing       Patient is not progressing towards the following goals:      Problem: Self Care  Goal: Patient will have the ability to perform ADLs independently or with assistance (bathe, groom, dress, toilet and feed)  Description: Target End Date:  Prior to discharge or change in level of care    Document on ADL flowsheet    1.  Assess the capability and level of deficiency to perform ADLs  2.  Encourage family/care giver involvement  3.  Provide assistive devices  4.  Consider PT/OT evaluations  5.  Maintain support, give positive feedback, encourage self-care allowing  extra time and verbal cuing as needed  6.  Avoid doing something for patients they can do themselves, but provide assistance as needed  7.  Assist in anticipating/planning individual needs  8.  Collaborate with Case Management and  to meet discharge needs  Outcome: Not Progressing

## 2023-02-04 NOTE — CARE PLAN
The patient is Watcher - Medium risk of patient condition declining or worsening    Shift Goals  Clinical Goals: sbp goal 100-140  Patient Goals: sleeo  Family Goals: Rest    Progress made toward(s) clinical / shift goals:  prn BP meds needed and for CIWA  Problem: Fall Risk  Goal: Patient will remain free from falls  Outcome: Progressing  Note: Fall precautions in place     Problem: Optimal Care of the Stroke Patient  Goal: Optimal acute care for the stroke patient  Outcome: Progressing     Problem: Neuro Status  Goal: Neuro status will remain stable or improve  Outcome: Progressing     Problem: Hemodynamic Monitoring  Goal: Patient's hemodynamics, fluid balance and neurologic status will be stable or improve  Outcome: Progressing  Note: PRN BP meds given     Problem: Skin Integrity  Goal: Skin integrity is maintained or improved  Outcome: Progressing  Note: Able to turn and reposition independently     Problem: Optimal Care for Alcohol Withdrawal  Goal: Optimal Care for the alcohol withdrawal patient  Outcome: Progressing  Note: Ativan PRN       Patient is not progressing towards the following goals:

## 2023-02-05 PROBLEM — I10 HYPERTENSION: Status: ACTIVE | Noted: 2023-02-05

## 2023-02-05 LAB
ANION GAP SERPL CALC-SCNC: 11 MMOL/L (ref 7–16)
BUN SERPL-MCNC: 14 MG/DL (ref 8–22)
CALCIUM SERPL-MCNC: 9.4 MG/DL (ref 8.5–10.5)
CHLORIDE SERPL-SCNC: 102 MMOL/L (ref 96–112)
CO2 SERPL-SCNC: 22 MMOL/L (ref 20–33)
CREAT SERPL-MCNC: 0.52 MG/DL (ref 0.5–1.4)
ERYTHROCYTE [DISTWIDTH] IN BLOOD BY AUTOMATED COUNT: 42.9 FL (ref 35.9–50)
GFR SERPLBLD CREATININE-BSD FMLA CKD-EPI: 109 ML/MIN/1.73 M 2
GLUCOSE SERPL-MCNC: 114 MG/DL (ref 65–99)
HCT VFR BLD AUTO: 39.2 % (ref 37–47)
HGB BLD-MCNC: 13.4 G/DL (ref 12–16)
MAGNESIUM SERPL-MCNC: 2.1 MG/DL (ref 1.5–2.5)
MCH RBC QN AUTO: 32.1 PG (ref 27–33)
MCHC RBC AUTO-ENTMCNC: 34.2 G/DL (ref 33.6–35)
MCV RBC AUTO: 93.8 FL (ref 81.4–97.8)
PLATELET # BLD AUTO: 362 K/UL (ref 164–446)
PMV BLD AUTO: 9.3 FL (ref 9–12.9)
POTASSIUM SERPL-SCNC: 3.5 MMOL/L (ref 3.6–5.5)
RBC # BLD AUTO: 4.18 M/UL (ref 4.2–5.4)
SODIUM SERPL-SCNC: 135 MMOL/L (ref 135–145)
WBC # BLD AUTO: 5.6 K/UL (ref 4.8–10.8)

## 2023-02-05 PROCEDURE — A9270 NON-COVERED ITEM OR SERVICE: HCPCS | Performed by: STUDENT IN AN ORGANIZED HEALTH CARE EDUCATION/TRAINING PROGRAM

## 2023-02-05 PROCEDURE — 700105 HCHG RX REV CODE 258: Performed by: STUDENT IN AN ORGANIZED HEALTH CARE EDUCATION/TRAINING PROGRAM

## 2023-02-05 PROCEDURE — A9270 NON-COVERED ITEM OR SERVICE: HCPCS | Performed by: INTERNAL MEDICINE

## 2023-02-05 PROCEDURE — 700102 HCHG RX REV CODE 250 W/ 637 OVERRIDE(OP): Performed by: STUDENT IN AN ORGANIZED HEALTH CARE EDUCATION/TRAINING PROGRAM

## 2023-02-05 PROCEDURE — 700102 HCHG RX REV CODE 250 W/ 637 OVERRIDE(OP): Performed by: INTERNAL MEDICINE

## 2023-02-05 PROCEDURE — 80048 BASIC METABOLIC PNL TOTAL CA: CPT

## 2023-02-05 PROCEDURE — 36415 COLL VENOUS BLD VENIPUNCTURE: CPT

## 2023-02-05 PROCEDURE — 700111 HCHG RX REV CODE 636 W/ 250 OVERRIDE (IP): Performed by: STUDENT IN AN ORGANIZED HEALTH CARE EDUCATION/TRAINING PROGRAM

## 2023-02-05 PROCEDURE — 770001 HCHG ROOM/CARE - MED/SURG/GYN PRIV*

## 2023-02-05 PROCEDURE — 700101 HCHG RX REV CODE 250: Performed by: STUDENT IN AN ORGANIZED HEALTH CARE EDUCATION/TRAINING PROGRAM

## 2023-02-05 PROCEDURE — 99232 SBSQ HOSP IP/OBS MODERATE 35: CPT | Performed by: STUDENT IN AN ORGANIZED HEALTH CARE EDUCATION/TRAINING PROGRAM

## 2023-02-05 PROCEDURE — 83735 ASSAY OF MAGNESIUM: CPT

## 2023-02-05 PROCEDURE — 85027 COMPLETE CBC AUTOMATED: CPT

## 2023-02-05 RX ORDER — POTASSIUM CHLORIDE 20 MEQ/1
40 TABLET, EXTENDED RELEASE ORAL ONCE
Status: COMPLETED | OUTPATIENT
Start: 2023-02-05 | End: 2023-02-05

## 2023-02-05 RX ORDER — LOSARTAN POTASSIUM 50 MG/1
25 TABLET ORAL ONCE
Status: COMPLETED | OUTPATIENT
Start: 2023-02-05 | End: 2023-02-05

## 2023-02-05 RX ORDER — LOSARTAN POTASSIUM 50 MG/1
50 TABLET ORAL
Status: DISCONTINUED | OUTPATIENT
Start: 2023-02-06 | End: 2023-02-06 | Stop reason: HOSPADM

## 2023-02-05 RX ADMIN — AMLODIPINE BESYLATE 10 MG: 10 TABLET ORAL at 04:38

## 2023-02-05 RX ADMIN — LOSARTAN POTASSIUM 25 MG: 50 TABLET, FILM COATED ORAL at 08:23

## 2023-02-05 RX ADMIN — ENOXAPARIN SODIUM 40 MG: 40 INJECTION SUBCUTANEOUS at 16:40

## 2023-02-05 RX ADMIN — SENNOSIDES AND DOCUSATE SODIUM 2 TABLET: 50; 8.6 TABLET ORAL at 04:38

## 2023-02-05 RX ADMIN — ATORVASTATIN CALCIUM 10 MG: 10 TABLET, FILM COATED ORAL at 16:40

## 2023-02-05 RX ADMIN — LABETALOL HYDROCHLORIDE 10 MG: 5 INJECTION, SOLUTION INTRAVENOUS at 20:52

## 2023-02-05 RX ADMIN — LOSARTAN POTASSIUM 25 MG: 50 TABLET, FILM COATED ORAL at 04:38

## 2023-02-05 RX ADMIN — LABETALOL HYDROCHLORIDE 10 MG: 5 INJECTION, SOLUTION INTRAVENOUS at 16:39

## 2023-02-05 RX ADMIN — SODIUM CHLORIDE 2 G: 1 TABLET ORAL at 12:03

## 2023-02-05 RX ADMIN — HYDRALAZINE HYDROCHLORIDE 10 MG: 20 INJECTION INTRAMUSCULAR; INTRAVENOUS at 22:13

## 2023-02-05 RX ADMIN — POTASSIUM CHLORIDE 40 MEQ: 1500 TABLET, EXTENDED RELEASE ORAL at 08:24

## 2023-02-05 RX ADMIN — SODIUM CHLORIDE 2 G: 1 TABLET ORAL at 08:23

## 2023-02-05 RX ADMIN — SODIUM CHLORIDE 2 G: 1 TABLET ORAL at 16:40

## 2023-02-05 RX ADMIN — THIAMINE HYDROCHLORIDE 100 MG: 100 INJECTION, SOLUTION INTRAMUSCULAR; INTRAVENOUS at 04:43

## 2023-02-05 ASSESSMENT — ENCOUNTER SYMPTOMS
FOCAL WEAKNESS: 1
HEADACHES: 1
WEAKNESS: 1
SENSORY CHANGE: 1

## 2023-02-05 ASSESSMENT — LIFESTYLE VARIABLES
ANXIETY: MILDLY ANXIOUS
PAROXYSMAL SWEATS: NO SWEAT VISIBLE
AGITATION: NORMAL ACTIVITY
TOTAL SCORE: 1
ORIENTATION AND CLOUDING OF SENSORIUM: ORIENTED AND CAN DO SERIAL ADDITIONS
ORIENTATION AND CLOUDING OF SENSORIUM: ORIENTED AND CAN DO SERIAL ADDITIONS
AGITATION: NORMAL ACTIVITY
PAROXYSMAL SWEATS: NO SWEAT VISIBLE
PAROXYSMAL SWEATS: NO SWEAT VISIBLE
VISUAL DISTURBANCES: NOT PRESENT
NAUSEA AND VOMITING: NO NAUSEA AND NO VOMITING
HEADACHE, FULLNESS IN HEAD: NOT PRESENT
AUDITORY DISTURBANCES: NOT PRESENT
TOTAL SCORE: 1
TOTAL SCORE: 2
ANXIETY: *
AGITATION: NORMAL ACTIVITY
HEADACHE, FULLNESS IN HEAD: NOT PRESENT
VISUAL DISTURBANCES: NOT PRESENT
TREMOR: NO TREMOR
NAUSEA AND VOMITING: NO NAUSEA AND NO VOMITING
AUDITORY DISTURBANCES: NOT PRESENT
TREMOR: NO TREMOR
AUDITORY DISTURBANCES: NOT PRESENT
NAUSEA AND VOMITING: NO NAUSEA AND NO VOMITING
TREMOR: NO TREMOR
VISUAL DISTURBANCES: NOT PRESENT
ANXIETY: MILDLY ANXIOUS
ORIENTATION AND CLOUDING OF SENSORIUM: ORIENTED AND CAN DO SERIAL ADDITIONS
HEADACHE, FULLNESS IN HEAD: NOT PRESENT

## 2023-02-05 NOTE — CARE PLAN
The patient is Stable - Low risk of patient condition declining or worsening    Shift Goals  Clinical Goals: Maintain BP  Patient Goals: Have a BM  Family Goals: DANISH    Progress made toward(s) clinical / shift goals:     Problem: Neuro Status  Goal: Neuro status will remain stable or improve  Outcome: Progressing  Pt encouraged to pay attention/care for RUE, which she can be neglectful of.     Problem: Fall Risk  Goal: Patient will remain free from falls  Outcome: Progressing  Bed low and locked, alarm set, call bell within reach.       Patient is not progressing towards the following goals: N/A

## 2023-02-05 NOTE — PROGRESS NOTES
Hospital Medicine Daily Progress Note    Date of Service  2/5/2023    Chief Complaint  Anali Pan is a 57 y.o. female admitted 1/31/2023 with headache    Hospital Course  57-year-old female with history of hypertension and alcoholism presented 1/31 with headache and right facial droop.  Initially patient went to [facility] emergency department and a NIH score was around 5, CT scan for head showed left basal ganglia and thalamic hemorrhagic stroke.  Patient was transferred to our facility for higher level of care, patient was admitted to ICU, nicardipine infusion to control blood pressure was initiated.  Patient was evaluated by intensivist and neurologist on admission, CTA for head did not show any significant stenosis in bilateral internal carotid.  MRI for head with and without contrast showed acute and subacute hemorrhage in the left thalamus and adjust partial corona radiata with mass-effect upon the posterior third ventricle and slight midline shift measured 4 to 5 mm.     Patient has history of alcoholism and drinking around 5-7 drinks daily, patient is on Precedex at the ICU and on the floor start with CIWA protocol.     Patient was transferred to neuro floor on 2/4. Per neurology Bp goal 100-140 STRICT and Na goal 135-145. On salt tabs    Interval Problem Update  Seen patient and daughter at bedside  Reports headache, improving  Off nicardipin gtt  Bp still high , cont amlodipine and clonidine. Sbp 150 this am. Increased losartan to 50mg daily  Cont salt tabs. Na 135 today  K 3.5 replaced  PT/OT/PMR    I have discussed this patient's plan of care and discharge plan at IDT rounds today with Case Management, Nursing, Nursing leadership, and other members of the IDT team.    Consultants/Specialty  neurology    Code Status  Full Code    Disposition  Patient is not medically cleared for discharge.   Anticipate discharge to to an inpatient rehabilitation hospital.  I have placed the appropriate orders for  post-discharge needs.    Review of Systems  Review of Systems   Neurological:  Positive for sensory change, focal weakness, weakness and headaches.   All other systems reviewed and are negative.     Physical Exam  Temp:  [36.6 °C (97.9 °F)-37.2 °C (99 °F)] 36.6 °C (97.9 °F)  Pulse:  [70-85] 77  Resp:  [16-18] 17  BP: (139-163)/() 152/92  SpO2:  [93 %-98 %] 93 %    Physical Exam  Vitals and nursing note reviewed.   Constitutional:       Appearance: Normal appearance. She is ill-appearing.   HENT:      Head: Normocephalic and atraumatic.      Nose: Nose normal.      Mouth/Throat:      Pharynx: Oropharynx is clear.   Eyes:      Extraocular Movements: Extraocular movements intact.      Conjunctiva/sclera: Conjunctivae normal.      Pupils: Pupils are equal, round, and reactive to light.   Cardiovascular:      Rate and Rhythm: Normal rate and regular rhythm.      Pulses: Normal pulses.      Heart sounds: Normal heart sounds.   Pulmonary:      Effort: Pulmonary effort is normal.      Breath sounds: Normal breath sounds.   Abdominal:      General: Abdomen is flat. Bowel sounds are normal.      Palpations: Abdomen is soft.   Musculoskeletal:         General: Normal range of motion.      Cervical back: Normal range of motion and neck supple.   Skin:     General: Skin is warm and dry.   Neurological:      Mental Status: She is alert and oriented to person, place, and time.      Cranial Nerves: Cranial nerve deficit present.      Sensory: Sensory deficit present.      Coordination: Coordination abnormal.      Comments: R sided weakness, worse on RUE.    Psychiatric:         Mood and Affect: Mood normal.         Behavior: Behavior normal.       Fluids    Intake/Output Summary (Last 24 hours) at 2/5/2023 1119  Last data filed at 2/5/2023 1000  Gross per 24 hour   Intake 240 ml   Output --   Net 240 ml         Laboratory  Recent Labs     02/03/23  0352 02/04/23  0304 02/05/23  0459   WBC 6.0 5.7 5.6   RBC 3.92* 4.11* 4.18*    HEMOGLOBIN 12.5 13.1 13.4   HEMATOCRIT 37.7 39.2 39.2   MCV 96.2 95.4 93.8   MCH 31.9 31.9 32.1   MCHC 33.2* 33.4* 34.2   RDW 44.2 43.8 42.9   PLATELETCT 313 340 362   MPV 9.5 9.3 9.3       Recent Labs     02/03/23  0352 02/04/23  0304 02/05/23  0459   SODIUM 131* 132* 135   POTASSIUM 4.3 3.6 3.5*   CHLORIDE 100 101 102   CO2 21 20 22   GLUCOSE 116* 122* 114*   BUN 18 16 14   CREATININE 0.69 0.49* 0.52   CALCIUM 8.9 9.1 9.4                     Imaging  EC-ECHOCARDIOGRAM COMPLETE W/O CONT   Final Result      MR-BRAIN-WITH & W/O   Final Result         Acute-subacute hemorrhage in the left thalamus and adjacent parietal corona radiata with mass effect upon the posterior third ventricle and slight midline shift measuring 4 to 5 mm. Surrounding edema noted which extends to the cerebral peduncle on the    tectum.      No abnormal vascularity or abnormal enhancement is identified in the vicinity of the hemorrhage to suggest an underlying lesion.      Nonspecific T2 hyperintensities are noted in the periventricular and deep white matter, most likely related to chronic microvascular ischemia.      MR-MRA HEAD-W/O   Final Result      Normal intracranial MRA.             Assessment/Plan  * Nontraumatic acute hemorrhage of basal ganglia (HCC)- (present on admission)  Assessment & Plan  Likely related to uncontrolled hypertension  MRI showed subacute hemorrhagic left thalamus  CT head did not show obstruction on the carotid artery  Bp goal 100-140 STRICT, continue amlodipine, clonidine patch.  Add losartan  Echo showed PFO versus ASD, discussed with neurology, not related to the stroke.  PT and OT and physiatry consult, likely transfer to rehab next week.  Okay by neuro to start DVT prophylaxis  Neurology on board, appreciate recommendations      Hypertension  Assessment & Plan  Currently on amlodipine, losartan and clonidine patch  Per neurology Bp goal 100-140 STRICT      PFO (patent foramen ovale)  Assessment & Plan  Echo  showed PFO versus ASD, right-to-left shunt with no signs of pulmonary hypertension.  Case was discussed with neurology team, patient has hemorrhagic stroke related to uncontrolled hypertension, and PFO was not related to the stroke.  To follow-up with cardiology as outpatient, no indication at this time for aspirin or closing      Dyslipidemia  Assessment & Plan  Continue atorvastatin    Alcohol use  Assessment & Plan  Drinking around 5 drinks every day  Patient was on Precedex in the ICU  Patient had delirium and agitation and needed restraint  Continue multivitamin and thiamine  Start with CIWA at the floor  Continue nonpharmacological treatment for delirium  Alcohol cessation counseling     Hyponatremia- (present on admission)  Assessment & Plan  Monitoring with labs daily, fluid restriction  Patient is on salt tablets by intensivist  The goal 135-145         VTE prophylaxis: SCDs/TEDs and enoxaparin ppx    I have performed a physical exam and reviewed and updated ROS and Plan today (2/5/2023). In review of yesterday's note (2/4/2023), there are no changes except as documented above.

## 2023-02-05 NOTE — PROGRESS NOTES
Assumed care of pt at 0700. Pt alert and oriented x4. Denies any pain or discomfort. RUE/RLE remain weaker and ataxic than the left. No s/s alcohol withdrawal. Pt had a BM on commode today and then spent most of shift up in the chair. She was educated/encouraged to use the RUE, despite it being challenging. Family remains at bedside. Bed low and locked, alarm set and call bell within reach. Hourly rounding continues.

## 2023-02-05 NOTE — CARE PLAN
The patient is Watcher - Medium risk of patient condition declining or worsening    Shift Goals  Clinical Goals: sbp goal 100-140  Patient Goals: sleep  Family Goals: DANISH    Progress made toward(s) clinical / shift goals:  able to sleep,PRN bp given for elevated BP  Problem: Knowledge Deficit - Standard  Goal: Patient and family/care givers will demonstrate understanding of plan of care, disease process/condition, diagnostic tests and medications  Outcome: Progressing     Problem: Fall Risk  Goal: Patient will remain free from falls  Outcome: Progressing     Problem: Optimal Care of the Stroke Patient  Goal: Optimal acute care for the stroke patient  Outcome: Progressing     Problem: Knowledge Deficit - Stroke Education  Goal: Patient's knowledge of stroke and risk factors will improve  Outcome: Progressing     Problem: Hemodynamic Monitoring  Goal: Patient's hemodynamics, fluid balance and neurologic status will be stable or improve  Outcome: Progressing     Problem: Skin Integrity  Goal: Skin integrity is maintained or improved  Outcome: Progressing       Patient is not progressing towards the following goals:

## 2023-02-06 ENCOUNTER — HOSPITAL ENCOUNTER (INPATIENT)
Facility: REHABILITATION | Age: 58
LOS: 18 days | DRG: 057 | End: 2023-02-24
Attending: PHYSICAL MEDICINE & REHABILITATION | Admitting: PHYSICAL MEDICINE & REHABILITATION
Payer: COMMERCIAL

## 2023-02-06 VITALS
OXYGEN SATURATION: 94 % | DIASTOLIC BLOOD PRESSURE: 96 MMHG | TEMPERATURE: 98.2 F | WEIGHT: 174.82 LBS | HEIGHT: 68 IN | RESPIRATION RATE: 18 BRPM | BODY MASS INDEX: 26.5 KG/M2 | SYSTOLIC BLOOD PRESSURE: 138 MMHG | HEART RATE: 69 BPM

## 2023-02-06 DIAGNOSIS — E78.5 DYSLIPIDEMIA: ICD-10-CM

## 2023-02-06 DIAGNOSIS — I10 PRIMARY HYPERTENSION: ICD-10-CM

## 2023-02-06 DIAGNOSIS — I69.398 SPASTICITY AS LATE EFFECT OF CEREBROVASCULAR ACCIDENT (CVA): ICD-10-CM

## 2023-02-06 DIAGNOSIS — R25.2 SPASTICITY AS LATE EFFECT OF CEREBROVASCULAR ACCIDENT (CVA): ICD-10-CM

## 2023-02-06 PROBLEM — I61.9 HEMORRHAGIC STROKE (HCC): Status: ACTIVE | Noted: 2023-02-06

## 2023-02-06 LAB
ANION GAP SERPL CALC-SCNC: 11 MMOL/L (ref 7–16)
BUN SERPL-MCNC: 14 MG/DL (ref 8–22)
CALCIUM SERPL-MCNC: 9.3 MG/DL (ref 8.5–10.5)
CHLORIDE SERPL-SCNC: 105 MMOL/L (ref 96–112)
CO2 SERPL-SCNC: 19 MMOL/L (ref 20–33)
CREAT SERPL-MCNC: 0.55 MG/DL (ref 0.5–1.4)
ERYTHROCYTE [DISTWIDTH] IN BLOOD BY AUTOMATED COUNT: 41.7 FL (ref 35.9–50)
FLUAV RNA SPEC QL NAA+PROBE: NEGATIVE
FLUBV RNA SPEC QL NAA+PROBE: NEGATIVE
GFR SERPLBLD CREATININE-BSD FMLA CKD-EPI: 107 ML/MIN/1.73 M 2
GLUCOSE SERPL-MCNC: 94 MG/DL (ref 65–99)
HCT VFR BLD AUTO: 38.6 % (ref 37–47)
HGB BLD-MCNC: 13.4 G/DL (ref 12–16)
MAGNESIUM SERPL-MCNC: 2 MG/DL (ref 1.5–2.5)
MCH RBC QN AUTO: 32.1 PG (ref 27–33)
MCHC RBC AUTO-ENTMCNC: 34.7 G/DL (ref 33.6–35)
MCV RBC AUTO: 92.3 FL (ref 81.4–97.8)
PLATELET # BLD AUTO: 374 K/UL (ref 164–446)
PMV BLD AUTO: 9.2 FL (ref 9–12.9)
POTASSIUM SERPL-SCNC: 3.7 MMOL/L (ref 3.6–5.5)
RBC # BLD AUTO: 4.18 M/UL (ref 4.2–5.4)
RSV RNA SPEC QL NAA+PROBE: NEGATIVE
SARS-COV-2 RNA RESP QL NAA+PROBE: NOTDETECTED
SODIUM SERPL-SCNC: 135 MMOL/L (ref 135–145)
SPECIMEN SOURCE: NORMAL
WBC # BLD AUTO: 6.2 K/UL (ref 4.8–10.8)

## 2023-02-06 PROCEDURE — 36415 COLL VENOUS BLD VENIPUNCTURE: CPT

## 2023-02-06 PROCEDURE — 700105 HCHG RX REV CODE 258: Performed by: STUDENT IN AN ORGANIZED HEALTH CARE EDUCATION/TRAINING PROGRAM

## 2023-02-06 PROCEDURE — 94760 N-INVAS EAR/PLS OXIMETRY 1: CPT

## 2023-02-06 PROCEDURE — 700102 HCHG RX REV CODE 250 W/ 637 OVERRIDE(OP): Performed by: PHYSICAL MEDICINE & REHABILITATION

## 2023-02-06 PROCEDURE — 83735 ASSAY OF MAGNESIUM: CPT

## 2023-02-06 PROCEDURE — A9270 NON-COVERED ITEM OR SERVICE: HCPCS | Performed by: STUDENT IN AN ORGANIZED HEALTH CARE EDUCATION/TRAINING PROGRAM

## 2023-02-06 PROCEDURE — 97535 SELF CARE MNGMENT TRAINING: CPT | Mod: CO

## 2023-02-06 PROCEDURE — 0241U HCHG SARS-COV-2 COVID-19 NFCT DS RESP RNA 4 TRGT MIC: CPT

## 2023-02-06 PROCEDURE — 770010 HCHG ROOM/CARE - REHAB SEMI PRIVAT*

## 2023-02-06 PROCEDURE — A9270 NON-COVERED ITEM OR SERVICE: HCPCS | Performed by: PHYSICAL MEDICINE & REHABILITATION

## 2023-02-06 PROCEDURE — 700102 HCHG RX REV CODE 250 W/ 637 OVERRIDE(OP): Performed by: STUDENT IN AN ORGANIZED HEALTH CARE EDUCATION/TRAINING PROGRAM

## 2023-02-06 PROCEDURE — 700111 HCHG RX REV CODE 636 W/ 250 OVERRIDE (IP): Performed by: STUDENT IN AN ORGANIZED HEALTH CARE EDUCATION/TRAINING PROGRAM

## 2023-02-06 PROCEDURE — 99223 1ST HOSP IP/OBS HIGH 75: CPT | Performed by: PHYSICAL MEDICINE & REHABILITATION

## 2023-02-06 PROCEDURE — 99239 HOSP IP/OBS DSCHRG MGMT >30: CPT | Performed by: STUDENT IN AN ORGANIZED HEALTH CARE EDUCATION/TRAINING PROGRAM

## 2023-02-06 PROCEDURE — 700102 HCHG RX REV CODE 250 W/ 637 OVERRIDE(OP): Performed by: INTERNAL MEDICINE

## 2023-02-06 PROCEDURE — 700111 HCHG RX REV CODE 636 W/ 250 OVERRIDE (IP): Performed by: PHYSICAL MEDICINE & REHABILITATION

## 2023-02-06 PROCEDURE — 85027 COMPLETE CBC AUTOMATED: CPT

## 2023-02-06 PROCEDURE — 97530 THERAPEUTIC ACTIVITIES: CPT

## 2023-02-06 PROCEDURE — A9270 NON-COVERED ITEM OR SERVICE: HCPCS | Performed by: INTERNAL MEDICINE

## 2023-02-06 PROCEDURE — 80048 BASIC METABOLIC PNL TOTAL CA: CPT

## 2023-02-06 RX ORDER — TRAZODONE HYDROCHLORIDE 50 MG/1
50 TABLET ORAL
Status: DISCONTINUED | OUTPATIENT
Start: 2023-02-06 | End: 2023-02-24 | Stop reason: HOSPADM

## 2023-02-06 RX ORDER — FOLIC ACID 1 MG/1
1 TABLET ORAL DAILY
Qty: 30 TABLET | Status: ON HOLD
Start: 2023-02-06 | End: 2023-02-23

## 2023-02-06 RX ORDER — POLYETHYLENE GLYCOL 3350 17 G/17G
1 POWDER, FOR SOLUTION ORAL
Status: CANCELLED | OUTPATIENT
Start: 2023-02-06

## 2023-02-06 RX ORDER — LANOLIN ALCOHOL/MO/W.PET/CERES
100 CREAM (GRAM) TOPICAL DAILY
Qty: 30 TABLET | Refills: 0 | Status: ON HOLD
Start: 2023-02-06 | End: 2023-02-23

## 2023-02-06 RX ORDER — BISACODYL 10 MG
10 SUPPOSITORY, RECTAL RECTAL
Status: CANCELLED | OUTPATIENT
Start: 2023-02-06

## 2023-02-06 RX ORDER — AMLODIPINE BESYLATE 10 MG/1
10 TABLET ORAL DAILY
Qty: 30 TABLET | Status: ON HOLD
Start: 2023-02-07 | End: 2023-02-23

## 2023-02-06 RX ORDER — HYDRALAZINE HYDROCHLORIDE 10 MG/1
10 TABLET, FILM COATED ORAL EVERY 8 HOURS PRN
Status: DISCONTINUED | OUTPATIENT
Start: 2023-02-06 | End: 2023-02-07

## 2023-02-06 RX ORDER — ENOXAPARIN SODIUM 100 MG/ML
40 INJECTION SUBCUTANEOUS DAILY
Status: DISCONTINUED | OUTPATIENT
Start: 2023-02-06 | End: 2023-02-24 | Stop reason: HOSPADM

## 2023-02-06 RX ORDER — ATORVASTATIN CALCIUM 10 MG/1
10 TABLET, FILM COATED ORAL EVERY EVENING
Status: DISCONTINUED | OUTPATIENT
Start: 2023-02-06 | End: 2023-02-07

## 2023-02-06 RX ORDER — POLYETHYLENE GLYCOL 3350 17 G/17G
1 POWDER, FOR SOLUTION ORAL
Status: DISCONTINUED | OUTPATIENT
Start: 2023-02-06 | End: 2023-02-08

## 2023-02-06 RX ORDER — CLONIDINE 0.1 MG/24H
1 PATCH, EXTENDED RELEASE TRANSDERMAL
Status: DISCONTINUED | OUTPATIENT
Start: 2023-02-10 | End: 2023-02-15

## 2023-02-06 RX ORDER — AMLODIPINE BESYLATE 5 MG/1
10 TABLET ORAL
Status: CANCELLED | OUTPATIENT
Start: 2023-02-07

## 2023-02-06 RX ORDER — SODIUM CHLORIDE 1 G/1
2 TABLET ORAL
Status: CANCELLED | OUTPATIENT
Start: 2023-02-06

## 2023-02-06 RX ORDER — LANOLIN ALCOHOL/MO/W.PET/CERES
3 CREAM (GRAM) TOPICAL NIGHTLY PRN
Status: DISCONTINUED | OUTPATIENT
Start: 2023-02-06 | End: 2023-02-24 | Stop reason: HOSPADM

## 2023-02-06 RX ORDER — AMOXICILLIN 250 MG
2 CAPSULE ORAL 2 TIMES DAILY
Status: CANCELLED | OUTPATIENT
Start: 2023-02-06

## 2023-02-06 RX ORDER — HYDROXYZINE HYDROCHLORIDE 25 MG/1
50 TABLET, FILM COATED ORAL EVERY 6 HOURS PRN
Status: DISCONTINUED | OUTPATIENT
Start: 2023-02-06 | End: 2023-02-24 | Stop reason: HOSPADM

## 2023-02-06 RX ORDER — ACETAMINOPHEN 325 MG/1
650 TABLET ORAL EVERY 4 HOURS PRN
Status: DISCONTINUED | OUTPATIENT
Start: 2023-02-06 | End: 2023-02-24 | Stop reason: HOSPADM

## 2023-02-06 RX ORDER — LOSARTAN POTASSIUM 50 MG/1
50 TABLET ORAL
Status: CANCELLED | OUTPATIENT
Start: 2023-02-07

## 2023-02-06 RX ORDER — LOSARTAN POTASSIUM 50 MG/1
50 TABLET ORAL DAILY
Qty: 30 TABLET | Status: ON HOLD
Start: 2023-02-07 | End: 2023-02-23

## 2023-02-06 RX ORDER — AMOXICILLIN 250 MG
2 CAPSULE ORAL 2 TIMES DAILY
Status: DISCONTINUED | OUTPATIENT
Start: 2023-02-06 | End: 2023-02-08

## 2023-02-06 RX ORDER — LACTULOSE 20 G/30ML
30 SOLUTION ORAL
Status: DISCONTINUED | OUTPATIENT
Start: 2023-02-06 | End: 2023-02-24 | Stop reason: HOSPADM

## 2023-02-06 RX ORDER — QUETIAPINE FUMARATE 25 MG/1
25 TABLET, FILM COATED ORAL 3 TIMES DAILY PRN
Status: DISCONTINUED | OUTPATIENT
Start: 2023-02-06 | End: 2023-02-24 | Stop reason: HOSPADM

## 2023-02-06 RX ORDER — MIDAZOLAM HYDROCHLORIDE 5 MG/ML
5 INJECTION INTRAMUSCULAR; INTRAVENOUS PRN
Status: DISCONTINUED | OUTPATIENT
Start: 2023-02-06 | End: 2023-02-24 | Stop reason: HOSPADM

## 2023-02-06 RX ORDER — BISACODYL 10 MG
10 SUPPOSITORY, RECTAL RECTAL
Status: DISCONTINUED | OUTPATIENT
Start: 2023-02-06 | End: 2023-02-08

## 2023-02-06 RX ORDER — SODIUM CHLORIDE 1 G/1
2 TABLET ORAL
Status: DISCONTINUED | OUTPATIENT
Start: 2023-02-06 | End: 2023-02-07

## 2023-02-06 RX ORDER — CLONIDINE 0.1 MG/24H
1 PATCH, EXTENDED RELEASE TRANSDERMAL
Status: CANCELLED | OUTPATIENT
Start: 2023-02-10

## 2023-02-06 RX ORDER — AMLODIPINE BESYLATE 5 MG/1
10 TABLET ORAL
Status: DISCONTINUED | OUTPATIENT
Start: 2023-02-07 | End: 2023-02-24 | Stop reason: HOSPADM

## 2023-02-06 RX ORDER — ATORVASTATIN CALCIUM 10 MG/1
10 TABLET, FILM COATED ORAL EVERY EVENING
Status: CANCELLED | OUTPATIENT
Start: 2023-02-06

## 2023-02-06 RX ORDER — SODIUM CHLORIDE 1 G/1
2 TABLET ORAL
Qty: 90 TABLET | Refills: 3 | Status: ON HOLD
Start: 2023-02-06 | End: 2023-02-23

## 2023-02-06 RX ORDER — ONDANSETRON 2 MG/ML
4 INJECTION INTRAMUSCULAR; INTRAVENOUS 4 TIMES DAILY PRN
Status: DISCONTINUED | OUTPATIENT
Start: 2023-02-06 | End: 2023-02-24 | Stop reason: HOSPADM

## 2023-02-06 RX ORDER — ECHINACEA PURPUREA EXTRACT 125 MG
2 TABLET ORAL PRN
Status: DISCONTINUED | OUTPATIENT
Start: 2023-02-06 | End: 2023-02-24 | Stop reason: HOSPADM

## 2023-02-06 RX ORDER — CLONIDINE 0.1 MG/24H
1 PATCH, EXTENDED RELEASE TRANSDERMAL
Qty: 4 PATCH | Status: ON HOLD
Start: 2023-02-10 | End: 2023-02-23

## 2023-02-06 RX ORDER — ENOXAPARIN SODIUM 100 MG/ML
40 INJECTION SUBCUTANEOUS DAILY
Status: CANCELLED | OUTPATIENT
Start: 2023-02-06

## 2023-02-06 RX ORDER — LOSARTAN POTASSIUM 25 MG/1
50 TABLET ORAL
Status: DISCONTINUED | OUTPATIENT
Start: 2023-02-07 | End: 2023-02-07

## 2023-02-06 RX ORDER — ONDANSETRON 4 MG/1
4 TABLET, ORALLY DISINTEGRATING ORAL 4 TIMES DAILY PRN
Status: DISCONTINUED | OUTPATIENT
Start: 2023-02-06 | End: 2023-02-24 | Stop reason: HOSPADM

## 2023-02-06 RX ORDER — ALUMINA, MAGNESIA, AND SIMETHICONE 2400; 2400; 240 MG/30ML; MG/30ML; MG/30ML
20 SUSPENSION ORAL
Status: DISCONTINUED | OUTPATIENT
Start: 2023-02-06 | End: 2023-02-24 | Stop reason: HOSPADM

## 2023-02-06 RX ORDER — HYDRALAZINE HYDROCHLORIDE 25 MG/1
25 TABLET, FILM COATED ORAL EVERY 8 HOURS PRN
Status: DISCONTINUED | OUTPATIENT
Start: 2023-02-06 | End: 2023-02-07

## 2023-02-06 RX ORDER — ATORVASTATIN CALCIUM 10 MG/1
10 TABLET, FILM COATED ORAL EVERY EVENING
Qty: 30 TABLET | Status: ON HOLD
Start: 2023-02-06 | End: 2023-02-23

## 2023-02-06 RX ADMIN — ENOXAPARIN SODIUM 40 MG: 40 INJECTION SUBCUTANEOUS at 17:00

## 2023-02-06 RX ADMIN — LOSARTAN POTASSIUM 50 MG: 50 TABLET, FILM COATED ORAL at 05:21

## 2023-02-06 RX ADMIN — SODIUM CHLORIDE 2 G: 1 TABLET ORAL at 08:27

## 2023-02-06 RX ADMIN — SENNOSIDES AND DOCUSATE SODIUM 2 TABLET: 50; 8.6 TABLET ORAL at 05:21

## 2023-02-06 RX ADMIN — SODIUM CHLORIDE 2 G: 1 TABLET ORAL at 11:48

## 2023-02-06 RX ADMIN — ATORVASTATIN CALCIUM 10 MG: 10 TABLET, FILM COATED ORAL at 21:23

## 2023-02-06 RX ADMIN — SODIUM CHLORIDE 2 G: 1 TABLET ORAL at 16:59

## 2023-02-06 RX ADMIN — AMLODIPINE BESYLATE 10 MG: 10 TABLET ORAL at 05:21

## 2023-02-06 RX ADMIN — THIAMINE HYDROCHLORIDE 100 MG: 100 INJECTION, SOLUTION INTRAMUSCULAR; INTRAVENOUS at 05:25

## 2023-02-06 ASSESSMENT — LIFESTYLE VARIABLES
HOW MANY TIMES IN THE PAST YEAR HAVE YOU HAD 5 OR MORE DRINKS IN A DAY: 0
EVER HAD A DRINK FIRST THING IN THE MORNING TO STEADY YOUR NERVES TO GET RID OF A HANGOVER: NO
EVER_SMOKED: NEVER
HAVE PEOPLE ANNOYED YOU BY CRITICIZING YOUR DRINKING: NO
EVER FELT BAD OR GUILTY ABOUT YOUR DRINKING: NO
CONSUMPTION TOTAL: NEGATIVE
TOTAL SCORE: 1
TOTAL SCORE: 1
AVERAGE NUMBER OF DAYS PER WEEK YOU HAVE A DRINK CONTAINING ALCOHOL: 5
HAVE YOU EVER FELT YOU SHOULD CUT DOWN ON YOUR DRINKING: YES
ALCOHOL_USE: YES
ON A TYPICAL DAY WHEN YOU DRINK ALCOHOL HOW MANY DRINKS DO YOU HAVE: 1
TOTAL SCORE: 1

## 2023-02-06 ASSESSMENT — COGNITIVE AND FUNCTIONAL STATUS - GENERAL
CLIMB 3 TO 5 STEPS WITH RAILING: TOTAL
TOILETING: A LOT
HELP NEEDED FOR BATHING: A LOT
PERSONAL GROOMING: A LOT
TURNING FROM BACK TO SIDE WHILE IN FLAT BAD: UNABLE
DRESSING REGULAR UPPER BODY CLOTHING: A LOT
SUGGESTED CMS G CODE MODIFIER DAILY ACTIVITY: CL
MOVING TO AND FROM BED TO CHAIR: UNABLE
MOVING FROM LYING ON BACK TO SITTING ON SIDE OF FLAT BED: UNABLE
STANDING UP FROM CHAIR USING ARMS: A LOT
WALKING IN HOSPITAL ROOM: A LOT
MOBILITY SCORE: 8
EATING MEALS: A LOT
DRESSING REGULAR LOWER BODY CLOTHING: A LOT
DAILY ACTIVITIY SCORE: 12
SUGGESTED CMS G CODE MODIFIER MOBILITY: CM

## 2023-02-06 ASSESSMENT — GAIT ASSESSMENTS: GAIT LEVEL OF ASSIST: UNABLE TO PARTICIPATE

## 2023-02-06 ASSESSMENT — PATIENT HEALTH QUESTIONNAIRE - PHQ9
SUM OF ALL RESPONSES TO PHQ9 QUESTIONS 1 AND 2: 0
1. LITTLE INTEREST OR PLEASURE IN DOING THINGS: NOT AT ALL
2. FEELING DOWN, DEPRESSED, IRRITABLE, OR HOPELESS: NOT AT ALL

## 2023-02-06 ASSESSMENT — PAIN DESCRIPTION - PAIN TYPE: TYPE: ACUTE PAIN

## 2023-02-06 ASSESSMENT — FIBROSIS 4 INDEX: FIB4 SCORE: 0.79

## 2023-02-06 NOTE — DISCHARGE PLANNING
Case Management Discharge Planning    Admission Date: 1/31/2023  GMLOS: 4.5  ALOS: 6        Anticipated Discharge Dispo: Discharge Disposition: Disch to  rehab facility or distinct part unit (62)      Action(s) Taken:  Patient medically cleared for DC & accepted for transfer to [facility] today with [facility] scheduled for 11:30-Noon.  CM updated patient & daughter, Briseida #[phone], on DCP.     Escalations Completed: None    Medically Clear: Yes    Next Steps: CM remains available for assistance with DCP.    Barriers to Discharge: None    Is the patient up for discharge tomorrow:  Medically cleared

## 2023-02-06 NOTE — THERAPY
Physical Therapy   Daily Treatment     Patient Name: Anali Pan  Age:  57 y.o., Sex:  female  Medical Record #: [id number]  Today's Date: 2/6/2023     Precautions: Fall Risk;Swallow Precautions     Assessment  Pt motivated and participatory, min-mod A for STS and balance. RN informing pt and PT that pt is transferring to rehab today. If pt remains in acute, will follow.     Plan  Continue Current Treatment Plan  DC Equipment Recommendations: Unable to determine at this time  Discharge Recommendations: Recommend post-acute placement for additional physical therapy services prior to discharge home     02/06/23 1043   Cognition    Level of Consciousness Alert   Ability To Follow Commands 1 Step   Attention Impaired   Comments perseverating on blurry vision and glasses not belonging to her, is redirectable   Balance   Sitting Balance (Static) Fair   Sitting Balance (Dynamic) Fair   Standing Balance (Static) Poor   Standing Balance (Dynamic) Poor -   Weight Shift Sitting Fair   Weight Shift Standing Poor   Skilled Intervention Verbal Cuing;Tactile Cuing;Postural Facilitation   Comments first standing with FWW, then with just therapist assist   Bed Mobility    Comments up pre/post   Gait Analysis   Gait Level Of Assist Unable to Participate   Functional Mobility   Sit to Stand Minimal Assist   Skilled Intervention Verbal Cuing;Sequencing;Facilitation   Comments STS x2 with min A for lift off and mod A to maintain upright, first standing with FWW, then with just therapist assist for improved R knee block. able to weight shift laterally. session short d/t RN arriving and stating pt is being transferred to rehab in 1 hr, needing a shower, and PT did not want to fatigue pt prior to rehab.   Short Term Goals    Short Term Goal # 1 Pt will transition from supine to EOB w/ [place] in 6 visits to improve independence in bed mobility   Goal Outcome # 1 Progressing as expected   Short Term Goal # 3 Pt will transfer from EOB to  chair w/ [place] in 6 visits to improve OOB mobility   Goal Outcome # 3 Progressing as expected   Short Term Goal # 4 Pt will ambulate 150 ft w/ LRAD w/ SPV in 6 visits to access household distances   Goal Outcome # 4 Goal not met   Short Term Goal # 5 Pt will negotiate 1 step w/ [place] in 6 visits to access household   Goal Outcome # 5 Goal not met

## 2023-02-06 NOTE — H&P
"REHABILITATION HISTORY AND PHYSICAL/POST ADMISSION EVALUATION    2/6/2023  2:36 PM  Anali Pan  [id number]/02  Admission  2/6/2023 12:16 PM  [facility] Code/Reason for admission: 0001.2 - Stroke: Right Body Involvement (Left Brain)   Etiologic diagnosis/problem: Hemorrhagic stroke (HCC)  Chief Complaint: right sided weakness and blurry vision    HPI:  Per Dr. Marcum's consult, \"The patient is a 57 y.o.  female with a past medical history of hypertension noncompliant on antihypertensive medications and alcohol use of approximately 5-6 drinks per day;  who presented on 1/31/2023 11:17 PM as a transfer from [facility] with right-sided weakness.  Per documentation, patient had acute onset right-sided weakness and difficulty speaking, she arrived at the outside hospital with SBP up to 200.  A CT head was obtained which showed an acute left thalamic hemorrhage patient was transferred to [facility] for higher level of care.  Patient was admitted to the ICU with strict SBP goal of 100-1 40.  She was placed on a nicardipine drip for blood pressure control.  An MRI brain was obtained with evidence of an acute/subacute hemorrhage in the left thalamus adjacent to the parietal corona radiata with mass effect upon the posterior third ventricle with slight midline shift measuring 4 to 5 mm there is no evidence of abnormality relating to underlying lesion or abnormal vascularity.  Patient's hospital course has been complicated by onset of alcohol withdrawal, patient had poor participation with therapy due to anxiety and unwillingness to complete full commands for therapy session.  Patient is on a Precedex drip for agitation.  Echocardiogram was obtained with an EF of 70%, grade 1 diastolic dysfunction with evidence right right to left shift/PFO.\"    HgbA1c 5.1, .      Patient currently reports right sided weakness, abnormal sensation on the right, and blurry vision since her stroke. She denies any headache, or pain. " She has had some urinary urgency, no dysuria, as well as mild constipation, though she hasn't been eating much. She does want to control her blood pressure and wants to stop drinking.     Patient was evaluated by Rehab Medicine physician and Physical Therapy, Occupational Therapy, and Speech Therapy and determined to be appropriate for acute inpatient rehab and was transferred to [facility] on 2/6/2023 12:16 PM.    With this acute therapeutic intervention, this patient hopes to improve her functional status, and return to independent living with the supportive care of family.    REVIEW OF SYSTEMS:     A complete review of systems was performed and was negative in detail with the exception of items mentioned elsewhere in this document.    PMH:  Past Medical History:   Diagnosis Date    Alcohol use     Hypertension        PSH:  Past Surgical History:   Procedure Laterality Date    OVARIAN CYSTECTOMY         Family History   Problem Relation Age of Onset    Breast Cancer Mother     Hypertension Mother     Thyroid Mother         Hyperthyoidism    Cancer Mother         Breast    Hypertension Father     Diabetes Father         Type 2    Hypertension Brother         Gout        MEDICATIONS:  Scheduled Medications   Medication Dose Frequency    Pharmacy Consult Request  1 Each PHARMACY TO DOSE    [START ON 2/7/2023] amLODIPine  10 mg Q DAY    atorvastatin  10 mg Q EVENING    [START ON 2/10/2023] cloNIDine  1 Patch Q7 DAYS    enoxaparin (LOVENOX) injection  40 mg DAILY AT 1800    [START ON 2/7/2023] losartan  50 mg Q DAY    senna-docusate  2 Tablet BID    sodium chloride  2 g TID WITH MEALS       ALLERGIES:  Patient has no known allergies.    PSYCHOSOCIAL HISTORY:  Patient lives alone however has a boyfriend that lives down the street in a one-story house with one-step to enter in [place].  Boyfriend works full-time, patient's mother and daughter Missy also local and they can provide 24/7 between them  all.    Works as a , denies tobacco or drugs, drinks 4-5 drinks of hard liquor or wine daily.     LEVEL OF FUNCTION PRIOR TO DISABILTY:  Independent, working    LEVEL OF FUNCTION PRIOR TO ADMISSION to [facility]:  PT 2/6:    Cognition    Level of Consciousness Alert   Ability To Follow Commands 1 Step   Attention Impaired   Comments perseverating on blurry vision and glasses not belonging to her, is redirectable   Balance   Sitting Balance (Static) Fair   Sitting Balance (Dynamic) Fair   Standing Balance (Static) Poor   Standing Balance (Dynamic) Poor -   Weight Shift Sitting Fair   Weight Shift Standing Poor   Skilled Intervention Verbal Cuing;Tactile Cuing;Postural Facilitation   Comments first standing with FWW, then with just therapist assist   Bed Mobility    Comments up pre/post   Gait Analysis   Gait Level Of Assist Unable to Participate   Functional Mobility   Sit to Stand Minimal Assist   Skilled Intervention Verbal Cuing;Sequencing;Facilitation   Comments STS x2 with min A for lift off and mod A to maintain upright, first standing with FWW, then with just therapist assist for improved R knee block. able to weight shift laterally. session short d/t RN arriving and stating pt is being transferred to rehab in 1 hr, needing a shower, and PT did not want to fatigue pt prior to rehab.       OT 2/6:    Balance   Sitting Balance (Static) Fair -   Sitting Balance (Dynamic) Fair -   Standing Balance (Static) Poor   Standing Balance (Dynamic) Poor -   Weight Shift Sitting Fair   Weight Shift Standing Poor   Bed Mobility    Comments up in chair pre and post session   Activities of Daily Living   Grooming Moderate Assist;Seated   Upper Body Dressing Moderate Assist   Lower Body Dressing Maximal Assist   Toileting Moderate Assist   Functional Mobility   Sit to Stand Minimal Assist   Bed, Chair, Wheelchair Transfer Minimal Assist   Toilet Transfers Minimal Assist       SLP 2/2:    Assessment    "  Patient was seen for dysphagia management/high follow up from initial evaluation done yesterday, 2/1. Oral [facility] exam repeated with improvement in facial symmetry/weakness and no significant changes noted. Pt seen with breakfast tray of minced & moist solids and thin liquids. Patient was able to feed herself. Patient with appropriate oral acceptance marked by adequate labial seal to contain the bolus orally. Adequate bolus formation and oral clearance noted. No coughing or throat clearing with sips of thin liquids via cup/straw. Pt was self-limiting with PO intake, suspect due to lethargy and dislike of food items on meal tray. No overt s/sx of aspiration appreciated with limited PO intake. SLP will continue to monitor closely for any difficulties or change in respiratory status. Continue with modified diet at this time. An instrumental swallow study may be indicated pending clinical progress and if in alignment with GOC.      Recommendations  1.  Minced and moist with thin liquids  2.  Instrumentation: FEES, Instrumental swallow study pending clinical progress  3.  Swallowing Instructions & Precautions:   Supervision: 1:1 feeding with constant supervision, Encourage self-feeding  Positioning: Fully upright and midline during oral intake  Medication: Whole with puree, Crush with applesauce or puree, as appropriate  Strategies: Small bites/sips, Slow rate of intake  Oral Care: Q6h  4.  HOLD with lethargy or with difficulty. If persistent s/sx concerning for dysphagia occur, please hold tray and contact SLP.        CURRENT LEVEL OF FUNCTION:   Same as level of function prior to admission to [facility]    PHYSICAL EXAM:     VITAL SIGNS:   height is 1.676 m (5' 6\") and weight is 80.2 kg (176 lb 14.4 oz). Her axillary temperature is 37.2 °C (98.9 °F). Her blood pressure is 149/99 (abnormal) and her pulse is 79. Her respiration is 18 and oxygen saturation is 97%.     GENERAL: No apparent " distress  HEENT: Normocephalic/atraumatic, EOMI, PERRL, and No nystagmus  CARDIAC: Regular rate and rhythm, normal S1, S2, no murmurs, no peripheral edema   LUNGS: Clear to auscultation, normal respiratory effort, on room air   ABDOMINAL: bowel sounds present, soft, nontender, and nondistended    EXTREMITIES: no edema or 2+ bilateral DP/PT pulses  MSK: No joint swelling    NEURO:    Mental status: alert  Speech: fluent, no aphasia or dysarthria    CRANIAL NERVES:  2,3: visual acuity grossly intact, PERRL  3,4,6: EOMI bilaterally, no nystagmus or diplopia  5: decreased to light touch on right face  7: mild right facial weakness  8: hearing grossly intact  9,10: symmetric palate elevation  11: SCM/Trapezius strength 2/5 R, 5/5 L  12: tongue protrudes midline    Motor:  Shoulder flexors:  Right -  1-2/5, Left -  5/5  Elbow flexors:  Right -  1-2/5, Left -  5/5  Elbow extensors:  Right -  1-2/5, Left -  5/5  Weak  on right  Hip flexors:  Right -  3/5, Left -  5/5  Knee ext:  Right -  3/5, Left -  5/5  Dorsiflexors:  Right -  3/5, Left -  5/5  Plantar flexors:  Right -  3/5, Left -  5/5     Sensory:   Decreased to light touch on right arm and leg    DTRs:   3+ in bilateral biceps, triceps, brachioradialis  3+ in bilateral patellar tendons and 2+ in bilateral achilles tendons  No clonus at bilateral ankles  Negative babinski b/l  Positive [place] on right    Tone: no spasticity noted      RADIOLOGY:    Imaging personally reviewed and interpreted by me.      Results for orders placed during the hospital encounter of 01/31/23    MR-BRAIN-WITH & W/O    Impression  Acute-subacute hemorrhage in the left thalamus and adjacent parietal corona radiata with mass effect upon the posterior third ventricle and slight midline shift measuring 4 to 5 mm. Surrounding edema noted which extends to the cerebral peduncle on the  tectum.    No abnormal vascularity or abnormal enhancement is identified in the vicinity of the hemorrhage to  suggest an underlying lesion.    Nonspecific T2 hyperintensities are noted in the periventricular and deep white matter, most likely related to chronic microvascular ischemia.                                                                                                                  LABS:  Recent Labs     02/04/23 0304 02/05/23 0459 02/06/23 0224   SODIUM 132* 135 135   POTASSIUM 3.6 3.5* 3.7   CHLORIDE 101 102 105   CO2 20 22 19*   GLUCOSE 122* 114* 94   BUN 16 14 14   CREATININE 0.49* 0.52 0.55   CALCIUM 9.1 9.4 9.3     Recent Labs     02/04/23 0304 02/05/23 0459 02/06/23 0224   WBC 5.7 5.6 6.2   RBC 4.11* 4.18* 4.18*   HEMOGLOBIN 13.1 13.4 13.4   HEMATOCRIT 39.2 39.2 38.6   MCV 95.4 93.8 92.3   MCH 31.9 32.1 32.1   MCHC 33.4* 34.2 34.7   RDW 43.8 42.9 41.7   PLATELETCT 340 362 374   MPV 9.3 9.3 9.2         PRIMARY REHAB DIAGNOSIS:    This patient is a 57 y.o. female admitted for acute inpatient rehabilitation with Hemorrhagic stroke (HCC).    IMPAIRMENTS:   Cognitive  ADLs/IADLs  Mobility    SECONDARY DIAGNOSIS/MEDICAL CO-MORBIDITIES AFFECTING FUNCTION:    Hypertension  Alcohol use  PFO  Hyponatremia    RELEVANT CHANGES SINCE PREADMISSION EVALUATION:    Status unchanged    The patient's rehabilitation potential is Excellent  The patient's medical prognosis is excellent    PLAN:   Discussion and Recommendations, discussed with the patient and/or family:   1. The patient requires an acute inpatient rehabilitation program with a coordinated program of care at an intensity and frequency not available at a lower level of care. This recommendation is substantiated by the patient's medical physicians who recommend that the patient's intervention and assessment of medical issues needs to be done at an acute level of care for patient's safety and maximum outcome.     2. A coordinated program of care will be supplied by an interdisciplinary team of physical therapy, occupational therapy, rehab physician, rehab  nursing, and, if needed, speech therapy and rehab psychology. Rehab team presents a patient-specific rehabilitation and education program concentrating on prevention of future problems related to accessibility, mobility, skin, bowel, bladder, sexuality, and psychosocial and medical/surgical problems.     3. Need for Rehabilitation Physician: The rehab physician will be evaluating the patient on a multi-weekly basis to help coordinate the program of care. The rehab physician communicates between medical physicians, therapists, and nurses to maximize the patient's potential outcome. Specific areas in which the rehab physician will be providing daily assessment include the following:   A. Assessing the patient's heart rate and blood pressure response (vitals monitoring) to activity and making adjustments in medications or conservative measures as needed.   B. The rehab physician will be assessing the frequency at which the program can be increased to allow the patient to reach optimal functional outcome.   C. The rehab physician will also provide assessments in daily skin care, especially in light of patient's impairments in mobility.   D. The rehab physician will provide special expertise in understanding how to work with functional impairment and recommend appropriate interventions, compensatory techniques, and education that will facilitate the patient's outcome.     4. Rehab R.N.   The rehab RN will be working with patient to carry over in room mobility and activities of daily living when the patient is not in 3 hours of skilled therapy. Rehab nursing will be working in conjunction with rehab physician to address all the medical issues above and continue to assess laboratory work and discuss abnormalities with the treating physicians, assess vitals, and response to activity, and discuss and report abnormalities with the rehab physician. Rehab RN will also continue daily skin care, supervise bladder/bowel program,  instruct in medication administration, and ensure patient safety.     5. Therapies to treat at intensity and frequency of (may change after completion of evaluation by all therapeutic disciplines):       PT:  Physical therapy to address mobility, transfer, gait training and evaluation for adaptive equipment needs 1hour/day at least 5 days/week for the duration of the ELOS (see below)       OT:  Occupational therapy to address ADLs, self-care, home management training, functional mobility/transfers and assistive device evaluation, and community re-integration 1hour/day at least 5 days/week for the duration of the ELOS (see below).        ST/Dysphagia:  Speech therapy to address speech, language, and cognitive deficits as well as swallowing difficulties with retraining/dysphagia management and community re-integration with comprehension, expression, cognitive training 1hour/day at least 5 days/week for the duration of the ELOS (see below).     6. Medical management / Rehabilitation Issues/Adverse Potential affecting function as part of rehabilitation plan.    Hypertension  Clonidine, will attempt to titrate off, not good for stroke recovery  Amlodipine  Losartan  As needed hydralazine    Alcohol use  Will need counseling    PFO  Not cause of stroke  Outpatient follow up    Hyponatremia  Titrate off salt tabs    I performed a complete drug regimen review and did not identify any potential clinically significant medication issues.    The patient's CODE STATUS was confirmed as FULL CODE on admission, with the patient and/or family at bedside.    REHABILITATION ISSUES/ADVERSE POTENTIAL:  1.  CVA (Cerebrovascular Accident): Continue lipid and blood pressure management. Patient demonstrates functional deficits in strength, balance, coordination, and ADL's. Patient is admitted to [facility] for comprehensive rehabilitation therapy as described below.   Rehabilitation nursing monitors bowel and bladder  control, educates on medication administration, co-morbidities and monitors patient safety.    2.  DVT prophylaxis:  Patient is on Lovenox for anticoagulation upon transfer. Encourage OOB. Monitor daily for signs and symptoms of DVT including but not limited to swelling and pain to prevent the development of DVT that may interfere with therapies.    3.  Pain: No issues with pain currently / Controlled with as needed oral analgesics.    4.  Nutrition/Dysphagia: Dietician monitors nutrient intake, recommend supplements prn and provide nutrition education to pt/family to promote optimal nutrition for wound healing/recovery.     5.  Bladder/bowel:  Start bowel and bladder program, to prevent constipation, urinary retention (which may lead to UTI), and urinary incontinence (which will impact upon pt's functional independence).   - TV Q3h while awake with post void bladder scans, I&O cath for PVRs >400  - up to commode after meal     6.  Skin/dermal ulcer prophylaxis: Monitor for new skin conditions with q.2 h. turns as required to prevent the development of skin breakdown.     7.  GI prophylaxis: Omeprazole to prevent gastritis or GI bleed that would interfere with therapies.    8. Respiratory therapy: RT performs O2 management prn, breathing retraining, pulmonary hygiene and bronchospasm management prn to optimize participation in therapies.    Pt was seen today for 75 min, and entire time spent in face-to-face contact was >50% in counseling and coordination of care as detailed in A/P above.        GOALS/EXPECTED LEVEL OF FUNCTION BASED ON CURRENT MEDICAL AND FUNCTIONAL STATUS (may change based on patient's medical status and rate of impairment recovery):  Transfers:   Supervision  Mobility/Gait:   Supervision  ADL's:   Minimal Assistance  Cognition:  Least Verbal Cues      DISPOSITION: Discharge to pre-morbid independent living setting with the supportive care of patient's family.      ELOS: 21 days    Vanessa MCKNIGHT  URSULA Huizar.  Physical Medicine and Rehabilitation

## 2023-02-06 NOTE — DISCHARGE INSTRUCTIONS
Discharge Instructions per Oliver Orozco M.D.    Please follow-up with stroke bridge clinic as outpatient and PCP as outpatient       Return to ER in the event of new or worsening symptoms. Please note importance of compliance and the patient has agreed to proceed with all medical recommendations and follow up plan indicated above. All medications come with benefits and risks. Risks may include permanent injury or death and these risks can be minimized with close reassessment and monitoring. Please make it to your scheduled follow ups with PCP and stroke bridge clinic

## 2023-02-06 NOTE — CARE PLAN
The patient is Stable - Low risk of patient condition declining or worsening    Shift Goals  Clinical Goals: stable b/p  Patient Goals: rest  Family Goals: lala    Progress made toward(s) clinical / shift goals:    Patient continues to have htn will continue monitor  Problem: Knowledge Deficit - Standard  Goal: Patient and family/care givers will demonstrate understanding of plan of care, disease process/condition, diagnostic tests and medications  Description: Target End Date:  1-3 days or as soon as patient condition allows    Document in Patient Education    1.  Patient and family/caregiver oriented to unit, equipment, visitation policy and means for communicating concern  2.  Complete/review Learning Assessment  3.  Assess knowledge level of disease process/condition, treatment plan, diagnostic tests and medications  4.  Explain disease process/condition, treatment plan, diagnostic tests and medications  Outcome: Progressing     Problem: Fall Risk  Goal: Patient will remain free from falls  Description: Target End Date:  Prior to discharge or change in level of care    Document interventions on the Schaffer Sher Fall Risk Assessment    1.  Assess for fall risk factors  2.  Implement fall precautions  Outcome: Progressing     Problem: Optimal Care of the Stroke Patient  Goal: Optimal emergency care for the stroke patient  Description: Target End Date:  End of day 1    Time of Onset    1.  Time of last known well obtained  2.  Patient and family/caregiver verbalize understanding of diagnosis, medications and testing  3.  NIHSS performed and documented, including date and time, for ischemic stroke patients prior to any acute recanalization therapy (thrombolytics or mechanical) or within 12 hours of arrival if no intervention is warranted  4.  Consults and referrals placed to appropriate departments    Medications Administration as Ordered:    1.  Implement appropriate reversal agents for INR greater than 1.5  2.   Pre-alteplase administration of antihypertensives for SBP >185 DBP >110  3.  Post-alteplase administration of antihypertensives for SBP >185, DBP >105  4.  Thrombolytic Therapy for qualifying ischemic stroke patients who arrive within 4.5 hours of time of Last Known Well. Thrombolytic therapy administered within 30 minutes or a documented reason for delay  Outcome: Progressing  Goal: Optimal acute care for the stroke patient  Description: Target End Date:  1 to 3 days    - Vital signs and neuro checks performed and documented per order  - NIHSS completed and documented per order  - Continuous telemetry monitoring for 72 hours or until discontinued by provider  - Head CT without contrast obtained  - Consideration of MRI/MRA  - MRI screening form completed in worklist if MRI ordered  - Echocardiogram with Bubble Study ordered/completed with consideration of HERMAN  - Carotid Doppler ordered/completed (Not required if CTA of neck completed in ED)  - Lipid Panel obtained within 48 hours of admission  - PT, PTT, INR obtained per Anticoagulation orders (if applicable)  - Antithrombotic therapy by end of hospital day 2 for ischemic stroke. Provider must document reason if contraindicated.  - Venous Thromboembolism (VTE) Prophylaxis by end of hospital day 2 for ischemic and hemorrhagic stroke. Provider must document reason if contraindicated  - Dysphagia screen completed and documented prior to any PO intake. Patient to remain NPO until Speech Therapy evaluation if thrombolytic or thrombectomy performed  - Rehabilitation assessment including PT/OT/SLP evaluations for referral to Physical Medicine and Rehabilitation services. If none needed, provider needs to document reason  - Neurology consult placed  - Consideration of cardiology consult for cryptogenic strokes  Outcome: Progressing     Problem: Knowledge Deficit - Stroke Education  Goal: Patient's knowledge of stroke and risk factors will improve  Description: Target End  Date:  1-3 days or as soon as patient condition allows    Document in Patient Education    1.  Stroke education booklet provided  2.  Education regarding EMS activation, need for follow up, medication prescribed at discharge, risk factors for stroke/lifestyle modifications, warning signs and symptoms of stroke provided  Outcome: Progressing     Problem: Psychosocial - Patient Condition  Goal: Patient's ability to verbalize feelings about condition will improve  Description: Target End Date:  Prior to discharge or change in level of care    1.  Discuss coping with medical condition and its effects  2.  Encourage patient participation in care  3.  Encourage acknowledgement of body changes and accompanying emotions  4.  Perform depression screening  Outcome: Progressing  Goal: Patient's ability to re-evaluate and adapt role responsibilities will improve  Description: Target End Date:  Prior to discharge or change in level of care    1.  Assess family support  2.  Encourage support system participation in care  3.  Encouraged verbalization of feelings regarding caregiver responsibilities  4.  Discuss changes in role and responsibilities caused by patient's condition  Outcome: Progressing     Problem: Discharge Planning - Stroke  Goal: Ensure Stroke Core Measures are met prior to discharge  Description: Target End Date:  Prior to discharge or change in level of care    1. Patient discharged on antithrombotic therapy (Ischemic Stroke)  2. Patient discharged on intensive statin if LDL is greater than or equal to 70 mg/dl (Ischemic Stroke)  3. Patient discharged on anticoagulation therapy for patients with atrial fibrillation/flutter (Ischemic Stroke)  4. Smoking education/cessation provided if applicable  Outcome: Progressing  Goal: Patient’s continuum of care needs will be met  Description: Target End Date:  Prior to discharge or change in level of care    1.  Potential discharge barriers identified upon admission and  throughout hospital stay  2.  Ensure appropriate referrals in place for follow up with specialists after discharge  3.  Ensure appropriate referrals in place for DMEs if applicable  4.  Collaboration with transitional care team and interdisciplinary team to meet discharge needs  5.  Involve patient and family/caregiver in prioritizing goals for discharge planning  6.  Educate patient and caregiver about discharge instructions, medications, and follow up appointments  7.  Referral placed to [facility]  8.  Assure orders and follow up appointments are made for outpatient extended cardiac monitoring if appropriate  Outcome: Progressing     Problem: Neuro Status  Goal: Neuro status will remain stable or improve  Description: Target End Date:  Prior to discharge or change in level of care    Document on Neuro assessment in the Assessment flowsheet    1.  Assess and monitor neurologic status per provider order/protocol/unit policy  2.  Assess level of consciousness and orientation  3.  Assess for speech, dysarthria, dysphagia, facial symmetry  4.  Assess visual field, eye movements, gaze preference, pupil reaction and size  5.  Assess muscle strength and motor response in all four extremities  6.  Assess for sensation (numbness and tingling)  7.  Assess basic neuro reflexes (cough, gag, corneal)  8.  Identify changes in neuro status and report to provider for testing/treatment orders  Outcome: Progressing     Problem: Hemodynamic Monitoring  Goal: Patient's hemodynamics, fluid balance and neurologic status will be stable or improve  Description: Target End Date:  Prior to discharge or change in level of care    1.  Vital signs, pulse oximetry and cardiac monitor per provider order and/or policy  2.  Frequent pulse checks performed post thrombectomy  3.  Frequent monitoring for signs of bleeding post TPA administration  4.  Proper management of IV infusions  5.  Intake and output monitored per provider order  6.   Daily weight obtained per unit policy or provider order  7.  Peripheral pulses and capillary refill assessed as needed  8.  Monitor for signs/symptoms of excessive bleeding  9.  Body temperature assessed and fevers treated  10. Patient positioned for maximum circulation/cardiac output  Outcome: Progressing     Problem: Respiratory - Stroke Patient  Goal: Patient will achieve/maintain optimum respiratory rate/effort  Description: Target End Date:  Prior to discharge or change in level of care    Document on Assessment flowsheet    1.  Assess and monitor respiratory rate, rhythm, depth and effort of respiration  2.  Oxygenation assessed throughout shift (recommendation of >94% for new stroke patients)  3.  Oxygen administered and/or titrated per order  4.  Collaboration with RT to administer medication/treatments per order  5.  Patient educated on importance of turning, coughing, and deep breathing  6.  Patient positioned for maximum ventilatory efficiency  7.  Airway suctioning provided as needed  8.  Incentive spirometry encouraged 5-10 times every hour or while awake  Outcome: Progressing     Problem: Dysphagia  Goal: Dysphagia will improve  Description: Target End Date:  Prior to discharge or change in level of care    1.  Assess and monitor ability to swallow  2.  Collaborate with Speech Therapy to determine appropriate adaptation for safe administration of medications and oral nutrition  3.  Elevate head of bed to 90 degrees during feedings and for 30 minutes after each feeding  4.  Encourage proper swallowing techniques  5.  Screening on admission or as soon as possible  Outcome: Progressing     Problem: Risk for Aspiration  Goal: Patient's risk for aspiration will be absent or decrease  Description: Target End Date:  Prior to discharge or change in level of care    1.   Complete dysphagia screening on admission  2.   NPO until dysphagia screening complete or medically cleared  3.   Collaborate with Speech  Therapy, Clinical Dietitian and interdisciplinary team  4.   Implement aspiration precautions  5.   Assist patient up to chair for meals  6.   Elevate head of bed 90 degrees if patient is unable to get out of bed  7.   Encourage small bites  8.   Ensure foods/liquids are of appropriate consistency  9.   Assess for any signs/symptoms of aspiration  10. Assess breath sounds and vital signs after oral intake  Outcome: Progressing     Problem: Urinary Elimination  Goal: Establish and maintain regular urinary output  Description: Target End Date:  Prior to discharge or change in level of care    Document on I/O and Assessment flowsheets    1.  Evaluate need to continue indwelling catheter every shift  2.  Assess signs and symptoms of urinary retention  3.  Assess post-void residual volumes  4.  Implement bladder training program  5.  Encourage scheduled voidings  6.  Assist patient to sit on bedside commode or toilet for voiding  7.  Educate patient and family/caregiver on use and purpose of urine collection devices (document in Patient Education)  Outcome: Progressing     Problem: Bowel Elimination  Goal: Establish and maintain regular bowel function  Description: Target End Date:  Prior to discharge or change in level of care    1.   Note date of last BM  2.   Educate about diet, fluid intake, medication and activity to promote bowel function  3.   Educate signs and symptoms of constipation and interventions to implement  4.   Pharmacologic bowel management per provider order  5.   Regular toileting schedule  6.   Upright position for toileting  7.   High fiber diet  8.   Encourage hydration  9.   Collaborate with Clinical Dietician  10. Care and maintenance of ostomy if applicable  Outcome: Progressing     Problem: Mobility - Stroke  Goal: Patient's capacity to carry out activities will improve  Description: Target End Date:  Prior to discharge or change in level of care    1.  Assess for barriers to  mobility/activity  2.  Implement activity per interdisciplinary team recommendations  3.  Target activity level identified and patient/family/caregiver aware of goal  4.  Provide assistive devices  5.  Instruct patient/caregiver on proper use of assistive/adaptive devices  6.  Schedule activities and rest periods to decrease effects of fatigue  7.  Encourage mobilization to extent of ability  8.  Maintain proper body alignment  9.  Provide adequate pain management to allow progressive mobilization  10. Implement pace maker precautions as needed  Outcome: Progressing  Goal: Spasticity will be prevented or improved  Description: Target End Date:  Prior to discharge or change in level of care    1.  Muscle relaxing agents considered or administered per order  2.  Splints applied properly and used accordingly to therapist's recommendations  3.  Assistance with stretching and range of motion exercises provided  Outcome: Progressing  Goal: Subluxation will be prevented or improved  Description: Target End Date:  Prior to discharge or change in level of care    1.   Ensure proper handling during transfers, ambulation, and repositioning in bed  2.   Reduce traction to affected limb and provide adequate support of weight  3.   Perform passive range of motion  4.  Assist with active range of motion  Outcome: Progressing     Problem: Self Care  Goal: Patient will have the ability to perform ADLs independently or with assistance (bathe, groom, dress, toilet and feed)  Description: Target End Date:  Prior to discharge or change in level of care    Document on ADL flowsheet    1.  Assess the capability and level of deficiency to perform ADLs  2.  Encourage family/care giver involvement  3.  Provide assistive devices  4.  Consider PT/OT evaluations  5.  Maintain support, give positive feedback, encourage self-care allowing extra time and verbal cuing as needed  6.  Avoid doing something for patients they can do themselves, but  provide assistance as needed  7.  Assist in anticipating/planning individual needs  8.  Collaborate with Case Management and  to meet discharge needs  Outcome: Progressing     Problem: Skin Integrity  Goal: Skin integrity is maintained or improved  Description: Target End Date:  Prior to discharge or change in level of care    Document interventions on Skin Risk/Raciel flowsheet groups and corresponding LDA    1.  Assess and monitor skin integrity, appearance and/or temperature  2.  Assess risk factors for impaired skin integrity and/or pressures ulcers  3.  Implement precautions to protect skin integrity in collaboration with interdisciplinary team  4.  Implement pressure ulcer prevention protocol if at risk for skin breakdown  5.  Confirm wound care consult if at risk for skin breakdown  6.  Ensure patient use of pressure relieving devices  (Low air loss bed, waffle overlay, heel protectors, ROHO cushion, etc)  Outcome: Progressing     Problem: Pain - Standard  Goal: Alleviation of pain or a reduction in pain to the patient’s comfort goal  Description: Target End Date:  Prior to discharge or change in level of care    Document on Vitals flowsheet    1.  Document pain using the appropriate pain scale per order or unit policy  2.  Educate and implement non-pharmacologic comfort measures (i.e. relaxation, distraction, massage, cold/heat therapy, etc.)  3.  Pain management medications as ordered  4.  Reassess pain after pain med administration per policy  5.  If opiods administered assess patient's response to pain medication is appropriate per POSS sedation scale  6.  Follow pain management plan developed in collaboration with patient and interdisciplinary team (including palliative care or pain specialists if applicable)  Outcome: Progressing     Problem: Optimal Care for Alcohol Withdrawal  Goal: Optimal Care for the alcohol withdrawal patient  Description: Target End Date:  1 to 3 days    1.   Alcohol history screening done on admission  2.  CIWA-AR score assessment (includes assessment of nausea/vomiting, tremor, sweats, anxiety, agitation, tactile, visual and auditory disturbances, headache and orientation/sensorium).  Document on CIWA flowsheet.  3.  Follow CIWA-AR score protocol  4.  Frequent reorientation  5.  Monitor for fluid and electrolyte imbalance.  6.  Assess for respiratory depression due to sedation (pulse ox)  7.  Consider thiamine, multivitamins, folic acid and magnesium sulfate per provider order  8.  Collaborate with Social Workers/Case Management  9.  Collaborate with mental health  Outcome: Progressing     Problem: Seizure Precautions  Goal: Implementation of seizure precautions  Description: Target End Date:  Prior to discharge or change in level of care    1.  Padded side rails up at all times  2.  Suction equipment and oxygen delivery system at bedside  3.  Continuous pulse oximeter in use  4.  Implement fall precautions, bed alarm on, bed in lowest position  5.  IV access (per order)  6.  Provide low stimulus environment, avoid exposure to triggers  7.  Instruct patient to use call light/seizure button if having warning signs of impending seizure  Outcome: Progressing     Problem: Lifestyle Changes  Goal: Patient's ability to identify lifestyle changes and available resources to help reduce recurrence of condition will improve  Description: Target End Date:  1 to 3 days    1.  Discuss recommended lifestyle changes  2.  Identify available resources and support systems  3.  Consider referral to substance abuse program  Outcome: Progressing     Problem: Psychosocial  Goal: Spiritual and cultural needs incorporated into hospitalization  Description: Target End Date:  End of day 1    1.  Encourage verbalization of feelings, concerns, expectations and needs  2.  Collaborate with Case Management/  3.  Collaborate with Pastoral Care to meet spiritual needs  Outcome:  Progressing       Patient is not progressing towards the following goals:

## 2023-02-06 NOTE — DISCHARGE SUMMARY
Discharge Summary    CHIEF COMPLAINT ON ADMISSION  No chief complaint on file.      Reason for Admission  Acute stroke    Admission Date  1/31/2023     CODE STATUS  Full Code    HPI & HOSPITAL COURSE  57-year-old female with history of hypertension and alcoholism presented 1/31 with headache and right facial droop.  Initially patient went to [facility] emergency department and a NIH score was around 5, CT scan for head showed left basal ganglia and thalamic hemorrhagic stroke.  Patient was transferred to our facility for higher level of care, patient was admitted to ICU, nicardipine infusion to control blood pressure was initiated.  Patient was evaluated by intensivist and neurologist on admission, CTA for head did not show any significant stenosis in bilateral internal carotid.  MRI for head with and without contrast showed acute and subacute hemorrhage in the left thalamus and adjust partial corona radiata with mass-effect upon the posterior third ventricle and slight midline shift measured 4 to 5 mm. Neurology recommends SBP goal 100-140 STRICT and Na goal 135-145. She is currently on amlodipine, losartan and clonidine patch for HTN. She is on salt tabs 2gm tid and Na was 135 on the day of discharge.     Of note echo showed PFO versus ASD, right-to-left shunt with no signs of pulmonary hypertension.Case was discussed with neurology team, patient has hemorrhagic stroke related to uncontrolled hypertension, and PFO was not related to the stroke. She is advised to follow-up with cardiology as outpatient, no indication at this time for aspirin or closing.       Patient has history of alcoholism and drinking around 5-7 drinks daily, patient was on Precedex at the ICU and on the floor start with CIWA protocol.      Therefore, she is discharged in good and stable condition to an inpatient rehabilitation hospital.    The patient met 2-midnight criteria for an inpatient stay at the time of discharge.      FOLLOW UP  ITEMS POST DISCHARGE  PCP  Stroke [facility]    DISCHARGE DIAGNOSES  Principal Problem:    Nontraumatic acute hemorrhage of basal ganglia (HCC) POA: Yes  Active Problems:    Hypertensive emergency POA: Yes    Hyponatremia POA: Yes    Alcohol use POA: Unknown    Dyslipidemia POA: Unknown    Hypokalemia POA: Unknown    Hypomagnesemia POA: Unknown    PFO (patent foramen ovale) POA: Unknown    Hypertension POA: Unknown  Resolved Problems:    * No resolved hospital problems. *      FOLLOW UP  No future appointments.  Alissa Betts M.D.  [address] [place] [address] [address] [place] [address]  [phone]    Follow up in 1 week(s)        MEDICATIONS ON DISCHARGE     Medication List        START taking these medications        Instructions   amLODIPine 10 MG Tabs  Start taking on: February 7, 2023  Commonly known as: NORVASC   Take 1 Tablet by mouth every day.  Dose: 10 mg     atorvastatin 10 MG Tabs  Commonly known as: LIPITOR   Take 1 Tablet by mouth every evening.  Dose: 10 mg     cloNIDine 0.1 MG/24HR Ptwk patch  Start taking on: February 10, 2023  Commonly known as: CATAPRES   Place 1 Patch on the skin every 7 days.  Dose: 1 Patch     folic acid 1 MG Tabs  Commonly known as: FOLVITE   Take 1 Tablet by mouth every day.  Dose: 1 mg     losartan 50 MG Tabs  Start taking on: February 7, 2023  Commonly known as: COZAAR   Take 1 Tablet by mouth every day.  Dose: 50 mg     multivitamin Tabs   Take 1 Tablet by mouth every day.  Dose: 1 Tablet     sodium chloride 1 GM Tabs  Commonly known as: SALT   Take 2 Tablets by mouth 3 times a day with meals.  Dose: 2 g     thiamine 100 MG tablet  Commonly known as: THIAMINE   Take 1 Tablet by mouth every day.  Dose: 100 mg            CONTINUE taking these medications        Instructions   Fish Oil Oil   by Does not apply route.              Allergies  No Known Allergies    DIET  Orders Placed This Encounter   Procedures    Diet Order Diet: Level 5 - Minced and Moist (meds in applesauce. hold and  contact SLP with difficulty!); Liquid level: Level 0 - Thin; Fluid modifications: (optional): 1500 ml Fluid Restriction; Tray Modifications (optional): SLP - 1:1 Supervision...     Standing Status:   Standing     Number of Occurrences:   1     Order Specific Question:   Diet:     Answer:   Level 5 - Minced and Moist [24]     Comments:   meds in applesauce. hold and contact SLP with difficulty!     Order Specific Question:   Liquid level     Answer:   Level 0 - Thin     Order Specific Question:   Fluid modifications: (optional)     Answer:   1500 ml Fluid Restriction [9]     Order Specific Question:   Tray Modifications (optional)     Answer:   SLP - 1:1 Supervision by Nursing     Order Specific Question:   Tray Modifications (optional)     Answer:   SLP - Deliver to Nursing Station       ACTIVITY  As tolerated.  Weight bearing as tolerated    LINES, DRAINS, AND WOUNDS  This is an automated list. Peripheral IVs will be removed prior to discharge.  Peripheral IV 02/01/23 20 G Right Forearm (Active)   Site Assessment Clean;Dry;Intact 02/05/23 2045   Dressing Type Transparent 02/05/23 2045   Line Status Scrubbed the hub prior to access;Flushed;Saline locked 02/05/23 2045   Dressing Status Clean;Dry;Intact 02/05/23 2045   Dressing Intervention Dressing changed per protocol 02/04/23 2000   Dressing Change Due 02/11/23 02/04/23 2000   Date Primary Tubing Changed 02/01/23 02/02/23 0800   Date Secondary Tubing Changed 02/02/23 02/02/23 0800   NEXT Primary Tubing Change  02/04/23 02/02/23 0800   NEXT Secondary Tubing Change  02/03/23 02/02/23 0800   Date IV Connector(s) Changed 02/04/23 02/02/23 0800   Infiltration Grading ([facility] 0 02/05/23 2045   Phlebitis Scale ([facility] Only) 0 02/05/23 2045       Peripheral IV 02/02/23 20 G Anterior;Left Forearm (Active)   Site Assessment Clean;Dry;Intact 02/05/23 2045   Dressing Type Transparent 02/05/23 2045   Line Status Scrubbed the hub prior to access;Flushed;Saline locked  02/05/23 2045   Dressing Status Clean;Dry;Intact 02/05/23 2045   Dressing Intervention Dressing changed per protocol 02/04/23 2000   Dressing Change Due 02/11/23 02/04/23 2000   Date Primary Tubing Changed 02/02/23 02/02/23 1800   Date Secondary Tubing Changed 02/02/23 02/02/23 1800   NEXT Primary Tubing Change  02/04/23 02/02/23 1800   NEXT Secondary Tubing Change  02/03/23 02/02/23 1800   Date IV Connector(s) Changed 02/02/23 02/02/23 1800   Infiltration Grading ([facility], [facility]) 0 02/05/23 2045   Phlebitis Scale ([facility] Only) 0 02/05/23 2045          Peripheral IV 02/01/23 20 G Right Forearm (Active)   Site Assessment Clean;Dry;Intact 02/05/23 2045   Dressing Type Transparent 02/05/23 2045   Line Status Scrubbed the hub prior to access;Flushed;Saline locked 02/05/23 2045   Dressing Status Clean;Dry;Intact 02/05/23 2045   Dressing Intervention Dressing changed per protocol 02/04/23 2000   Dressing Change Due 02/11/23 02/04/23 2000   Date Primary Tubing Changed 02/01/23 02/02/23 0800   Date Secondary Tubing Changed 02/02/23 02/02/23 0800   NEXT Primary Tubing Change  02/04/23 02/02/23 0800   NEXT Secondary Tubing Change  02/03/23 02/02/23 0800   Date IV Connector(s) Changed 02/04/23 02/02/23 0800   Infiltration Grading ([facility], [facility]) 0 02/05/23 2045   Phlebitis Scale ([facility] Only) 0 02/05/23 2045       Peripheral IV 02/02/23 20 G Anterior;Left Forearm (Active)   Site Assessment Clean;Dry;Intact 02/05/23 2045   Dressing Type Transparent 02/05/23 2045   Line Status Scrubbed the hub prior to access;Flushed;Saline locked 02/05/23 2045   Dressing Status Clean;Dry;Intact 02/05/23 2045   Dressing Intervention Dressing changed per protocol 02/04/23 2000   Dressing Change Due 02/11/23 02/04/23 2000   Date Primary Tubing Changed 02/02/23 02/02/23 1800   Date Secondary Tubing Changed 02/02/23 02/02/23 1800   NEXT Primary Tubing Change  02/04/23 02/02/23 1800   NEXT Secondary Tubing Change  02/03/23 02/02/23 1800   Date IV  Connector(s) Changed 02/02/23 02/02/23 1800   Infiltration Grading ([facility] 0 02/05/23 2045   Phlebitis Scale ([facility] Only) 0 02/05/23 2045               MENTAL STATUS ON TRANSFER             CONSULTATIONS  Neurology  ICU    PROCEDURES  na    LABORATORY  Lab Results   Component Value Date    SODIUM 135 02/06/2023    POTASSIUM 3.7 02/06/2023    CHLORIDE 105 02/06/2023    CO2 19 (L) 02/06/2023    GLUCOSE 94 02/06/2023    BUN 14 02/06/2023    CREATININE 0.55 02/06/2023    GLOMRATE 85 01/31/2023        Lab Results   Component Value Date    WBC 6.2 02/06/2023    HEMOGLOBIN 13.4 02/06/2023    HEMATOCRIT 38.6 02/06/2023    PLATELETCT 374 02/06/2023      EC-ECHOCARDIOGRAM COMPLETE W/O CONT   Final Result      MR-BRAIN-WITH & W/O   Final Result         Acute-subacute hemorrhage in the left thalamus and adjacent parietal corona radiata with mass effect upon the posterior third ventricle and slight midline shift measuring 4 to 5 mm. Surrounding edema noted which extends to the cerebral peduncle on the    tectum.      No abnormal vascularity or abnormal enhancement is identified in the vicinity of the hemorrhage to suggest an underlying lesion.      Nonspecific T2 hyperintensities are noted in the periventricular and deep white matter, most likely related to chronic microvascular ischemia.      MR-MRA HEAD-W/O   Final Result      Normal intracranial MRA.          Total time of the discharge process 33 minutes.

## 2023-02-06 NOTE — DISCHARGE PLANNING
Dr. Orozco has medically cleared. Insurance remains pending.  Would appreciate updated [place] evals once appropriate.      [id number]-Insurance has authorized.  Will discuss this case with Administration this am for a possible admission.     0954-Case is under review by Dr. Huizar.     1026-Dr. Huizar has accepted.  Transport has been arranged via [facility] gurney between [id number].  Dr. Orozco & Briseida, daughter are aware. Msg placed to Lorene BRADLEY & Erlinda BSN.

## 2023-02-06 NOTE — THERAPY
Occupational Therapy  Daily Treatment     Patient Name: Anali Pan  Age:  57 y.o., Sex:  female  Medical Record #: [id number]  Today's Date: 2/6/2023       Precautions: Fall Risk, Swallow Precautions ( See Comments)      Assessment    Pt seen for OT tx. Continues to be limited by decreased activity tolerance, balance deficits and inattention impacting ability to complete ADLs and ADL transfers independently. RUE w/ limited ROM and poor coordination. Required PROM to complete full elbow extension d/t hypertonicity but able to break up tone w/ gentle ROM. Provided AE to increase independence in self feeding tasks. Mod A self feeding using RUE w/ HHA for support d/t RUE weakness and limited ROM. Pt perseverating on her visual impairments making it difficult to complete ADLs. Min A sit > stand, min A txf from chair > BSC. Will continue to follow while in house.     Plan    O.T. Treatment Plan: Continue Current Treatment Plan    DC Equipment Recommendations: Unable to determine at this time  Discharge Recommendations: Recommend post-acute placement for additional occupational therapy services prior to discharge home       02/06/23 0919   Balance   Sitting Balance (Static) Fair -   Sitting Balance (Dynamic) Fair -   Standing Balance (Static) Poor   Standing Balance (Dynamic) Poor -   Weight Shift Sitting Fair   Weight Shift Standing Poor   Bed Mobility    Comments up in chair pre and post session   Activities of Daily Living   Grooming Moderate Assist;Seated   Upper Body Dressing Moderate Assist   Lower Body Dressing Maximal Assist   Toileting Moderate Assist   Functional Mobility   Sit to Stand Minimal Assist   Bed, Chair, Wheelchair Transfer Minimal Assist   Toilet Transfers Minimal Assist   Short Term Goals   Short Term Goal # 1 pt will groom seated EOB w/ setup   Goal Outcome # 1 Progressing as expected   Short Term Goal # 2 pt will demo ADL txfs with [place]   Goal Outcome # 2 Progressing as expected   Short Term  Goal # 3 pt will demo toileting with supv   Goal Outcome # 3 Goal not met   Anticipated Discharge Equipment and Recommendations   DC Equipment Recommendations Unable to determine at this time   Discharge Recommendations Recommend post-acute placement for additional occupational therapy services prior to discharge home

## 2023-02-06 NOTE — PROGRESS NOTES
Patient admitted to facility at 1230 via wheelchair; accompanied by hospital transport.  Patient assisted to room and positioned in bed for comfort and safety; call light within reach.  Patient assisted with stowing belongings and oriented to room and facility.  Admission assessment performed and documented in computer.  Received and reviewed education binder. Admission paperwork completed; signed copies placed in chart.  Will continue to monitor.

## 2023-02-07 LAB
ALBUMIN SERPL BCP-MCNC: 4 G/DL (ref 3.2–4.9)
ALBUMIN/GLOB SERPL: 1.5 G/DL
ALP SERPL-CCNC: 75 U/L (ref 30–99)
ALT SERPL-CCNC: 16 U/L (ref 2–50)
ANION GAP SERPL CALC-SCNC: 11 MMOL/L (ref 7–16)
AST SERPL-CCNC: 21 U/L (ref 12–45)
BASOPHILS # BLD AUTO: 1.5 % (ref 0–1.8)
BASOPHILS # BLD: 0.09 K/UL (ref 0–0.12)
BILIRUB SERPL-MCNC: 0.4 MG/DL (ref 0.1–1.5)
BUN SERPL-MCNC: 16 MG/DL (ref 8–22)
CALCIUM ALBUM COR SERPL-MCNC: 9.2 MG/DL (ref 8.5–10.5)
CALCIUM SERPL-MCNC: 9.2 MG/DL (ref 8.5–10.5)
CHLORIDE SERPL-SCNC: 103 MMOL/L (ref 96–112)
CO2 SERPL-SCNC: 21 MMOL/L (ref 20–33)
CREAT SERPL-MCNC: 0.54 MG/DL (ref 0.5–1.4)
EOSINOPHIL # BLD AUTO: 0.37 K/UL (ref 0–0.51)
EOSINOPHIL NFR BLD: 6.1 % (ref 0–6.9)
ERYTHROCYTE [DISTWIDTH] IN BLOOD BY AUTOMATED COUNT: 42.3 FL (ref 35.9–50)
GFR SERPLBLD CREATININE-BSD FMLA CKD-EPI: 108 ML/MIN/1.73 M 2
GLOBULIN SER CALC-MCNC: 2.6 G/DL (ref 1.9–3.5)
GLUCOSE SERPL-MCNC: 95 MG/DL (ref 65–99)
HCT VFR BLD AUTO: 39.8 % (ref 37–47)
HGB BLD-MCNC: 13.5 G/DL (ref 12–16)
IMM GRANULOCYTES # BLD AUTO: 0.02 K/UL (ref 0–0.11)
IMM GRANULOCYTES NFR BLD AUTO: 0.3 % (ref 0–0.9)
LYMPHOCYTES # BLD AUTO: 1.24 K/UL (ref 1–4.8)
LYMPHOCYTES NFR BLD: 20.3 % (ref 22–41)
MAGNESIUM SERPL-MCNC: 2.1 MG/DL (ref 1.5–2.5)
MCH RBC QN AUTO: 31.8 PG (ref 27–33)
MCHC RBC AUTO-ENTMCNC: 33.9 G/DL (ref 33.6–35)
MCV RBC AUTO: 93.9 FL (ref 81.4–97.8)
MONOCYTES # BLD AUTO: 0.72 K/UL (ref 0–0.85)
MONOCYTES NFR BLD AUTO: 11.8 % (ref 0–13.4)
NEUTROPHILS # BLD AUTO: 3.66 K/UL (ref 2–7.15)
NEUTROPHILS NFR BLD: 60 % (ref 44–72)
NRBC # BLD AUTO: 0 K/UL
NRBC BLD-RTO: 0 /100 WBC
NT-PROBNP SERPL IA-MCNC: 44 PG/ML (ref 0–125)
PLATELET # BLD AUTO: 398 K/UL (ref 164–446)
PMV BLD AUTO: 9.7 FL (ref 9–12.9)
POTASSIUM SERPL-SCNC: 3.6 MMOL/L (ref 3.6–5.5)
PROT SERPL-MCNC: 6.6 G/DL (ref 6–8.2)
RBC # BLD AUTO: 4.24 M/UL (ref 4.2–5.4)
SODIUM SERPL-SCNC: 135 MMOL/L (ref 135–145)
WBC # BLD AUTO: 6.1 K/UL (ref 4.8–10.8)

## 2023-02-07 PROCEDURE — 700102 HCHG RX REV CODE 250 W/ 637 OVERRIDE(OP): Performed by: PHYSICAL MEDICINE & REHABILITATION

## 2023-02-07 PROCEDURE — 700111 HCHG RX REV CODE 636 W/ 250 OVERRIDE (IP): Performed by: PHYSICAL MEDICINE & REHABILITATION

## 2023-02-07 PROCEDURE — 97116 GAIT TRAINING THERAPY: CPT

## 2023-02-07 PROCEDURE — 80053 COMPREHEN METABOLIC PANEL: CPT

## 2023-02-07 PROCEDURE — 99232 SBSQ HOSP IP/OBS MODERATE 35: CPT | Performed by: PHYSICAL MEDICINE & REHABILITATION

## 2023-02-07 PROCEDURE — 92610 EVALUATE SWALLOWING FUNCTION: CPT

## 2023-02-07 PROCEDURE — 97162 PT EVAL MOD COMPLEX 30 MIN: CPT

## 2023-02-07 PROCEDURE — 97166 OT EVAL MOD COMPLEX 45 MIN: CPT

## 2023-02-07 PROCEDURE — A9270 NON-COVERED ITEM OR SERVICE: HCPCS | Performed by: PHYSICAL MEDICINE & REHABILITATION

## 2023-02-07 PROCEDURE — 36415 COLL VENOUS BLD VENIPUNCTURE: CPT

## 2023-02-07 PROCEDURE — 97535 SELF CARE MNGMENT TRAINING: CPT

## 2023-02-07 PROCEDURE — 83735 ASSAY OF MAGNESIUM: CPT

## 2023-02-07 PROCEDURE — 85025 COMPLETE CBC W/AUTO DIFF WBC: CPT

## 2023-02-07 PROCEDURE — 92523 SPEECH SOUND LANG COMPREHEN: CPT

## 2023-02-07 PROCEDURE — 770010 HCHG ROOM/CARE - REHAB SEMI PRIVAT*

## 2023-02-07 PROCEDURE — 83880 ASSAY OF NATRIURETIC PEPTIDE: CPT

## 2023-02-07 RX ORDER — LOSARTAN POTASSIUM 25 MG/1
25 TABLET ORAL ONCE
Status: COMPLETED | OUTPATIENT
Start: 2023-02-07 | End: 2023-02-07

## 2023-02-07 RX ORDER — HYDRALAZINE HYDROCHLORIDE 10 MG/1
10 TABLET, FILM COATED ORAL EVERY 8 HOURS PRN
Status: DISCONTINUED | OUTPATIENT
Start: 2023-02-07 | End: 2023-02-08

## 2023-02-07 RX ORDER — SODIUM CHLORIDE 1 G/1
1 TABLET ORAL
Status: DISCONTINUED | OUTPATIENT
Start: 2023-02-07 | End: 2023-02-07

## 2023-02-07 RX ORDER — HYDRALAZINE HYDROCHLORIDE 25 MG/1
25 TABLET, FILM COATED ORAL EVERY 8 HOURS PRN
Status: DISCONTINUED | OUTPATIENT
Start: 2023-02-07 | End: 2023-02-24 | Stop reason: HOSPADM

## 2023-02-07 RX ORDER — LOSARTAN POTASSIUM 25 MG/1
75 TABLET ORAL
Status: DISCONTINUED | OUTPATIENT
Start: 2023-02-08 | End: 2023-02-08

## 2023-02-07 RX ORDER — SODIUM CHLORIDE 1 G/1
2 TABLET ORAL
Status: DISCONTINUED | OUTPATIENT
Start: 2023-02-07 | End: 2023-02-13

## 2023-02-07 RX ORDER — ATORVASTATIN CALCIUM 40 MG/1
80 TABLET, FILM COATED ORAL EVERY EVENING
Status: DISCONTINUED | OUTPATIENT
Start: 2023-02-07 | End: 2023-02-24 | Stop reason: HOSPADM

## 2023-02-07 RX ADMIN — SENNOSIDES AND DOCUSATE SODIUM 2 TABLET: 50; 8.6 TABLET ORAL at 08:41

## 2023-02-07 RX ADMIN — ATORVASTATIN CALCIUM 80 MG: 40 TABLET, FILM COATED ORAL at 20:48

## 2023-02-07 RX ADMIN — HYDRALAZINE HYDROCHLORIDE 10 MG: 10 TABLET ORAL at 16:41

## 2023-02-07 RX ADMIN — SODIUM CHLORIDE 2 G: 1 TABLET ORAL at 11:10

## 2023-02-07 RX ADMIN — HYDRALAZINE HYDROCHLORIDE 25 MG: 25 TABLET, FILM COATED ORAL at 13:58

## 2023-02-07 RX ADMIN — LOSARTAN POTASSIUM 50 MG: 25 TABLET, FILM COATED ORAL at 05:33

## 2023-02-07 RX ADMIN — SODIUM CHLORIDE 2 G: 1 TABLET ORAL at 08:40

## 2023-02-07 RX ADMIN — SODIUM CHLORIDE 2 G: 1 TABLET ORAL at 16:42

## 2023-02-07 RX ADMIN — AMLODIPINE BESYLATE 10 MG: 5 TABLET ORAL at 05:32

## 2023-02-07 RX ADMIN — SENNOSIDES AND DOCUSATE SODIUM 2 TABLET: 50; 8.6 TABLET ORAL at 20:49

## 2023-02-07 RX ADMIN — LOSARTAN POTASSIUM 25 MG: 25 TABLET, FILM COATED ORAL at 11:13

## 2023-02-07 RX ADMIN — ENOXAPARIN SODIUM 40 MG: 40 INJECTION SUBCUTANEOUS at 17:14

## 2023-02-07 SDOH — ECONOMIC STABILITY: TRANSPORTATION INSECURITY
IN THE PAST 12 MONTHS, HAS THE LACK OF TRANSPORTATION KEPT YOU FROM MEDICAL APPOINTMENTS OR FROM GETTING MEDICATIONS?: NO

## 2023-02-07 SDOH — ECONOMIC STABILITY: TRANSPORTATION INSECURITY
IN THE PAST 12 MONTHS, HAS LACK OF RELIABLE TRANSPORTATION KEPT YOU FROM MEDICAL APPOINTMENTS, MEETINGS, WORK OR FROM GETTING THINGS NEEDED FOR DAILY LIVING?: NO

## 2023-02-07 ASSESSMENT — BRIEF INTERVIEW FOR MENTAL STATUS (BIMS)
ASKED TO RECALL BLUE: YES, NO CUE REQUIRED
ASKED TO RECALL SOCK: YES, NO CUE REQUIRED
ASKED TO RECALL BLUE: YES, NO CUE REQUIRED
BIMS SUMMARY SCORE: 15
BIMS SUMMARY SCORE: 15
ASKED TO RECALL BED: YES, NO CUE REQUIRED
WHAT MONTH IS IT: ACCURATE WITHIN 5 DAYS
WHAT YEAR IS IT: CORRECT
INITIAL REPETITION OF BED BLUE SOCK - FIRST ATTEMPT: 3
WHAT YEAR IS IT: CORRECT
INITIAL REPETITION OF BED BLUE SOCK - FIRST ATTEMPT: 3
ASKED TO RECALL BED: YES, NO CUE REQUIRED
WHAT MONTH IS IT: ACCURATE WITHIN 5 DAYS
WHAT DAY OF THE WEEK IS IT: CORRECT
WHAT DAY OF THE WEEK IS IT: CORRECT
ASKED TO RECALL SOCK: YES, NO CUE REQUIRED

## 2023-02-07 ASSESSMENT — GAIT ASSESSMENTS
DISTANCE (FEET): 20
GAIT LEVEL OF ASSIST: TOTAL ASSIST X 2
ASSISTIVE DEVICE: PARALLEL BARS
DEVIATION: STEP TO;DECREASED BASE OF SUPPORT

## 2023-02-07 ASSESSMENT — ACTIVITIES OF DAILY LIVING (ADL)
TOILET_TRANSFER_DESCRIPTION: GRAB BAR;INCREASED TIME;SET-UP OF EQUIPMENT;SUPERVISION FOR SAFETY
BED_CHAIR_WHEELCHAIR_TRANSFER_DESCRIPTION: INCREASED TIME;SET-UP OF EQUIPMENT;SUPERVISION FOR SAFETY;VERBAL CUEING;SQUAT PIVOT TRANSFER TO WHEELCHAIR
TOILETING: INDEPENDENT
BED_CHAIR_WHEELCHAIR_TRANSFER_DESCRIPTION: INCREASED TIME;SUPERVISION FOR SAFETY;VERBAL CUEING;INITIAL PREPARATION FOR TASK
TUB_SHOWER_TRANSFER_DESCRIPTION: GRAB BAR;SHOWER BENCH;INCREASED TIME;VERBAL CUEING;SUPERVISION FOR SAFETY

## 2023-02-07 NOTE — THERAPY
"Occupational Therapy   Initial Evaluation     Patient Name: Anali Pan  Age:  57 y.o., Sex:  female  Medical Record #: [id number]  Today's Date: 2/7/2023     Subjective    Pt perseverated on double vision throughout session     Objective       02/07/23 0701   OT Charge Group   Charges Yes   OT Self Care / ADL (Units) 1   OT Evaluation OT Evaluation Mod   OT Total Time Spent   OT Individual Total Time Spent (Mins) 60   Prior Living Situation   Prior Services Home-Independent   Housing / Facility 1 Story House   Steps Into Home 1   Steps In Home 0   Equipment Owned None   Lives with - Patient's Self Care Capacity Alone and Able to Care For Self   Comments 3 adult children, boyfriend, and mom live in area   Prior Level of ADL Function   Self Feeding Independent   Grooming / Hygiene Independent   Bathing Independent   Dressing Independent   Toileting Independent   Prior Level of IADL Function   Medication Management Independent   Laundry Independent   Kitchen Mobility Independent   Finances Independent   Home Management Independent   Shopping Independent   Prior Level Of Mobility Independent Without Device in Community;Independent Without Device in Home;Independent With Steps in Community   Driving / Transportation Driving Independent   Occupation (Pre-Hospital Vocational) Employed Full Time  ()   Leisure Interests   (\"anything outdoors\")   Prior Functioning: Everyday Activities   Self Care Independent   Indoor Mobility (Ambulation) Independent   Stairs Independent   Functional Cognition Independent   Prior Device Use None of the given options   Pain 0 - 10 Group   Pain Rating Scale (NPRS) 0   Cognition    Level of Consciousness Alert   ABS (Agitated Behavior Scale)   Agitated Behavior Scale Performed Yes   Short Attention Span, Easy Distractibility, Inability to Concentrate 2   Impulsive, Impatient, Low Tolerance for Pain or Frustration 1   Uncooperative, Resistant to Care, Demanding 1   Violent and/or " "Threatening Violence Toward People or Property 1   Explosive and/or Unpredictable Anger 1   Rocking, Rubbing, Moaning, Other Self-Stimulating Behavior 1   Pulling at Tubes, Restraints, etc. 1   Wandering from Treatment Area 1   Restlessness, Pacing, Excessive Movement 1   Repetitive Behaviors, Motor and/or Verbal 1   Rapid, Loud or Excessive Talking 1   Sudden Changes of Mood 1   Easily Initiated - Excessive Crying and/or Laughter 1   Self-Abusiveness, Physical and/or Verbal 1   Agitated Behavior Scale Total Score 15   Level of Severity No Agitation   Cognitive Pattern Assessment   Cognitive Pattern Assessment Used BIMS   Brief Interview for Mental Status (BIMS)   Repetition of Three Words (First Attempt) 3   Temporal Orientation: Year Correct   Temporal Orientation: Month Accurate within 5 days   Temporal Orientation: Day Correct   Recall: \"Sock\" Yes, no cue required   Recall: \"Blue\" Yes, no cue required   Recall: \"Bed\" Yes, no cue required   BIMS Summary Score 15   Confusion Assessment Method (CAM)   Is there evidence of an acute change in mental status from the patient's baseline? No   Inattention Behavior present, fluctuates (comes and goes, changes in severity)   Disorganized thinking Behavior not present   Altered level of consciousness Behavior not present   Vision Screen   Vision   (pt reports double vision, further assessment needed)   Passive ROM Upper Body   Passive ROM Upper Body WDL   Active ROM Upper Body   Active ROM Upper Body  X   Dominant Hand Right   Comments RUE limited, further assessment needed   Strength Upper Body   Upper Body Strength  X   Comments RUE limited, further assessment needed   Sensation Upper Body   Upper Extremity Sensation  X   Comments RUE limited, further assessment needed   Upper Body Muscle Tone   Upper Body Muscle Tone  WDL   Balance Assessment   Sitting Balance (Static) Good   Sitting Balance (Dynamic) Fair +   Standing Balance (Static) Fair   Standing Balance (Dynamic) " Fair -   Weight Shift Sitting Good   Weight Shift Standing Poor   Bed Mobility    Supine to Sit Minimal Assist   Sit to Stand Moderate Assist   Scooting Standby Assist   Coordination Upper Body   Coordination X   Fine Motor Coordination RUE limited, further assessment needed   Gross Motor Coordination RUE limited, further assessment needed   Eating   Assistance Needed Set-up / clean-up   Physical Assistance Level No physical assistance   CARE Score - Eating 5   Eating Discharge Goal   Discharge Goal 6   Oral Hygiene   Assistance Needed Set-up / clean-up   Physical Assistance Level No physical assistance   CARE Score - Oral Hygiene 5   Oral Hygiene Discharge Goal   Discharge Goal 6   Shower/Bathe Self   Assistance Needed Physical assistance   Physical Assistance Level 26%-50%   CARE Score - Shower/Bathe Self 3   Shower/Bathe Self Discharge Goal   Discharge Goal 6   Upper Body Dressing   Assistance Needed Physical assistance   Physical Assistance Level 26%-50%   CARE Score - Upper Body Dressing 3   Upper Body Dressing Discharge Goal   Discharge Goal 6   Lower Body Dressing   Assistance Needed Physical assistance   Physical Assistance Level 26%-50%   CARE Score - Lower Body Dressing 3   Lower Body Dressing Discharge Goal   Discharge Goal 6   Putting On/Taking Off Footwear   Assistance Needed Physical assistance   Physical Assistance Level 76% or more   CARE Score - Putting On/Taking Off Footwear 2   Putting On/Taking Off Footwear Discharge Goal   Discharge Goal 6   Toileting Hygiene   Assistance Needed Physical assistance   Physical Assistance Level 51%-75%   CARE Score - Toileting Hygiene 2   Toileting Hygiene Discharge Goal   Discharge Goal 6   Toilet Transfer   Assistance Needed Physical assistance   Physical Assistance Level 25% or less   CARE Score - Toilet Transfer 3   Toilet Transfer Discharge Goal   Discharge Goal 6   Hearing, Speech, and Vision   Ability to Hear Adequate   Ability to See in Adequate Light  Adequate   Expression of Ideas and Wants Without difficulty   Understanding Verbal and Non-Verbal Content Understands   Functional Level of Assist   Eating Supervision   Eating Description Set-up of equipment or meal/tube feeding   Grooming Supervision   Grooming Description Seated in wheelchair at sink;Set-up of equipment;Supervision for safety   Bathing Moderate Assist   Bathing Description Grab bar;Hand held shower;Assit wtih lower extremities;Increased time;Initial preparation for task;Supervision for safety;Verbal cueing   Upper Body Dressing Moderate Assist   Upper Body Dressing Description Assit with threading arms through sleeves;Increased time;Initial preparation for task;Supervision for safety;Verbal cueing;Set-up of equipment   Lower Body Dressing Maximal Assist   Lower Body Dressing Description Set-up of equipment;Supervision for safety;Increased time;Grab bar;Verbal cueing;Initial preparation for task   Toileting Maximal Assist   Toileting Description Grab bar;Increased time;Initial preparation for task;Assist to pull pants up;Assist to pull pants down   Bed, Chair, Wheelchair Transfer Minimal Assist   Bed Chair Wheelchair Transfer Description Increased time;Supervision for safety;Verbal cueing;Initial preparation for task   Toilet Transfers Minimal Assist   Toilet Transfer Description Grab bar;Increased time;Set-up of equipment;Supervision for safety   Tub / Shower Transfers Minimal Assist   Tub Shower Transfer Description Grab bar;Shower bench;Increased time;Verbal cueing;Supervision for safety   Problem List   Problem List Decreased Active Daily Living Skills;Decreased Homemaking Skills;Decreased Upper Extremity Strength Right;Decreased Upper Extremity AROM Right;Decreased Functional Mobility;Impaired Coordination Right Upper Extremity;Impaired Sensation Right Upper Extremity;Impaired Vision;Impaired Postural Control / Balance   Precautions   Precautions Fall Risk   Comments R sarahy   Current  Discharge Plan   Current Discharge Plan Return to Prior Living Situation  (w/ support from family)   Benefit    Therapy Benefit Patient Would Benefit from Inpatient Rehab Occupational Therapy to Maximize [place] with ADLs, IADLs and Functional Mobility.   Interdisciplinary Plan of Care Collaboration   IDT Collaboration with  Physical Therapist;Speech Therapist   Patient Position at End of Therapy Seated;Phone within Reach;Tray Table within Reach;Call Light within Reach   Collaboration Comments CLOF; transition of care   Equipment Needs   Assistive Device / DME Tub Transfer Bench;Grab Bars In Shower / Tub;Grab Bars By Toilet;Shower Chair;Raised Toilet Seat   Adaptive Equipment Long Handled Sponge;Sock Aide;Reacher   Strengths & Barriers   Strengths Able to follow instructions;Alert and oriented;Effective communication skills;Good insight into deficits/needs;Independent prior level of function;Making steady progress towards goals;Manages pain appropriately;Motivated for self care and independence;Pleasant and cooperative;Supportive family;Willingly participates in therapeutic activities   Barriers Decreased endurance;Impaired activity tolerance;Impaired balance;Limited mobility;Hemiparesis       Assessment  Patient is 57 y.o. female with a diagnosis of L hemorrhagic stroke. Pt has past medical history of hypertension noncompliant on antihypertensive medications and alcohol use of approximately 5-6 drinks per day. Pt hospital stay was complicated by high systolic BP and alcohol withdrawals.     During OT eval pt presented w/ R side weakness, impaired vision, impaired balance, and decreased endurance. Pt was I with all ADLs and IADLs prior to CVA. Pt required supervision- max to complete ADLs this morning. Pt reports her 3 adult children, mom, and boyfriend all live near and are able to assit her w/ 24/7 care post d/c. Pt reports her cognition is back to normal and she wants to focus on her her RUE/LE. Pt did not  show any signs of impaired functional cognition during eval. Pt reported L eye has changed in acuity and she is seeing double. Pt was given eye patch to address double vision. Further assessment on vision and occular motor skills is needed.     Plan  Recommend Occupational Therapy  minutes per day 5-7 days per week for 14-17 days for the following treatments:  OT Orthotics Training, OT E Stim Attended, OT Group Therapy, OT Self Care/ADL, OT Cognitive Skill Dev, OT Community Reintegration, OT Manual Ther Technique, OT Neuro Re-Ed/Balance, OT Sensory Int Techniques, OT Therapeutic Activity, OT Evaluation, and OT Therapeutic Exercise.    Passport items to be completed:  Perform bathroom transfers, complete dressing, complete feeding, get ready for the day, prepare a simple meal, participate in household tasks, adapt home for safety needs, demonstrate home exercise program, complete caregiver training     Goals:  Long term and short term goals have been discussed with patient and they are in agreement.    Occupational Therapy Goals (Active)       Problem: Bathing       Dates: Start: 02/07/23         Goal: STG-Within one week, patient will bathe w/ min A.        Dates: Start: 02/07/23               Problem: Dressing       Dates: Start: 02/07/23         Goal: STG-Within one week, patient will dress UB w/ supervision.        Dates: Start: 02/07/23            Goal: STG-Within one week, patient will dress LB w/ min A.        Dates: Start: 02/07/23               Problem: Functional Transfers       Dates: Start: 02/07/23         Goal: STG-Within one week, patient will transfer to toilet w/ CGA.       Dates: Start: 02/07/23            Goal: STG-Within one week, patient will transfer to step in shower w/ CGA.       Dates: Start: 02/07/23               Problem: OT Long Term Goals       Dates: Start: 02/07/23         Goal: LTG-By discharge, patient will complete basic self care tasks w/ mod I - min A.       Dates: Start:  02/07/23            Goal: LTG-By discharge, patient will perform bathroom transfers w/ mod I - min A.       Dates: Start: 02/07/23               Problem: Toileting       Dates: Start: 02/07/23         Goal: STG-Within one week, patient will complete toileting tasks w/ min.       Dates: Start: 02/07/23

## 2023-02-07 NOTE — CARE PLAN
Problem: Risk for Aspiration  Goal: Patient's risk for aspiration will be absent or decrease  Outcome: Progressing  Note: Patient's food has been upgraded to regular texture, with a thin liquid wash. No s/s of swallow problems this shift.      Problem: Mobility  Goal: Patient's capacity to carry out activities will improve  Note: Patient demonstrates good safety technique this shift.  Asks for assistance when needed and does not attempt self transfer.  Able to verbalize needs.

## 2023-02-07 NOTE — FLOWSHEET NOTE
02/06/23 1740   Protocol Assessment   Initial Assessment Yes   Patient History   Pulmonary Diagnosis None   Procedures Relevant to Respiratory Status None   Home O2 No   Nocturnal CPAP No   Home Treatments/Frequency No   Protocol Pathways   Protocol Pathways None

## 2023-02-07 NOTE — PROGRESS NOTES
"Rehab Progress Note     Date of Service: 2/7/2023  Chief Complaint: follow up stroke    Interval Events (Subjective)    Patient seen and examined today in the therapy gym.   She is walking with PT in the parallel bars, and then in the hallway using the railing.  We reviewed her brain imaging.  She continues to have high blood pressure, denies any headache.  Low PVRs last BM 2/5.     Patient has no other new questions, concerns, or complaints today.     ROS: No changes to bowel, bladder, pain, mood, or sleep.       Objective:  VITAL SIGNS: BP (!) 172/105   Pulse 80   Temp 36.6 °C (97.8 °F) (Oral)   Resp 18   Ht 1.676 m (5' 6\")   Wt 80.2 kg (176 lb 14.4 oz)   LMP  (LMP Unknown)   SpO2 98%   BMI 28.55 kg/m²   Gen: alert, no apparent distress  CV: Regular rate, regular rhythm  Resp: Clear to auscultation bilaterally  Neuro: right hemiparesis, right hemianesthesia, hyperreflexic on right, +clonus, goes into knee extensor tone and ankle supination with ambulation    Recent Results (from the past 72 hour(s))   CBC without Differential    Collection Time: 02/05/23  4:59 AM   Result Value Ref Range    WBC 5.6 4.8 - 10.8 K/uL    RBC 4.18 (L) 4.20 - 5.40 M/uL    Hemoglobin 13.4 12.0 - 16.0 g/dL    Hematocrit 39.2 37.0 - 47.0 %    MCV 93.8 81.4 - 97.8 fL    MCH 32.1 27.0 - 33.0 pg    MCHC 34.2 33.6 - 35.0 g/dL    RDW 42.9 35.9 - 50.0 fL    Platelet Count 362 164 - 446 K/uL    MPV 9.3 9.0 - 12.9 fL   Magnesium    Collection Time: 02/05/23  4:59 AM   Result Value Ref Range    Magnesium 2.1 1.5 - 2.5 mg/dL   Basic Metabolic Panel    Collection Time: 02/05/23  4:59 AM   Result Value Ref Range    Sodium 135 135 - 145 mmol/L    Potassium 3.5 (L) 3.6 - 5.5 mmol/L    Chloride 102 96 - 112 mmol/L    Co2 22 20 - 33 mmol/L    Glucose 114 (H) 65 - 99 mg/dL    Bun 14 8 - 22 mg/dL    Creatinine 0.52 0.50 - 1.40 mg/dL    Calcium 9.4 8.5 - 10.5 mg/dL    Anion Gap 11.0 7.0 - 16.0   ESTIMATED GFR    Collection Time: 02/05/23  4:59 AM "   Result Value Ref Range    GFR (CKD-EPI) 109 >60 mL/min/1.73 m 2   CBC without Differential    Collection Time: 02/06/23  2:24 AM   Result Value Ref Range    WBC 6.2 4.8 - 10.8 K/uL    RBC 4.18 (L) 4.20 - 5.40 M/uL    Hemoglobin 13.4 12.0 - 16.0 g/dL    Hematocrit 38.6 37.0 - 47.0 %    MCV 92.3 81.4 - 97.8 fL    MCH 32.1 27.0 - 33.0 pg    MCHC 34.7 33.6 - 35.0 g/dL    RDW 41.7 35.9 - 50.0 fL    Platelet Count 374 164 - 446 K/uL    MPV 9.2 9.0 - 12.9 fL   Magnesium    Collection Time: 02/06/23  2:24 AM   Result Value Ref Range    Magnesium 2.0 1.5 - 2.5 mg/dL   Basic Metabolic Panel    Collection Time: 02/06/23  2:24 AM   Result Value Ref Range    Sodium 135 135 - 145 mmol/L    Potassium 3.7 3.6 - 5.5 mmol/L    Chloride 105 96 - 112 mmol/L    Co2 19 (L) 20 - 33 mmol/L    Glucose 94 65 - 99 mg/dL    Bun 14 8 - 22 mg/dL    Creatinine 0.55 0.50 - 1.40 mg/dL    Calcium 9.3 8.5 - 10.5 mg/dL    Anion Gap 11.0 7.0 - 16.0   ESTIMATED GFR    Collection Time: 02/06/23  2:24 AM   Result Value Ref Range    GFR (CKD-EPI) 107 >60 mL/min/1.73 m 2   COV-2, FLU A/B, AND RSV BY PCR (2-4 HOURS CEPHEID): Collect NP swab in VTM    Collection Time: 02/06/23  1:40 PM    Specimen: Respirate   Result Value Ref Range    Influenza virus A RNA Negative Negative    Influenza virus B, PCR Negative Negative    RSV, PCR Negative Negative    SARS-CoV-2 by PCR NotDetected     SARS-CoV-2 Source NP Swab    CBC with Differential    Collection Time: 02/07/23  5:41 AM   Result Value Ref Range    WBC 6.1 4.8 - 10.8 K/uL    RBC 4.24 4.20 - 5.40 M/uL    Hemoglobin 13.5 12.0 - 16.0 g/dL    Hematocrit 39.8 37.0 - 47.0 %    MCV 93.9 81.4 - 97.8 fL    MCH 31.8 27.0 - 33.0 pg    MCHC 33.9 33.6 - 35.0 g/dL    RDW 42.3 35.9 - 50.0 fL    Platelet Count 398 164 - 446 K/uL    MPV 9.7 9.0 - 12.9 fL    Neutrophils-Polys 60.00 44.00 - 72.00 %    Lymphocytes 20.30 (L) 22.00 - 41.00 %    Monocytes 11.80 0.00 - 13.40 %    Eosinophils 6.10 0.00 - 6.90 %    Basophils 1.50  0.00 - 1.80 %    Immature Granulocytes 0.30 0.00 - 0.90 %    Nucleated RBC 0.00 /100 WBC    Neutrophils (Absolute) 3.66 2.00 - 7.15 K/uL    Lymphs (Absolute) 1.24 1.00 - 4.80 K/uL    Monos (Absolute) 0.72 0.00 - 0.85 K/uL    Eos (Absolute) 0.37 0.00 - 0.51 K/uL    Baso (Absolute) 0.09 0.00 - 0.12 K/uL    Immature Granulocytes (abs) 0.02 0.00 - 0.11 K/uL    NRBC (Absolute) 0.00 K/uL   Comp Metabolic Panel (CMP)    Collection Time: 02/07/23  5:41 AM   Result Value Ref Range    Sodium 135 135 - 145 mmol/L    Potassium 3.6 3.6 - 5.5 mmol/L    Chloride 103 96 - 112 mmol/L    Co2 21 20 - 33 mmol/L    Anion Gap 11.0 7.0 - 16.0    Glucose 95 65 - 99 mg/dL    Bun 16 8 - 22 mg/dL    Creatinine 0.54 0.50 - 1.40 mg/dL    Calcium 9.2 8.5 - 10.5 mg/dL    AST(SGOT) 21 12 - 45 U/L    ALT(SGPT) 16 2 - 50 U/L    Alkaline Phosphatase 75 30 - 99 U/L    Total Bilirubin 0.4 0.1 - 1.5 mg/dL    Albumin 4.0 3.2 - 4.9 g/dL    Total Protein 6.6 6.0 - 8.2 g/dL    Globulin 2.6 1.9 - 3.5 g/dL    A-G Ratio 1.5 g/dL   Magnesium    Collection Time: 02/07/23  5:41 AM   Result Value Ref Range    Magnesium 2.1 1.5 - 2.5 mg/dL   proBrain Natriuretic Peptide, NT    Collection Time: 02/07/23  5:41 AM   Result Value Ref Range    NT-proBNP 44 0 - 125 pg/mL   CORRECTED CALCIUM    Collection Time: 02/07/23  5:41 AM   Result Value Ref Range    Correct Calcium 9.2 8.5 - 10.5 mg/dL   ESTIMATED GFR    Collection Time: 02/07/23  5:41 AM   Result Value Ref Range    GFR (CKD-EPI) 108 >60 mL/min/1.73 m 2       Scheduled Medications   Medication Dose Frequency    [START ON 2/8/2023] losartan  75 mg Q DAY    sodium chloride  1 g TID WITH MEALS    amLODIPine  10 mg Q DAY    atorvastatin  10 mg Q EVENING    [START ON 2/10/2023] cloNIDine  1 Patch Q7 DAYS    enoxaparin (LOVENOX) injection  40 mg DAILY AT 1800    senna-docusate  2 Tablet BID       Current Diet Order   Procedures    Diet Order Diet: Regular (Meds one at a time with liquid wash, larger pills in applesauce);  Fluid modifications: (optional): 1500 ml Fluid Restriction       Assessment:    This patient is a 57 y.o. female admitted for acute inpatient rehabilitation with Hemorrhagic stroke (HCC).    Problem List/Medical Decision Making and Plan:    Left basal hemorrhagic ganglia stroke  From uncontrolled hypertension  Right hemiparesis  Right sarahy-anesthesia  Cognitive impairment  Continue full rehab program  PT/OT/SLP, 1 hr each discipline, 5 days per week    Monitor for developing spasticity    Outpatient follow up with stroke bridge clinic, Dr. 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  Physical Medicine and Rehabilitation

## 2023-02-07 NOTE — THERAPY
"Speech Language Pathology   Initial Assessment     Patient Name: Anali Pan  AGE:  57 y.o., SEX:  female  Medical Record #: [id number]  Today's Date: 2/7/2023     Subjective    Per H&P: \"The patient is a 57 y.o.  female with a past medical history of hypertension noncompliant on antihypertensive medications and alcohol use of approximately 5-6 drinks per day;  who presented on 1/31/2023 11:17 PM as a transfer from [facility] with right-sided weakness.  Per documentation, patient had acute onset right-sided weakness and difficulty speaking, she arrived at the outside hospital with SBP up to 200.  A CT head was obtained which showed an acute left thalamic hemorrhage patient was transferred to [facility] for higher level of care.  Patient was admitted to the ICU with strict SBP goal of 100-1 40.  She was placed on a nicardipine drip for blood pressure control.  An MRI brain was obtained with evidence of an acute/subacute hemorrhage in the left thalamus adjacent to the parietal corona radiata with mass effect upon the posterior third ventricle with slight midline shift measuring 4 to 5 mm there is no evidence of abnormality relating to underlying lesion or abnormal vascularity.  Patient's hospital course has been complicated by onset of alcohol withdrawal, patient had poor participation with therapy due to anxiety and unwillingness to complete full commands for therapy session.  Patient is on a Precedex drip for agitation.  Echocardiogram was obtained with an EF of 70%, grade 1 diastolic dysfunction with evidence right right to left shift/PFO.\"    Patient was referred for cognitive-linguistic and bedside swallowing evaluation. She was tolerating 5- Minced & Moist 0-Thin liquids in hospital with medications floated in puree. She denies history of dysphagia prior to this hospitalization. Today she reports, \"I feel back to normal in my head, which is great.\"      Objective       02/07/23 0801 " "  Evaluation Charges   SLP Speech Language Evaluation Speech Sound Language Comprehension   SLP Oral Pharyngeal Evaluation Oral Pharyngeal Evaluation   SLP Total Time Spent   SLP Individual Total Time Spent (Mins) 60   Prior Living Situation   Prior Services Home-Independent   Housing / Facility 1 Story House   Lives with - Patient's Self Care Capacity Alone and Able to Care For Self   Prior Level Of Function   Communication Within Functional Limits   Swallow Within Functional Limits   Dentition Intact   Dentures None   Hearing Within Functional Limits for Evaluation   Hearing Aid None   Vision Wears Corrective Lenses;Reading ;Distance  (Also has contacts)   Patient's Primary Language English   Education High School Graduate or GED   Occupation (Pre-Hospital Vocational) Employed Full Time  ()   Receptive Language / Auditory Comprehension   Receptive Language / Auditory Comprehension X   Follows Two Unit Commands Moderate (3)   Expressive Language   Expressive Language (WDL) X   Word Finding Deficits Minimal (4)   Reading Comprehension    Reading Comprehension (WDL) X   Following Written Direction Minimal (4)   Barriers to Reading   (Blurry vision, Impaired vision out of R eye)   Written Language Expression   Written Language Expression (WDL) X   Dominant Hand Right;Using Non-Dominant Hand   Legibility Minimal (4)   Cognition   Cognitive-Linguistic (WDL) X   Moderate Attention Moderate (3)   Complex Information Processing Moderate (3)   Functional Memory Activities Moderate (3)   Functional Problem Solving Moderate (3)   Insight into Deficits Moderate (3)   Cognitive Pattern Assessment   Cognitive Pattern Assessment Used BIMS   Brief Interview for Mental Status (BIMS)   Repetition of Three Words (First Attempt) 3   Temporal Orientation: Year Correct   Temporal Orientation: Month Accurate within 5 days   Temporal Orientation: Day Correct   Recall: \"Sock\" Yes, no cue required   Recall: \"Blue\" Yes, no cue " "required   Recall: \"Bed\" Yes, no cue required   BIMS Summary Score 15   Confusion Assessment Method (CAM)   Is there evidence of an acute change in mental status from the patient's baseline? No   Inattention Behavior present, fluctuates (comes and goes, changes in severity)   Disorganized thinking Behavior present, fluctuates (comes and goes, changes in severity)   Altered level of consciousness Behavior not present   Tracheostomy   Tracheostomy No   Speech Mechanisms / Voice Production   Speech Mechanisms / Voice Production (WDL) WDL   Labial Function   Labial Function (WDL) X   Frown, Pucker Minimal   Lingual Function   Lingual Function (WDL) WDL   Jaw Function   Jaw Function (WDL) WDL   Velar Function   Velar Function (WDL) WDL   Laryngeal Function   Laryngeal Function (WDL) WDL   Swallowing   Swallowing (WDL) X   Masticated Foods Minimal   Dysphagia Strategies / Recommendations   Strategies / Interventions Recommended (Yes / No) Yes   Diet / Liquid Recommendation Regular (7);Thin (0)   Therapy Interventions Dysphagia Therapy By Speech Language Pathologist   Barriers To Oral Feeding   Barriers To Oral Feeding Impaired Cognition / Attention   Swallowing/Nutritional Status   Swallowing/Nutritional Status Regular food   Eating   Assistance Needed Set-up / clean-up   Physical Assistance Level 25% or less   Comment Using non-dominant hand   CARE Score - Eating 3   Functional Level of Assist   Eating Minimal Assist   Eating Description Checking for pocketing of food   Comprehension Minimal Assist   Comprehension Description Glasses;Verbal cues   Expression Minimal Assist   Social Interaction Modified Independent   Social Interaction Description Increased time;Schedule;Verbal cues   Problem Solving Minimal Assist   Problem Solving Description Bed/chair alarm;Increased time;Seat belt;Therapy schedule;Verbal cueing   Memory Minimal Assist   Memory Description Therapy schedule;Seat belt;Bed/chair alarm   Outcome Measures "   Outcome Measures Utilized SCCAN   SCCAN (Scales of Cognitive and Communicative Ability for Neurorehabilitation)   Oral Expression - Raw Score 12   Oral Expression - Scale Performance Score 63   Orientation - Raw Score 12   Orientation - Scale Performance Score 100   Memory - Raw Score 14   Memory - Scale Performance Score 74   Speech Comprehension - Raw Score 11   Speech Comprehension - Scale Performance Score 85   Problem Solving - Raw Score 16   Problem Solving - Scale Performance Score 70   Problem List   Problem List Receptive Language Deficit;Expressive Language Deficit;Cognitive-Linguistic Deficits   Current Discharge Plan   Current Discharge Plan Return to Prior Living Situation   Benefit   Therapy Benefit Patient would benefit from Inpatient Rehab Speech-Language Pathology to address above identified deficits.   Interdisciplinary Plan of Care Collaboration   IDT Collaboration with  Occupational Therapist   Patient Position at End of Therapy Seated;Chair Alarm On;Call Light within Reach;Phone within Reach   Strengths & Barriers   Strengths Alert and oriented;Independent prior level of function;Supportive family;Pleasant and cooperative   Barriers Impaired insight/denial of deficits;Hemiplegia   Speech Language Pathologist Assigned   Assigned SLP / Extension CL/MP 60       Assessment    Patient is 57 y.o. female with a diagnosis of hemorrhagic stroke.  Additional factors influencing patient status/progress (ie: cognitive factors, co-morbidities, social support, etc): decreased insight into deficits.      Bedside swallowing evaluation completed. Patient demonstrates improving right-sided facial droop at rest, with ongoing decreased sensation on R side. Minimally reduced right-side movement with frown/pucker noted. Appropriate oral bolus stripping and containment via spoon, straw, and cup. No anterior loss of bolus noted. Adquate and timely mastication of regular solids, limited to a small amount of patient  reports she is too full to keep eating. Able to clear granola bar bolus from oral cavity, one swallow per bolus. She follows directions to check for right-sided residue with a lingual sweep. Given single and sequential straw and cup sips of thins, patient demonstrates good outward tolerance and denies any difficulty. No overt s/s of aspiration. With RN, she tolerated 1 pill at a time with liquid wash with no overt s/s of aspiration, denies any difficulty or globus sensation.     Cognitive evaluation initiated via SCCAN, incomplete due to time constraints and will be completed tomorrow. Subtests that were completed include: oral expression- 63% (moderate impairment), orientation- 100%, memory- 74% (mild impairment), speech comprehension- 85% (mild impairment), problem solving- 70% (mild impairment). Informally, she is mildly repetitive in conversation, with decreased insight into ongoing deficits. Speech is clear and intelligible, and voice is WFL.     Plan  Recommend Speech Therapy 30-60 minutes per day 5-6 days per week for 3 weeks for the following treatments:  SLP Speech Language Treatment, SLP Swallowing Dysfunction Treatment, and SLP Cognitive Skill Development.    Passport items to be completed:  Express basic needs, understand food/liquid recommendations, consistently follow swallow precautions, manage finances, manage medications, arrive to therapy appointments on time, complete daily memory log entries, solve problems related to safety situations, review education related to hospitalization, complete caregiver training     Goals:  Long term and short term goals have been discussed with patient and they are in agreement.    Speech Therapy Problems (Active)       Problem: Comprehension STGs       Dates: Start: 02/07/23         Goal: STG-Within one week, patient will follow 2-step directions with 80% accuracy given min A       Dates: Start: 02/07/23               Problem: Expression STGs       Dates: Start:  02/07/23         Goal: STG-Within one week, patient will complete word-finding tasks (generative naming, categories) with 80% accuracy given min A       Dates: Start: 02/07/23               Problem: Memory STGs       Dates: Start: 02/07/23         Goal: STG-Within one week, patient will demonstrate delayed recall of new information with 80% accuracy with min A       Dates: Start: 02/07/23               Problem: Problem Solving STGs       Dates: Start: 02/07/23         Goal: STG-Within one week, patient will demonstrate sustained or alternating attention during structured tasks with 80% accuracy given min A        Dates: Start: 02/07/23               Problem: Speech/Swallowing LTGs       Dates: Start: 02/07/23         Goal: LTG-By discharge, patient will tolerate least restrictive diet with no s/s of aspiration        Dates: Start: 02/07/23            Goal: LTG-By discharge, patient will comprehend verbal and written information with 90% accuracy with mod I       Dates: Start: 02/07/23            Goal: LTG-By discharge, patient will express wants, needs, thoughts, and ideas with 90% accuracy with mod I       Dates: Start: 02/07/23            Goal: LTG-By discharge, patient will solve complex problems in structured and functional situations with 90% accuracy with spv        Dates: Start: 02/07/23               Problem: Swallowing STGs       Dates: Start: 02/07/23         Goal: STG-Within one week, patient will tolerated upgraded 7- Regular Textures  0-Thin liquids  diet with no s/s of aspiration        Dates: Start: 02/07/23

## 2023-02-07 NOTE — CARE PLAN
Safety measures enforced, bed at lowest level , call light within easy reach, three side rails up, bed alarm and strip alarm on, reinforced importance of calling for assistance at all times.  Verbalized understanding.Pain under control.Maintained on 1.5 liter fluid restriction.    Problem: Hemodynamics  Goal: Patient's hemodynamics, fluid balance and neurologic status will be stable or improve  Outcome: Progressing  Note: New admit, plan of care explained, vital signs stable, Covid test negative, pt hemodynamically stable.Right arm weak, with slight right facial droop, conversant , answer questions appropriately.      Problem: Risk for Aspiration  Goal: Patient's risk for aspiration will be absent or decrease  Outcome: Progressing  Note: Aspiration precautions observed, wall suction set up ,use of  yankauer sucton explained , verbalized understanding. Head of bed elevated during med pass.   The patient is Watcher - Medium risk of patient condition declining or worsening     Progress made toward(s) clinical / shift goals:  Pt free from fall and injury.

## 2023-02-07 NOTE — PROGRESS NOTES
4 Eyes Skin Assessment Completed by DOT Velázquez and DOT Correa.    Head WDL  Ears WDL  Nose WDL  Mouth WDL  Neck WDL  Breast/Chest WDL  Shoulder Blades WDL  Spine WDL  (R) Arm/Elbow/Hand WDL  (L) Arm/Elbow/Hand WDL  Abdomen WDL  Groin WDL  Scrotum/Coccyx/Buttocks WDL  (R) Leg WDL  (L) Leg WDL  (R) Heel/Foot/Toe WDL  (L) Heel/Foot/Toe WDL          Devices In Places       Interventions In Place N/A    Possible Skin Injury No    Pictures Uploaded Into Epic N/A  Wound Consult Placed N/A  RN Wound Prevention Protocol Ordered No

## 2023-02-07 NOTE — FLOWSHEET NOTE
02/06/23 1741   Events/Summary/Plan   Events/Summary/Plan RT Assessment   Vital Signs   Pulse 68   Respiration 16   Pulse Oximetry 98 %   $ Pulse Oximetry (Spot Check) Yes   Respiratory Assessment   Respiratory Pattern Within Normal Limits   Level of Consciousness Alert   Chest Exam   Work Of Breathing / Effort Within Normal Limits   Oxygen   O2 Delivery Device None - Room Air   Smoking History   Have you ever smoked Never

## 2023-02-07 NOTE — DISCHARGE PLANNING
CASE MANAGEMENT INITIAL ASSESSMENT    Admit Date:  2/6/2023     I met with patient to discuss role of case management / discharge planning / team conference.   Patient is a  57 y.o. female transferred from outside hospital.    Diagnosis: Nontraumatic acute hemorrhage of basal ganglia (HCC) [I61.9]  Hemorrhagic stroke (HCC) [I61.9]    Co-morbidities:   Patient Active Problem List    Diagnosis Date Noted    Hemorrhagic stroke (HCC) 02/06/2023    Hypertension 02/05/2023    PFO (patent foramen ovale) 02/03/2023    Hypomagnesemia 02/02/2023    Alcohol use 02/01/2023    Dyslipidemia 02/01/2023    Hypokalemia 02/01/2023    Nontraumatic acute hemorrhage of basal ganglia (HCC) 01/31/2023    Hyponatremia 04/05/2019    HPV (human papilloma virus) infection 05/26/2016    Hypertensive emergency 03/22/2016     Prior Living Situation:  Housing / Facility: [address]  Lives with - Patient's Self Care Capacity: Alone and Able to Care For Self    Prior Level of Function:  Medication Management: Independent  Finances: Independent  Home Management: Independent  Shopping: Independent  Prior Level Of Mobility: Independent Without Device in Community, Independent Without Device in Home, Independent With Steps in Community  Driving / Transportation: Driving Independent    Support Systems:  Primary : Missy Pan-daughter: [phone]  Other support systems: Briseida Pan-daughter: [phone]       Previous Services Utilized:   Equipment Owned: None  Prior Services: Home-Independent    Other Information:  Occupation (Pre-Hospital Vocational): Employed Full Time ()     Primary Payor Source: Other (Comments)  Primary Care Practitioner : Alissa Betts MD    Patient / Family Goal:  Patient / Family Goal: 1. Stop drinking 2. Gain strength    Plan:  1. Continue to follow patient through hospitalization and provide discharge planning in collaboration with patient, family, physicians and ancillary services.     2.  Utilize community resources to ensure a safe discharge.

## 2023-02-08 DIAGNOSIS — I61.9 HEMORRHAGIC STROKE (HCC): ICD-10-CM

## 2023-02-08 PROCEDURE — 99232 SBSQ HOSP IP/OBS MODERATE 35: CPT | Performed by: PHYSICAL MEDICINE & REHABILITATION

## 2023-02-08 PROCEDURE — A9270 NON-COVERED ITEM OR SERVICE: HCPCS | Performed by: PHYSICAL MEDICINE & REHABILITATION

## 2023-02-08 PROCEDURE — 97530 THERAPEUTIC ACTIVITIES: CPT

## 2023-02-08 PROCEDURE — 770010 HCHG ROOM/CARE - REHAB SEMI PRIVAT*

## 2023-02-08 PROCEDURE — 700111 HCHG RX REV CODE 636 W/ 250 OVERRIDE (IP): Performed by: PHYSICAL MEDICINE & REHABILITATION

## 2023-02-08 PROCEDURE — 97116 GAIT TRAINING THERAPY: CPT

## 2023-02-08 PROCEDURE — 97112 NEUROMUSCULAR REEDUCATION: CPT

## 2023-02-08 PROCEDURE — 700102 HCHG RX REV CODE 250 W/ 637 OVERRIDE(OP): Performed by: PHYSICAL MEDICINE & REHABILITATION

## 2023-02-08 PROCEDURE — 97130 THER IVNTJ EA ADDL 15 MIN: CPT

## 2023-02-08 PROCEDURE — 97129 THER IVNTJ 1ST 15 MIN: CPT

## 2023-02-08 PROCEDURE — 97535 SELF CARE MNGMENT TRAINING: CPT

## 2023-02-08 PROCEDURE — 92526 ORAL FUNCTION THERAPY: CPT

## 2023-02-08 RX ORDER — BISACODYL 10 MG
10 SUPPOSITORY, RECTAL RECTAL
Status: DISCONTINUED | OUTPATIENT
Start: 2023-02-08 | End: 2023-02-24 | Stop reason: HOSPADM

## 2023-02-08 RX ORDER — AMOXICILLIN 250 MG
2 CAPSULE ORAL 2 TIMES DAILY
Status: DISCONTINUED | OUTPATIENT
Start: 2023-02-08 | End: 2023-02-24 | Stop reason: HOSPADM

## 2023-02-08 RX ORDER — HYDRALAZINE HYDROCHLORIDE 10 MG/1
10 TABLET, FILM COATED ORAL EVERY 8 HOURS
Status: DISCONTINUED | OUTPATIENT
Start: 2023-02-08 | End: 2023-02-08

## 2023-02-08 RX ORDER — POLYETHYLENE GLYCOL 3350 17 G/17G
1 POWDER, FOR SOLUTION ORAL DAILY
Status: DISCONTINUED | OUTPATIENT
Start: 2023-02-08 | End: 2023-02-24 | Stop reason: HOSPADM

## 2023-02-08 RX ORDER — HYDRALAZINE HYDROCHLORIDE 25 MG/1
25 TABLET, FILM COATED ORAL EVERY 8 HOURS
Status: DISCONTINUED | OUTPATIENT
Start: 2023-02-08 | End: 2023-02-10

## 2023-02-08 RX ORDER — LOSARTAN POTASSIUM 25 MG/1
100 TABLET ORAL
Status: DISCONTINUED | OUTPATIENT
Start: 2023-02-09 | End: 2023-02-24 | Stop reason: HOSPADM

## 2023-02-08 RX ADMIN — ENOXAPARIN SODIUM 40 MG: 40 INJECTION SUBCUTANEOUS at 17:46

## 2023-02-08 RX ADMIN — SENNOSIDES AND DOCUSATE SODIUM 2 TABLET: 50; 8.6 TABLET ORAL at 20:28

## 2023-02-08 RX ADMIN — AMLODIPINE BESYLATE 10 MG: 5 TABLET ORAL at 05:34

## 2023-02-08 RX ADMIN — MAGNESIUM HYDROXIDE 30 ML: 1200 LIQUID ORAL at 13:26

## 2023-02-08 RX ADMIN — HYDRALAZINE HYDROCHLORIDE 25 MG: 25 TABLET, FILM COATED ORAL at 20:28

## 2023-02-08 RX ADMIN — SODIUM CHLORIDE 2 G: 1 TABLET ORAL at 08:47

## 2023-02-08 RX ADMIN — HYDRALAZINE HYDROCHLORIDE 10 MG: 10 TABLET ORAL at 00:23

## 2023-02-08 RX ADMIN — POLYETHYLENE GLYCOL 3350 1 PACKET: 17 POWDER, FOR SOLUTION ORAL at 09:33

## 2023-02-08 RX ADMIN — SODIUM CHLORIDE 2 G: 1 TABLET ORAL at 11:26

## 2023-02-08 RX ADMIN — LOSARTAN POTASSIUM 75 MG: 25 TABLET, FILM COATED ORAL at 05:35

## 2023-02-08 RX ADMIN — SENNOSIDES AND DOCUSATE SODIUM 2 TABLET: 50; 8.6 TABLET ORAL at 08:47

## 2023-02-08 RX ADMIN — SODIUM CHLORIDE 2 G: 1 TABLET ORAL at 17:46

## 2023-02-08 RX ADMIN — HYDRALAZINE HYDROCHLORIDE 10 MG: 10 TABLET ORAL at 09:33

## 2023-02-08 RX ADMIN — HYDRALAZINE HYDROCHLORIDE 10 MG: 10 TABLET ORAL at 13:26

## 2023-02-08 RX ADMIN — ATORVASTATIN CALCIUM 80 MG: 40 TABLET, FILM COATED ORAL at 20:14

## 2023-02-08 ASSESSMENT — GAIT ASSESSMENTS
GAIT LEVEL OF ASSIST: TOTAL ASSIST X 2
DISTANCE (FEET): 15
ASSISTIVE DEVICE: FRONT WHEEL WALKER
DEVIATION: STEP TO;DECREASED BASE OF SUPPORT

## 2023-02-08 NOTE — THERAPY
"Physical Therapy   Initial Evaluation     Patient Name: Anali Pan  Age:  57 y.o., Sex:  female  Medical Record #: [id number]  Today's Date: 2/7/2023     Subjective    \"I just want to get up and move. I hate sitting around all day\"  \"I don't know why my blood pressure is still so high\"     Objective       02/07/23 1330   PT Charge Group   PT Gait Training (Units) 1   PT Evaluation PT Evaluation Mod   PT Total Time Spent   PT Individual Total Time Spent (Mins) 60   Prior Living Situation   Prior Services Home-Independent   Housing / Facility 1 Story House   Steps Into Home 1   Steps In Home 0   Rail None   Bathroom Set up Walk In Shower   Equipment Owned None   Lives with - Patient's Self Care Capacity Alone and Able to Care For Self   Comments 3 adult children, mom, and boyfriend live nearby   Prior Level of Functional Mobility   Bed Mobility Independent   Transfer Status Independent   Ambulation Independent   Distance Ambulation (Feet)   (community)   Assistive Devices Used None   Wheelchair   (n/a)   Stairs Independent   Comments pt PLOF active and working as a    Vitals   Pulse 80   Patient BP Position Sitting   Blood Pressure (!) 172/105   O2 Delivery Device None - Room Air   Pain 0 - 10 Group   Pain Rating Scale (NPRS) 0   Cognition    Orientation Level Oriented x 4   Level of Consciousness Alert   Ability To Follow Commands 2 Step   Passive ROM Lower Body   Passive ROM Lower Body WDL   Strength Lower Body   Rt Hip Flexion Strength 3- (F-)   Rt Knee Flexion Strength 3 (F)   Rt Knee Extension Strength 3+ (F+)   Rt Ankle Dorsiflexion Strength 3+ (F+)   Comments performed in sitting, L LE 5/5   Sensation Lower Body   Lower Extremity Sensation   X   Rt Lower Extremity Light Touch Absent   Rt Lower Extremity Proprioception Absent   Lower Body Muscle Tone   Comments extensor tone noted in function with gait training   Balance Assessment   Sitting Balance (Static) Good   Sitting Balance (Dynamic) Fair + "   Standing Balance (Static) Poor   Standing Balance (Dynamic) Trace +   Weight Shift Sitting Fair   Weight Shift Standing Poor   Comments standing balance assessed with L UE on railing   Bed Mobility    Supine to Sit Standby Assist   Sit to Supine Standby Assist   Sit to Stand Minimal Assist   Scooting Standby Assist   Rolling Minimum Assist to Lt.   Neurological Concerns   Standing Posture During ADL's Lateral Lean Right   Coordination Lower Body    Other Right Impaired  (impaired in function 2/2 weakness and absent sensation)   Roll Left and Right   Assistance Needed Physical assistance   Physical Assistance Level 25% or less   CARE Score - Roll Left and Right 3   Roll Left and Right Discharge Goal   Discharge Goal 6   Sit to Lying   Assistance Needed Supervision   CARE Score - Sit to Lying 4   Sit to Lying Discharge Goal   Discharge Goal 6   Lying to Sitting on Side of Bed   Assistance Needed Independent   CARE Score - Lying to Sitting on Side of Bed 6   Lying to Sitting on Side of Bed Discharge Goal   Discharge Goal 6   Sit to Stand   Assistance Needed Physical assistance   Physical Assistance Level 25% or less   CARE Score - Sit to Stand 3   Sit to Stand Discharge Goal   Discharge Goal 6   Chair/Bed-to-Chair Transfer   Assistance Needed Physical assistance   Physical Assistance Level 25% or less   CARE Score - Chair/Bed-to-Chair Transfer 3   Chair/Bed-to-Chair Transfer Discharge Goal   Discharge Goal 6   Toilet Transfer   Reason if not Attempted Refused to perform   CARE Score - Toilet Transfer 7   Toilet Transfer Discharge Goal   Discharge Goal 6   Car Transfer   Reason if not Attempted Safety concerns   CARE Score - Car Transfer 88   Car Transfer Discharge Goal   Discharge Goal 3   Walk 10 Feet   Reason if not Attempted Safety concerns   CARE Score - Walk 10 Feet 88   Walk 10 Feet Discharge Goal   Discharge Goal 4   Walk 50 Feet with Two Turns   Reason if not Attempted Safety concerns   CARE Score - Walk 50  Feet with Two Turns 88   Walk 50 Feet with Two Turns Discharge Goal   Discharge Goal 4   Walk 150 Feet   Reason if not Attempted Safety concerns   CARE Score - Walk 150 Feet 88   Walk 150 Feet Discharge Goal   Discharge Goal 4   Walking 10 Feet on Uneven Surfaces   Reason if not Attempted Safety concerns   CARE Score - Walking 10 Feet on Uneven Surfaces 88   Walking 10 Feet on Uneven Surfaces Discharge Goal   Discharge Goal 3   1 Step (Curb)   Reason if not Attempted Safety concerns   CARE Score - 1 Step (Curb) 88   1 Step (Curb) Discharge Goal   Discharge Goal 3   4 Steps   Reason if not Attempted Safety concerns   CARE Score - 4 Steps 88   4 Steps Discharge Goal   Discharge Goal 3   12 Steps   Reason if not Attempted Safety concerns   CARE Score - 12 Steps 88   12 Steps Discharge Goal   Discharge Goal 3   Picking Up Object   Reason if not Attempted Safety concerns   CARE Score - Picking Up Object 88   Picking Up Object Discharge Goal   Discharge Goal 3   Wheel 50 Feet with Two Turns   Assistance Needed Supervision   CARE Score - Wheel 50 Feet with Two Turns 4   Type of Wheelchair/Scooter Manual   Wheel 50 Feet with Two Turns Discharge Goal   Discharge Goal 6   Wheel 150 Feet   Assistance Needed Physical assistance   Physical Assistance Level 26%-50%   CARE Score - Wheel 150 Feet 3   Type of Wheelchair/Scooter Manual   Wheel 150 Feet Discharge Goal   Discharge Goal 6   Gait Functional Level of Assist    Gait Level Of Assist Total Assist X 2  (modA with WC follow)   Assistive Device Parallel Bars  (hallway rail)   Distance (Feet) 20   # of Times Distance was Traveled 1  (as well as 10ftx1)   Deviation Step To;Decreased Base Of Support  (overshift R, R extensor tone, R knee hyperextension, assist for prevention of R LE with swing, facilitation for R hip extension)   Wheelchair Functional Level of Assist   Wheelchair Assist Moderate Assist   Distance Wheelchair (Feet or Distance) 150  (SPV for 75 after demonstration,  physical assist for remaining distance)   Wheelchair Description   (hemitechnique)   Stairs Functional Level of Assist   Level of Assist with Stairs Unable to Participate   Transfer Functional Level of Assist   Bed, Chair, Wheelchair Transfer Minimal Assist   Bed Chair Wheelchair Transfer Description Increased time;Set-up of equipment;Supervision for safety;Verbal cueing;Squat pivot transfer to wheelchair   Problem List    Problems Impaired Bed Mobility;Impaired Transfers;Impaired Ambulation;Functional Strength Deficit;Impaired Balance;Impaired Coordination;Impaired Vision;Motor Planning / Sequencing   Precautions   Precautions Fall Risk   Comments R hemiplegia, absent R UE/LE light touch sensation   Current Discharge Plan   Current Discharge Plan Return to Prior Living Situation   Interdisciplinary Plan of Care Collaboration   IDT Collaboration with  Occupational Therapist;Physician;Nursing   Patient Position at End of Therapy Seated;Chair Alarm On;Self Releasing Lap Belt Applied  (handoff to RN)   Collaboration Comments CLOF/POC, RN notified regarding HTN, MD present to observe fxl mobility during evaluation   Benefit   Therapy Benefit Patient Would Benefit from Inpatient Rehabilitation Physical Therapy to Maximize Functional [place] with ADLs, IADLs and Mobility.   Strengths & Barriers   Strengths Able to follow instructions;Alert and oriented;Good endurance;Independent prior level of function;Making steady progress towards goals;Motivated for self care and independence;Pleasant and cooperative;Supportive family;Willingly participates in therapeutic activities   Barriers Hemiplegia;Impaired balance;Visual impairment;Hypertension     RN notified regarding /105. RN obtained manual BP reading with similar mmHg and notified MD. Mobilization delayed and education provided until pt received hydralazine.     Oriented pt to unit, 3 hr rule, use of call light, and passport. Participated in collaborative goal  setting      Assessment  Patient is 57 y.o. female with a diagnosis of hemorrhagic L thalamic CVA and uncontrolled HTN.  Additional factors influencing patient status / progress (ie: cognitive factors, co-morbidities, social support, etc): impaired R UE/LE sensation, R hemiplegia, independent and active PLOF, lives alone but reports extensive family support, and hx frequent alcohol use.      Plan  Recommend Physical Therapy  minutes per day 5-6 days per week for 3-4 weeks for the following treatments:  PT Gait Training, PT Self Care/Home Eval, PT Therapeutic Exercises, PT Neuro Re-Ed/Balance, PT Aquatic Therapy, PT Therapeutic Activity, and PT Evaluation.    Passport items to be completed:  Get in/out of bed safely, in/out of a vehicle, safely use mobility device, walk or wheel around home/community, navigate up and down stairs, show how to get up/down from the ground, ensure home is accessible, demonstrate HEP, complete caregiver training    Goals:  Long term and short term goals have been discussed with patient and they are in agreement.    Physical Therapy Problems (Active)       Problem: Mobility       Dates: Start: 02/07/23         Goal: STG-Within one week, patient will propel wheelchair 100ft with SPV       Dates: Start: 02/07/23            Goal: STG-Within one week, patient will ambulate 15ft with LRAD with modA and WC follow       Dates: Start: 02/07/23               Problem: Mobility Transfers       Dates: Start: 02/07/23         Goal: STG-Within one week, patient will perform bed mobility independently       Dates: Start: 02/07/23            Goal: STG-Within one week, patient will transfer bed to chair with CGA       Dates: Start: 02/07/23               Problem: PT-Long Term Goals       Dates: Start: 02/07/23         Goal: LTG-By discharge, patient will propel wheelchair 150ft [place]       Dates: Start: 02/07/23            Goal: LTG-By discharge, patient will ambulate 150ft with LRAD and CGA        Dates: Start: 02/07/23            Goal: LTG-By discharge, patient will transfer one surface to another [place]       Dates: Start: 02/07/23            Goal: LTG-By discharge, patient will perform home exercise program independently       Dates: Start: 02/07/23            Goal: LTG-By discharge, patient will ambulate up/down 1 curb step with [place]       Dates: Start: 02/07/23

## 2023-02-08 NOTE — CARE PLAN
Problem: Swallowing STGs  Goal: STG-Within one week, patient will tolerated upgraded 7- Regular Textures  0-Thin liquids  diet with no s/s of aspiration   Outcome: Not Met     Problem: Comprehension STGs  Goal: STG-Within one week, patient will follow 2-step directions with 80% accuracy given min A  Outcome: Not Met     Problem: Expression STGs  Goal: STG-Within one week, patient will complete word-finding tasks (generative naming, categories) with 80% accuracy given min A  Outcome: Not Met     Problem: Problem Solving STGs  Goal: STG-Within one week, patient will demonstrate sustained or alternating attention during structured tasks with 80% accuracy given min A   Outcome: Not Met     Problem: Memory STGs  Goal: STG-Within one week, patient will demonstrate delayed recall of new information with 80% accuracy with min A  Outcome: Not Met

## 2023-02-08 NOTE — CARE PLAN
Problem: Discharge Barriers/Planning  Goal: Patient's continuum of care needs are met  Note: Pt. didn't eat breakfast this am. Pt. states that her daughter will brings organic food only. .      Problem: Hemodynamics  Goal: Patient's hemodynamics, fluid balance and neurologic status will be stable or improve  Note:  BP remains elevated (see flowchart) . Pt. asymptomatic Physiatrist aware.    The patient is Stable - Low risk of patient condition declining or worsening

## 2023-02-08 NOTE — PROGRESS NOTES
"Rehab Progress Note     Date of Service: 2/8/2023  Chief Complaint: follow up stroke    Interval Events (Subjective)    Patient seen and examined today in the therapy gym.   Her daughter Missy is present.  Patient continues to have high blood pressures, medications being adjusted.  Discussed the need to continue salt tabs for now, but will soon titrate.  Patient would like to see the psychologist, though currently is trying to feel hopeful about her potential for recovery.    Weekly conference this afternoon to pick an estimated discharge date.     ROS: No changes to bowel, bladder, pain, mood, or sleep.         Objective:  VITAL SIGNS: BP (!) 148/100   Pulse 74   Temp 36.4 °C (97.6 °F) (Oral)   Resp 18   Ht 1.676 m (5' 6\")   Wt 80.2 kg (176 lb 14.4 oz)   LMP  (LMP Unknown)   SpO2 97%   BMI 28.55 kg/m²   Gen: alert, no apparent distress  CV: Regular rate, regular rhythm  Resp: Clear to auscultation bilaterally  Neuro: right hemiparesis, right hemianesthesia    Recent Results (from the past 72 hour(s))   CBC without Differential    Collection Time: 02/06/23  2:24 AM   Result Value Ref Range    WBC 6.2 4.8 - 10.8 K/uL    RBC 4.18 (L) 4.20 - 5.40 M/uL    Hemoglobin 13.4 12.0 - 16.0 g/dL    Hematocrit 38.6 37.0 - 47.0 %    MCV 92.3 81.4 - 97.8 fL    MCH 32.1 27.0 - 33.0 pg    MCHC 34.7 33.6 - 35.0 g/dL    RDW 41.7 35.9 - 50.0 fL    Platelet Count 374 164 - 446 K/uL    MPV 9.2 9.0 - 12.9 fL   Magnesium    Collection Time: 02/06/23  2:24 AM   Result Value Ref Range    Magnesium 2.0 1.5 - 2.5 mg/dL   Basic Metabolic Panel    Collection Time: 02/06/23  2:24 AM   Result Value Ref Range    Sodium 135 135 - 145 mmol/L    Potassium 3.7 3.6 - 5.5 mmol/L    Chloride 105 96 - 112 mmol/L    Co2 19 (L) 20 - 33 mmol/L    Glucose 94 65 - 99 mg/dL    Bun 14 8 - 22 mg/dL    Creatinine 0.55 0.50 - 1.40 mg/dL    Calcium 9.3 8.5 - 10.5 mg/dL    Anion Gap 11.0 7.0 - 16.0   ESTIMATED GFR    Collection Time: 02/06/23  2:24 AM "   Result Value Ref Range    GFR (CKD-EPI) 107 >60 mL/min/1.73 m 2   COV-2, FLU A/B, AND RSV BY PCR (2-4 HOURS Selleration): Collect NP swab in VTM    Collection Time: 02/06/23  1:40 PM    Specimen: Respirate   Result Value Ref Range    Influenza virus A RNA Negative Negative    Influenza virus B, PCR Negative Negative    RSV, PCR Negative Negative    SARS-CoV-2 by PCR NotDetected     SARS-CoV-2 Source NP Swab    CBC with Differential    Collection Time: 02/07/23  5:41 AM   Result Value Ref Range    WBC 6.1 4.8 - 10.8 K/uL    RBC 4.24 4.20 - 5.40 M/uL    Hemoglobin 13.5 12.0 - 16.0 g/dL    Hematocrit 39.8 37.0 - 47.0 %    MCV 93.9 81.4 - 97.8 fL    MCH 31.8 27.0 - 33.0 pg    MCHC 33.9 33.6 - 35.0 g/dL    RDW 42.3 35.9 - 50.0 fL    Platelet Count 398 164 - 446 K/uL    MPV 9.7 9.0 - 12.9 fL    Neutrophils-Polys 60.00 44.00 - 72.00 %    Lymphocytes 20.30 (L) 22.00 - 41.00 %    Monocytes 11.80 0.00 - 13.40 %    Eosinophils 6.10 0.00 - 6.90 %    Basophils 1.50 0.00 - 1.80 %    Immature Granulocytes 0.30 0.00 - 0.90 %    Nucleated RBC 0.00 /100 WBC    Neutrophils (Absolute) 3.66 2.00 - 7.15 K/uL    Lymphs (Absolute) 1.24 1.00 - 4.80 K/uL    Monos (Absolute) 0.72 0.00 - 0.85 K/uL    Eos (Absolute) 0.37 0.00 - 0.51 K/uL    Baso (Absolute) 0.09 0.00 - 0.12 K/uL    Immature Granulocytes (abs) 0.02 0.00 - 0.11 K/uL    NRBC (Absolute) 0.00 K/uL   Comp Metabolic Panel (CMP)    Collection Time: 02/07/23  5:41 AM   Result Value Ref Range    Sodium 135 135 - 145 mmol/L    Potassium 3.6 3.6 - 5.5 mmol/L    Chloride 103 96 - 112 mmol/L    Co2 21 20 - 33 mmol/L    Anion Gap 11.0 7.0 - 16.0    Glucose 95 65 - 99 mg/dL    Bun 16 8 - 22 mg/dL    Creatinine 0.54 0.50 - 1.40 mg/dL    Calcium 9.2 8.5 - 10.5 mg/dL    AST(SGOT) 21 12 - 45 U/L    ALT(SGPT) 16 2 - 50 U/L    Alkaline Phosphatase 75 30 - 99 U/L    Total Bilirubin 0.4 0.1 - 1.5 mg/dL    Albumin 4.0 3.2 - 4.9 g/dL    Total Protein 6.6 6.0 - 8.2 g/dL    Globulin 2.6 1.9 - 3.5 g/dL    A-G  Ratio 1.5 g/dL   Magnesium    Collection Time: 02/07/23  5:41 AM   Result Value Ref Range    Magnesium 2.1 1.5 - 2.5 mg/dL   proBrain Natriuretic Peptide, NT    Collection Time: 02/07/23  5:41 AM   Result Value Ref Range    NT-proBNP 44 0 - 125 pg/mL   CORRECTED CALCIUM    Collection Time: 02/07/23  5:41 AM   Result Value Ref Range    Correct Calcium 9.2 8.5 - 10.5 mg/dL   ESTIMATED GFR    Collection Time: 02/07/23  5:41 AM   Result Value Ref Range    GFR (CKD-EPI) 108 >60 mL/min/1.73 m 2       Scheduled Medications   Medication Dose Frequency    hydrALAZINE  10 mg Q8HRS    senna-docusate  2 Tablet BID    And    polyethylene glycol/lytes  1 Packet DAILY    [START ON 2/9/2023] losartan  100 mg Q DAY    sodium chloride  2 g TID WITH MEALS    atorvastatin  80 mg Q EVENING    amLODIPine  10 mg Q DAY    [START ON 2/10/2023] cloNIDine  1 Patch Q7 DAYS    enoxaparin (LOVENOX) injection  40 mg DAILY AT 1800       Current Diet Order   Procedures    Diet Order Diet: Regular (Meds one at a time with liquid wash, larger pills in applesauce); Fluid modifications: (optional): 1500 ml Fluid Restriction       Assessment:    This patient is a 57 y.o. female admitted for acute inpatient rehabilitation with Hemorrhagic stroke (HCC).    I led and attended the weekly conference today, and agree with the IDT conference documentation and plan of care as noted below.    Date of conference: 2/8/2023    Goals and barriers: See IDT note.    Biggest barriers: Right hemiparesis, impulsive    CM/social support: Family to provide 24/7    Anticipated DC date: 3/7    Home health: PT/OT/SLP/RN    Equip: To be determined    Follow up: PCP, [facility], Dr. De La Torre      Problem List/Medical Decision Making and Plan:    Left basal hemorrhagic ganglia stroke  From uncontrolled hypertension  Right hemiparesis, continues  Right sarahy-anesthesia, continues  Cognitive impairment  Dysphagia, resolved  Cognitive impairment, poor insight  Impulsive  Mild  expressive aphasia/anomia  Visual impairment  Double vision  Right neglect  Continue full rehab program  PT/OT/SLP, 1 hr each discipline, 5 days per week  [facility] therapy  Recreational therapy    Monitor for developing spasticity and need for baclofen    Outpatient follow up with stroke bridge clinic, Dr. 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  Physical Medicine and Rehabilitation

## 2023-02-08 NOTE — THERAPY
"Occupational Therapy  Daily Treatment     Patient Name: Anali Pan  Age:  57 y.o., Sex:  female  Medical Record #: [id number]  Today's Date: 2/8/2023     Precautions  Precautions: Fall Risk  Comments: R hemiplegia, absent R UE/LE light touch sensation         Subjective    \"I don't eat the food here. My daughter is coming right now with my breakfast.\" Edu on importance of daughter bringing food at meal times to not miss out on therapy.     Objective       02/08/23 0901   OT Charge Group   OT Self Care / ADL (Units) 1   OT Neuromuscular Re-education / Balance (Units) 2   OT Therapy Activity (Units) 1   OT Total Time Spent   OT Individual Total Time Spent (Mins) 60   Vitals   Pulse 75   Patient BP Position Sitting   Blood Pressure (!) 147/120   Vitals Comments after neuro re-ed   ABS (Agitated Behavior Scale)   Short Attention Span, Easy Distractibility, Inability to Concentrate 2   Impulsive, Impatient, Low Tolerance for Pain or Frustration 1   Uncooperative, Resistant to Care, Demanding 1   Violent and/or Threatening Violence Toward People or Property 1   Explosive and/or Unpredictable Anger 1   Rocking, Rubbing, Moaning, Other Self-Stimulating Behavior 2   Pulling at Tubes, Restraints, etc. 1   Wandering from Treatment Area 1   Restlessness, Pacing, Excessive Movement 2   Repetitive Behaviors, Motor and/or Verbal 1   Rapid, Loud or Excessive Talking 1   Sudden Changes of Mood 1   Easily Initiated - Excessive Crying and/or Laughter 1   Self-Abusiveness, Physical and/or Verbal 1   Agitated Behavior Scale Total Score 17   Level of Severity No Agitation   Vision Screen   Vision Tested  (impairments in acuity and double vision. See below.)   Active ROM Upper Body   Comments evaluated hand today during treatment. With significant effort, pt is able to do all 5 hand based tendon glide positions as listed below.   Functional Level of Assist   Eating   (pt is declining food here and daughter is bringing in food. Edu on " "importance of meals coming at meal time so she does not miss therapy. Offered to have pt trial with ADOLFO for therapy purposes, but daughter did not come during this session.)   Grooming Moderate Assist  (Application of contact lenses; SBA only for washing hands)   Toileting Moderate Assist   Toilet Transfers Minimal Assist  (w/c level)   Neuro-Muscular Treatments   Comments see below   Interdisciplinary Plan of Care Collaboration   IDT Collaboration with  Nursing;Physical Therapist   Patient Position at End of Therapy Seated;Self Releasing Lap Belt Applied;Call Light within Reach;Tray Table within Reach;Phone within Reach   Collaboration Comments re: hand off of care       ADOLFO Neuro      ADOLFO Only able to do 1 sequence since it requires significant effort. Completed x2 with CHANDAN too just to confirm she understood sequence. She had difficulty with inhibition and attention to detail to follow sequence correctly, however, was still eventually able to get all required exercises in and would still benefit from completing in her room.      Trialed pt retrieving  1 inch block from a plastic bin. Completed hand over hand and max cues. Unable to grasp block. Switched to koosh ball. Required hand over hand, max cues for each movement and AAROM to get hand up and over basin. Removed 2 koosh balls from basin. Appeared to be sweating so BP taken and was high at 147/120. Paused and informed RN who agreed with pausing therapy at this time.        Vision    Tracking  Pt having difficulty following directions for formal testing. Pt with difficulty with inhibition resulting in moving neck with eyes. Pt making eye contact with OT at times instead of following tracking. Pt anticipating movement and not following actual tracking. Noted more symptoms of fatigue with movements to R side.    Convergence   With both eyes open, pt saw double at about 5 inches from nose.   Attempted convergence exercises \"Eye Push Ups\" in center, 45 degree to R " and 45 degree to L. Pt unable to complete independently. Pt moving finger, but making eye contact with therapist instead of following finger. Trialed with OT holding tracker. Pt still not following exactly, but did still seem to get some exercise from it.    Edu on reason for eye patch. Discussed pros/cons of eye patch. Pt was wearing it on R eye. Edu on use of eye patch on L eye to be able to strengthen R eye. Pt needing reinforcement for follow through. Explained to pt that her and her Primary OT will decide on whether or not the eye patch is the chosen treatment strategy when she returns.    Acuity  Pt w/ c/o impaired acuity. Can read OT 's name badge. No formal test completed today. Applied contact and placed patch on L eye and acuity seemed to improve some. Unclear if pt can differentiate acuity vs. Double vision.    Assessment    Pt's cognition is more impaired than she was aware of. Significant deficits noticed today in attention, inhibition, initiation, termination, safety awareness, sequencing and multi tasking. Pt's hand is capable of moving, but requires significant effort.     Strengths: Able to follow instructions, Alert and oriented, Effective communication skills, Good insight into deficits/needs, Independent prior level of function, Making steady progress towards goals, Manages pain appropriately, Motivated for self care and independence, Pleasant and cooperative, Supportive family, Willingly participates in therapeutic activities  Barriers: Decreased endurance, Impaired activity tolerance, Impaired balance, Limited mobility, Hemiparesis    Plan    RUE Neuro re-ed, Vision (double vision/tracking/acuity), functional cognition, ADLs, activity tolerance. monitor BP for hypertension. Pt is not always symptomatic.    Passport items to be completed:  Perform bathroom transfers, complete dressing, complete feeding, get ready for the day, prepare a simple meal, participate in household tasks, adapt home for  safety needs, demonstrate home exercise program, complete caregiver training     Occupational Therapy Goals (Active)       Problem: Bathing       Dates: Start: 02/07/23         Goal: STG-Within one week, patient will bathe w/ min A.        Dates: Start: 02/07/23               Problem: Dressing       Dates: Start: 02/07/23         Goal: STG-Within one week, patient will dress UB w/ supervision.        Dates: Start: 02/07/23            Goal: STG-Within one week, patient will dress LB w/ min A.        Dates: Start: 02/07/23               Problem: Functional Transfers       Dates: Start: 02/07/23         Goal: STG-Within one week, patient will transfer to toilet w/ CGA.       Dates: Start: 02/07/23            Goal: STG-Within one week, patient will transfer to step in shower w/ CGA.       Dates: Start: 02/07/23               Problem: OT Long Term Goals       Dates: Start: 02/07/23         Goal: LTG-By discharge, patient will complete basic self care tasks w/ mod I - min A.       Dates: Start: 02/07/23            Goal: LTG-By discharge, patient will perform bathroom transfers w/ mod I - min A.       Dates: Start: 02/07/23               Problem: Toileting       Dates: Start: 02/07/23         Goal: STG-Within one week, patient will complete toileting tasks w/ min.       Dates: Start: 02/07/23

## 2023-02-08 NOTE — THERAPY
Speech Language Pathology  Daily Treatment     Patient Name: Anali Pan  Age:  57 y.o., Sex:  female  Medical Record #: [id number]  Today's Date: 2/8/2023     Precautions  Precautions: Fall Risk  Comments: R hemiplegia, absent R UE/LE light touch sensation    Subjective    Patient was willing to participate in ST.      Objective       02/08/23 0802   Treatment Charges   SLP Swallowing Dysfunction Treatment Swallowing Dysfunction Treatment   SLP Cognitive Skill Development First 15 Minutes 1   SLP Cognitive Skill Development Additional 15 Minutes 2   SLP Total Time Spent   SLP Individual Total Time Spent (Mins) 60   Cognition   Complex Information Processing Moderate (3)   Insight into Deficits Moderate (3)   Dysphagia    Diet / Liquid Recommendation Regular (7);Thin (0)       Assessment    Patient was seen in tdine to assess diet tolerance and use of safe swallow strategies. Patient with very limited PO intake (reports daughter to bring in food). Education provided regarding POC related to dysphagia and tdine, however, patient declined further PO and denies difficulties with swallowing.   Completed SCCAN from previous session. Patient with a final score of 74 indicating mild cognitive deficits. Difficulties noted with word finding, memory, attention, and problem solving.   Patient was mildly impulsive during session with decreased insight to deficits. Would benefit from ongoing stroke education.     Strengths: Able to follow instructions, Willingly participates in therapeutic activities, Motivated for self care and independence, Pleasant and cooperative, Independent prior level of function  Barriers: Impaired insight/denial of deficits, Aspiration risk    Plan    Assess diet tolerance at meal, d/c tdine as appropriate, continue to address cognitive deficits.       Speech Therapy Problems (Active)       Problem: Comprehension STGs       Dates: Start: 02/07/23         Goal: STG-Within one week, patient will follow  2-step directions with 80% accuracy given min A       Dates: Start: 02/07/23               Problem: Expression STGs       Dates: Start: 02/07/23         Goal: STG-Within one week, patient will complete word-finding tasks (generative naming, categories) with 80% accuracy given min A       Dates: Start: 02/07/23               Problem: Memory STGs       Dates: Start: 02/07/23         Goal: STG-Within one week, patient will demonstrate delayed recall of new information with 80% accuracy with min A       Dates: Start: 02/07/23               Problem: Problem Solving STGs       Dates: Start: 02/07/23         Goal: STG-Within one week, patient will demonstrate sustained or alternating attention during structured tasks with 80% accuracy given min A        Dates: Start: 02/07/23               Problem: Speech/Swallowing LTGs       Dates: Start: 02/07/23         Goal: LTG-By discharge, patient will tolerate least restrictive diet with no s/s of aspiration        Dates: Start: 02/07/23            Goal: LTG-By discharge, patient will comprehend verbal and written information with 90% accuracy with mod I       Dates: Start: 02/07/23            Goal: LTG-By discharge, patient will express wants, needs, thoughts, and ideas with 90% accuracy with mod I       Dates: Start: 02/07/23            Goal: LTG-By discharge, patient will solve complex problems in structured and functional situations with 90% accuracy with spv        Dates: Start: 02/07/23               Problem: Swallowing STGs       Dates: Start: 02/07/23         Goal: STG-Within one week, patient will tolerated upgraded 7- Regular Textures  0-Thin liquids  diet with no s/s of aspiration        Dates: Start: 02/07/23

## 2023-02-08 NOTE — CARE PLAN
Problem: Mobility  Goal: STG-Within one week, patient will propel wheelchair 100ft with SPV  Outcome: Not Met  Goal: STG-Within one week, patient will ambulate 15ft with LRAD with modA and WC follow  Outcome: Not Met     Problem: Mobility Transfers  Goal: STG-Within one week, patient will perform bed mobility independently  Outcome: Not Met  Goal: STG-Within one week, patient will transfer bed to chair with CGA  Outcome: Not Met     Goals were established on initial evaluation yesterday and are still appropriate for pt

## 2023-02-08 NOTE — CARE PLAN
"Safety measures observed ,call light within easy reach, needs anticipated and attended.  Problem: Hemodynamics  Goal: Patient's hemodynamics, fluid balance and neurologic status will be stable or improve  Outcome: Progressing  Note: BP at the start of the shift - 170/77, hydralazine given by am thi Oswald, Bp rechecked at midnight - 144/91, hydralazine 10 mg p.o given . Cont monitored.     Problem: Risk for Aspiration  Goal: Patient's risk for aspiration will be absent or decrease  Outcome: Progressing  Note: Aspiration precautions observed ,meds tolerated.   The patient is Watcher - Medium risk of patient condition declining or worsening  Problem: Fall Risk - Rehab  Goal: Patient will remain free from falls  Note: Schaffer Sher Fall risk Assessment Score: 12    Moderate fall risk Interventions  - Bed and strip alarm   - Yellow sign by the door   - Yellow wrist band \"Fall risk\"  - Room near to the nurse station  - Do not leave patient unattended in the bathroom  - Fall risk education provided       Progress made toward(s) clinical / shift goals:  Pt free from fall and injury.  "

## 2023-02-08 NOTE — THERAPY
Physical Therapy   Daily Treatment     Patient Name: Anali Pan  Age:  57 y.o., Sex:  female  Medical Record #: [id number]  Today's Date: 2/8/2023     Precautions  Precautions: Fall Risk  Comments: R hemiplegia, absent R UE/LE light touch sensation    Subjective    Pt reporting that she has been practicing moving her R UE between therapy sessions. Also reporting minimal improvement in sensation with R UE/LE feeling heavy     Objective       02/08/23 1030   PT Charge Group   PT Gait Training (Units) 1   PT Neuromuscular Re-Education / Balance (Units) 1   PT Therapeutic Activities (Units) 2   PT Total Time Spent   PT Individual Total Time Spent (Mins) 60   Vitals   Pulse 92   Patient BP Position Sitting   Blood Pressure (!) 156/113   Gait Functional Level of Assist    Gait Level Of Assist Total Assist X 2  (modA with WC follow)   Assistive Device Front Wheel Walker   Distance (Feet) 15   # of Times Distance was Traveled 1   Deviation Step To;Decreased Base Of Support  (overshift R, R knee hyperextension, facilitation for R hip extension, mirror feedback)   Wheelchair Functional Level of Assist   Wheelchair Assist Minimal Assist   Distance Wheelchair (Feet or Distance) 100   Wheelchair Description Extra time;Impaired coordination;Supervision for safety;Assistance with steering  (hemipropulsion)   Transfer Functional Level of Assist   Bed, Chair, Wheelchair Transfer Minimal Assist   Bed Chair Wheelchair Transfer Description Squat pivot transfer to wheelchair;Initial preparation for task;Set-up of equipment   Bed Mobility    Supine to Sit Supervised   Sit to Supine Supervised   Sit to Stand Minimal Assist   Scooting Supervised   Neuro-Muscular Treatments   Comments PNF UE ext/add L>R, R LE flex/add, R LE ext/abd, [id number] with R LE stabilization, static standing balance with B UE support on therapist's shoulders   Interdisciplinary Plan of Care Collaboration   IDT Collaboration with  Physician;Family / Caregiver    Patient Position at End of Therapy Seated;Chair Alarm On;Self Releasing Lap Belt Applied  (handoff to t-dine)   Collaboration Comments MD present to observe fxl mobility, daughter present for session     RN notified regarding HTN, pt received hydralazing 40 min prior to tx    Educated pt regarding common CVA impairments, expectation of recovery, and recommended DC date for optimal improvement in fxl mobility.    Assessment    Pt progressed to gait training with FWW, WC follow, and mirror feedback. She requires assistance to maintain R UE grasp, as well as facilitation for R hip extension in stance and for weightshifting. Pt continues to be limited by R hemiplegia, impaired R sided sensation, and R inattention in function.  Strengths: Able to follow instructions, Alert and oriented, Good endurance, Independent prior level of function, Making steady progress towards goals, Motivated for self care and independence, Pleasant and cooperative, Supportive family, Willingly participates in therapeutic activities  Barriers: Hemiplegia, Impaired balance, Visual impairment, Hypertension    Plan    Pregait at rail, gait FWW with adaptive , squat pivot transfer training for increased independence    Passport items to be completed:  Get in/out of bed safely, in/out of a vehicle, safely use mobility device, walk or wheel around home/community, navigate up and down stairs, show how to get up/down from the ground, ensure home is accessible, demonstrate HEP, complete caregiver training    Physical Therapy Problems (Active)       Problem: Mobility       Dates: Start: 02/07/23         Goal: STG-Within one week, patient will propel wheelchair 100ft with SPV       Dates: Start: 02/07/23            Goal: STG-Within one week, patient will ambulate 15ft with LRAD with modA and WC follow       Dates: Start: 02/07/23               Problem: Mobility Transfers       Dates: Start: 02/07/23         Goal: STG-Within one week, patient will  perform bed mobility independently       Dates: Start: 02/07/23            Goal: STG-Within one week, patient will transfer bed to chair with CGA       Dates: Start: 02/07/23               Problem: PT-Long Term Goals       Dates: Start: 02/07/23         Goal: LTG-By discharge, patient will propel wheelchair 150ft [place]       Dates: Start: 02/07/23            Goal: LTG-By discharge, patient will ambulate 150ft with LRAD and CGA       Dates: Start: 02/07/23            Goal: LTG-By discharge, patient will transfer one surface to another [place]       Dates: Start: 02/07/23            Goal: LTG-By discharge, patient will perform home exercise program independently       Dates: Start: 02/07/23            Goal: LTG-By discharge, patient will ambulate up/down 1 curb step with [place]       Dates: Start: 02/07/23

## 2023-02-08 NOTE — CARE PLAN
Problem: Bathing  Goal: STG-Within one week, patient will bathe w/ min A.   Outcome: Not Met     Problem: Dressing  Goal: STG-Within one week, patient will dress UB w/ supervision.   Outcome: Not Met  Goal: STG-Within one week, patient will dress LB w/ min A.   Outcome: Not Met     Problem: Toileting  Goal: STG-Within one week, patient will complete toileting tasks w/ min.  Outcome: Not Met     Problem: Functional Transfers  Goal: STG-Within one week, patient will transfer to toilet w/ CGA.  Outcome: Not Met  Goal: STG-Within one week, patient will transfer to step in shower w/ CGA.  Outcome: Not Met

## 2023-02-09 LAB — SODIUM SERPL-SCNC: 135 MMOL/L (ref 135–145)

## 2023-02-09 PROCEDURE — 97116 GAIT TRAINING THERAPY: CPT

## 2023-02-09 PROCEDURE — 92507 TX SP LANG VOICE COMM INDIV: CPT

## 2023-02-09 PROCEDURE — 700111 HCHG RX REV CODE 636 W/ 250 OVERRIDE (IP): Performed by: PHYSICAL MEDICINE & REHABILITATION

## 2023-02-09 PROCEDURE — 99232 SBSQ HOSP IP/OBS MODERATE 35: CPT | Performed by: PHYSICAL MEDICINE & REHABILITATION

## 2023-02-09 PROCEDURE — 97530 THERAPEUTIC ACTIVITIES: CPT

## 2023-02-09 PROCEDURE — 92526 ORAL FUNCTION THERAPY: CPT

## 2023-02-09 PROCEDURE — 97150 GROUP THERAPEUTIC PROCEDURES: CPT

## 2023-02-09 PROCEDURE — 84295 ASSAY OF SERUM SODIUM: CPT

## 2023-02-09 PROCEDURE — 770010 HCHG ROOM/CARE - REHAB SEMI PRIVAT*

## 2023-02-09 PROCEDURE — 700102 HCHG RX REV CODE 250 W/ 637 OVERRIDE(OP): Performed by: PHYSICAL MEDICINE & REHABILITATION

## 2023-02-09 PROCEDURE — 36415 COLL VENOUS BLD VENIPUNCTURE: CPT

## 2023-02-09 PROCEDURE — 97112 NEUROMUSCULAR REEDUCATION: CPT

## 2023-02-09 PROCEDURE — 97129 THER IVNTJ 1ST 15 MIN: CPT

## 2023-02-09 PROCEDURE — A9270 NON-COVERED ITEM OR SERVICE: HCPCS | Performed by: PHYSICAL MEDICINE & REHABILITATION

## 2023-02-09 PROCEDURE — 97130 THER IVNTJ EA ADDL 15 MIN: CPT

## 2023-02-09 RX ORDER — GABAPENTIN 100 MG/1
100 CAPSULE ORAL 3 TIMES DAILY
Status: DISCONTINUED | OUTPATIENT
Start: 2023-02-09 | End: 2023-02-14

## 2023-02-09 RX ADMIN — GABAPENTIN 100 MG: 100 CAPSULE ORAL at 14:56

## 2023-02-09 RX ADMIN — SENNOSIDES AND DOCUSATE SODIUM 2 TABLET: 50; 8.6 TABLET ORAL at 08:55

## 2023-02-09 RX ADMIN — POLYETHYLENE GLYCOL 3350 1 PACKET: 17 POWDER, FOR SOLUTION ORAL at 08:56

## 2023-02-09 RX ADMIN — SODIUM CHLORIDE 2 G: 1 TABLET ORAL at 17:38

## 2023-02-09 RX ADMIN — AMLODIPINE BESYLATE 10 MG: 5 TABLET ORAL at 05:40

## 2023-02-09 RX ADMIN — HYDRALAZINE HYDROCHLORIDE 25 MG: 25 TABLET, FILM COATED ORAL at 14:56

## 2023-02-09 RX ADMIN — SENNOSIDES AND DOCUSATE SODIUM 2 TABLET: 50; 8.6 TABLET ORAL at 20:43

## 2023-02-09 RX ADMIN — HYDRALAZINE HYDROCHLORIDE 25 MG: 25 TABLET, FILM COATED ORAL at 05:41

## 2023-02-09 RX ADMIN — ENOXAPARIN SODIUM 40 MG: 40 INJECTION SUBCUTANEOUS at 17:38

## 2023-02-09 RX ADMIN — ATORVASTATIN CALCIUM 80 MG: 40 TABLET, FILM COATED ORAL at 20:43

## 2023-02-09 RX ADMIN — LOSARTAN POTASSIUM 100 MG: 25 TABLET, FILM COATED ORAL at 05:44

## 2023-02-09 RX ADMIN — GABAPENTIN 100 MG: 100 CAPSULE ORAL at 20:43

## 2023-02-09 RX ADMIN — SODIUM CHLORIDE 2 G: 1 TABLET ORAL at 11:19

## 2023-02-09 RX ADMIN — HYDRALAZINE HYDROCHLORIDE 25 MG: 25 TABLET, FILM COATED ORAL at 22:06

## 2023-02-09 RX ADMIN — SODIUM CHLORIDE 2 G: 1 TABLET ORAL at 08:55

## 2023-02-09 ASSESSMENT — GAIT ASSESSMENTS
DISTANCE (FEET): 15
ASSISTIVE DEVICE: FRONT WHEEL WALKER
GAIT LEVEL OF ASSIST: TOTAL ASSIST X 2
DEVIATION: STEP TO;DECREASED BASE OF SUPPORT;BRADYKINETIC

## 2023-02-09 NOTE — THERAPY
Recreational Therapy   Initial Evaluation     Patient Name: Anali Pan  Age:  57 y.o., Sex:  female  Medical Record #: [id number]  Today's Date: 2/9/2023     Subjective    Pt was receptive and willing to participate in recreation therapy evaluation.     Objective       02/09/23 1501   Procedural Tracking   Procedural Tracking Community Skills Development;Leisure Skills Awareness;Leisure Skills Development;Social Skills Training;Cognitive Skills Training   Treatment Time   Total Time Spent (mins) 30   Total Time Missed 0   Leisure History   Leisure Interests Card;Family;Gardening;Pets;Reading;Television;Travel;Hobbies  (Camping, skiing, being outside)   Pre-Morbid Leisure Lifestyle Individual;Group;Active   Prior Living Arrangements   Lives with - Patient's Self Care Capacity Alone and Able to Care For Self   Steps Into Home 1   Steps In Home 0   Ambulation Independent   Assistive Devices Used None   Driving / Transportation Driving Independent   Functional Ability Status - Physical   Right  Weak   Right Arm Weak   Right Leg Weak   Upper Extremity Gross Motor Uses Left Arm / Hand   Lower Extremity Gross Motor Uses Left Leg;Uses Right Leg   Fine Motor Comments RUE and RLE diminished   Functional Ability Status - Cognitive   Attention Span Remains on Task   Comprehension Follows Two Step Commands   Judgment Able to Make Independent Decisions   Functional Ability Status - Emotional    Affect Appropriate;Bright   Mood Appropriate   Behavior Appropriate;Cooperative   Leisure Competence Measure   Leisure Awareness Independent   Leisure Attitude Independent   Leisure Skills Independent   Cultural / Social Behaviors Independent   Interpersonal Skills Independent   Community Integration Skills Minimal Assist   Social Contact Independent   Community Participation Minimal Assist   Clinical Impression   Clinical Impression Impaired Endurance;Impaired Leisure Skills;Impaired Fine Motor Leisure Functioning   Current  Discharge Plan   Current Discharge Plan Return to Prior Living Situation   Benefit    Benefit Patient would Benefit from Inpatient Recreational Therapy to Maximize Independent Leisure Functioning    Strengths & Barriers   Strengths Able to follow instructions;Alert and oriented;Effective communication skills;Independent prior level of function;Motivated for self care and independence   Barriers Decreased endurance;Generalized weakness;Impaired balance         Assessment  Patient is 57 y.o. female with a past medical history of hypertension noncompliant on antihypertensive medications and alcohol use of approximately 5-6 drinks per day;  who presented on 1/31/2023 11:17 PM as a transfer from [facility] with right-sided weakness.  Per documentation, patient had acute onset right-sided weakness and difficulty speaking, she arrived at the outside hospital with SBP up to 200 and a diagnosis of Stroke: Right Body Involvement (Left Brain).      Plan  Recommend Recreational Therapy 30-60 minutes per day 3-4 days per week for 4 weeks for the following treatments:  Community Re-Integration, Community Skills Development, Leisure Skills Awareness, Leisure Skills Development, Cognitive Skills Training, Gross Motor Functional Leisure Skills, and Fine Motor Functional Leisure Skills    Passport items to be completed:  Verbalize two positive leisure activities, discuss returning to work, hobbies, community groups or volunteer activities, explore community resources     Goals:  Long term and short term goals have been discussed with patient and patient's daughter and they are in agreement.

## 2023-02-09 NOTE — THERAPY
Speech Language Pathology  Daily Treatment     Patient Name: Anali Pan  Age:  57 y.o., Sex:  female  Medical Record #: [id number]  Today's Date: 2/9/2023     Precautions  Precautions: Fall Risk  Comments: R hemiplegia, absent R UE/LE light touch sensation    Subjective    Patient was willing to participate in ST, but requested to stay in room for meal.      Objective       02/09/23 1132   Treatment Charges   SLP Swallowing Dysfunction Treatment Swallowing Dysfunction Treatment   SLP Total Time Spent   SLP Individual Total Time Spent (Mins) 30   Dysphagia    Diet / Liquid Recommendation Regular (7);Thin (0)         Assessment    Patient was assessed with current diet textures. One cough noted on regular textures when patient was talking during PO, otherwise no overt s/sx of aspiration were noted.     Strengths: Able to follow instructions, Willingly participates in therapeutic activities, Motivated for self care and independence, Pleasant and cooperative, Independent prior level of function  Barriers: Impaired insight/denial of deficits, Aspiration risk    Plan    D/c tdine, continue to address cognitive linguistic deficits.         Speech Therapy Problems (Active)       Problem: Comprehension STGs       Dates: Start: 02/07/23         Goal: STG-Within one week, patient will follow 2-step directions with 80% accuracy given min A       Dates: Start: 02/07/23               Problem: Expression STGs       Dates: Start: 02/07/23         Goal: STG-Within one week, patient will complete word-finding tasks (generative naming, categories) with 80% accuracy given min A       Dates: Start: 02/07/23               Problem: Memory STGs       Dates: Start: 02/07/23         Goal: STG-Within one week, patient will demonstrate delayed recall of new information with 80% accuracy with min A       Dates: Start: 02/07/23               Problem: Problem Solving STGs       Dates: Start: 02/07/23         Goal: STG-Within one week, patient  will demonstrate sustained or alternating attention during structured tasks with 80% accuracy given min A        Dates: Start: 02/07/23               Problem: Speech/Swallowing LTGs       Dates: Start: 02/07/23         Goal: LTG-By discharge, patient will tolerate least restrictive diet with no s/s of aspiration        Dates: Start: 02/07/23            Goal: LTG-By discharge, patient will comprehend verbal and written information with 90% accuracy with mod I       Dates: Start: 02/07/23            Goal: LTG-By discharge, patient will express wants, needs, thoughts, and ideas with 90% accuracy with mod I       Dates: Start: 02/07/23            Goal: LTG-By discharge, patient will solve complex problems in structured and functional situations with 90% accuracy with spv        Dates: Start: 02/07/23               Problem: Swallowing STGs       Dates: Start: 02/07/23         Goal: STG-Within one week, patient will tolerated upgraded 7- Regular Textures  0-Thin liquids  diet with no s/s of aspiration        Dates: Start: 02/07/23

## 2023-02-09 NOTE — CARE PLAN
"Karime Olivarez Fall risk Assessment Score: 12    Moderate fall risk Interventions  - Bed and strip alarm   - Yellow sign by the door   - Yellow wrist band \"Fall risk\"  - Room near to the nurse station  - Do not leave patient unattended in the bathroom  - Fall risk education provided     "

## 2023-02-09 NOTE — THERAPY
Occupational Therapy  Daily Treatment     Patient Name: Anali Pan  Age:  57 y.o., Sex:  female  Medical Record #: [id number]  Today's Date: 2/9/2023     Precautions  Precautions: (P) Fall Risk  Comments: (P) R hemiplegia, absent R UE/LE light touch sensation         Subjective    Pt attended stroke education class.      Objective       02/09/23 1031   OT Charge Group   OT Group Therapy Group Activities   OT Total Time Spent   OT Group Total Time Spent (Mins) 30   Precautions   Precautions Fall Risk   Comments R hemiplegia, absent R UE/LE light touch sensation   Cognition    Level of Consciousness Alert   Reason for Group Therapy   Reason for Group Therapy To Enhance Motivation;To Facilitate Goal Discussion    Interdisciplinary Plan of Care Collaboration   Patient Position at End of Therapy Seated;Chair Alarm On;Self Releasing Lap Belt Applied;Call Light within Reach;Tray Table within Reach;Phone within Reach       Pt was educated on stroke risk factors via auditory and visual modalities (printed handout reviewed and provided). Topics reviewed included stroke types, controllable stroke risk factors including HTN and cholesterol, weight management, diet, stress management, daily BP monitoring, smoking cessation, and moderating alcohol intake. Pt was also educated on relationship between stroke and a-fib, sleep apnea, and diabetes. Patient was counseled on the importance of active communication with MD regarding medications, BP tracking and trends to ensure safe BP management. Discussed B.E.F.A.S.T. acronym to recognize signs of stroke. Provided pt with information for joining [facility] stroke support group after d/c if interested.      Assessment    Pt actively participated in stroke education class. She was encouraged to follow up with physician and/or pharmacist with any specific medical/medication-related questions.   Strengths: Able to follow instructions, Alert and oriented, Effective communication skills, Good  insight into deficits/needs, Independent prior level of function, Making steady progress towards goals, Manages pain appropriately, Motivated for self care and independence, Pleasant and cooperative, Supportive family, Willingly participates in therapeutic activities  Barriers: Decreased endurance, Impaired activity tolerance, Impaired balance, Limited mobility, Hemiparesis    Plan    RUE Neuro re-ed, Vision (double vision/tracking/acuity), functional cognition, ADLs, activity tolerance. monitor BP for hypertension. Pt is not always symptomatic.     Passport items to be completed:  Perform bathroom transfers, complete dressing, complete feeding, get ready for the day, prepare a simple meal, participate in household tasks, adapt home for safety needs, demonstrate home exercise program, complete caregiver training     Occupational Therapy Goals (Active)       Problem: Bathing       Dates: Start: 02/07/23         Goal: STG-Within one week, patient will bathe w/ min A.        Dates: Start: 02/07/23               Problem: Dressing       Dates: Start: 02/07/23         Goal: STG-Within one week, patient will dress UB w/ supervision.        Dates: Start: 02/07/23            Goal: STG-Within one week, patient will dress LB w/ min A.        Dates: Start: 02/07/23               Problem: Functional Transfers       Dates: Start: 02/07/23         Goal: STG-Within one week, patient will transfer to toilet w/ CGA.       Dates: Start: 02/07/23            Goal: STG-Within one week, patient will transfer to step in shower w/ CGA.       Dates: Start: 02/07/23               Problem: OT Long Term Goals       Dates: Start: 02/07/23         Goal: LTG-By discharge, patient will complete basic self care tasks w/ mod I - min A.       Dates: Start: 02/07/23            Goal: LTG-By discharge, patient will perform bathroom transfers w/ mod I - min A.       Dates: Start: 02/07/23               Problem: Toileting       Dates: Start: 02/07/23          Goal: STG-Within one week, patient will complete toileting tasks w/ min.       Dates: Start: 02/07/23

## 2023-02-09 NOTE — DISCHARGE PLANNING
CM met with patient to update on IDT and DC date of 3/7/23.  Answered questions and will continue to monitor for DC needs.

## 2023-02-09 NOTE — THERAPY
"Speech Language Pathology  Daily Treatment     Patient Name: Anali Pan  Age:  57 y.o., Sex:  female  Medical Record #: [id number]  Today's Date: 2023     Precautions  Precautions: Fall Risk  Comments: R hemiplegia, absent R UE/LE light touch sensation    Subjective    Pt pleasant and cooperative during treatment.     Objective       23 0931   Treatment Charges   SLP Treatment - Individual Speech Language Treatment - Individual   SLP Cognitive Skill Development First 15 Minutes 1   SLP Cognitive Skill Development Additional 15 Minutes 1   SLP Total Time Spent   SLP Individual Total Time Spent (Mins) 60   Receptive Language / Auditory Comprehension   Follows Two Unit Commands Minimal (4)   Expressive Language   Word Finding Deficits Minimal (4)   Cognition   Moderate Attention Moderate (3)         Assessment    2 step directives: 90% independent.  Confrontation namin% independent.  [place] category namin words per minute.  Pt required min-mod verbal cues to maintain category.  Example: Pt was required to name animals.  Pt started by naming \"[place] Escobar\" then stated dog, dog food, dog bowl.  Pt required less cues as this task progressed.  Find the THEs: 12/15 independent; 14/15 [place], 100% mod verbal and written cues.  Memory book introduced this day.  Pt required min-mod verbal cues to recall daily events and previous therapies.     Strengths: Able to follow instructions, Willingly participates in therapeutic activities, Motivated for self care and independence, Pleasant and cooperative, Independent prior level of function  Barriers: Impaired insight/denial of deficits, Aspiration risk    Plan    Generative naming, 2-3 step directives; delayed recall, sustained/alternating attn        Speech Therapy Problems (Active)       Problem: Comprehension STGs       Dates: Start: 23         Goal: STG-Within one week, patient will follow 2-step directions with 80% accuracy given min A       " Dates: Start: 02/07/23               Problem: Expression STGs       Dates: Start: 02/07/23         Goal: STG-Within one week, patient will complete word-finding tasks (generative naming, categories) with 80% accuracy given min A       Dates: Start: 02/07/23               Problem: Memory STGs       Dates: Start: 02/07/23         Goal: STG-Within one week, patient will demonstrate delayed recall of new information with 80% accuracy with min A       Dates: Start: 02/07/23               Problem: Problem Solving STGs       Dates: Start: 02/07/23         Goal: STG-Within one week, patient will demonstrate sustained or alternating attention during structured tasks with 80% accuracy given min A        Dates: Start: 02/07/23               Problem: Speech/Swallowing LTGs       Dates: Start: 02/07/23         Goal: LTG-By discharge, patient will tolerate least restrictive diet with no s/s of aspiration        Dates: Start: 02/07/23            Goal: LTG-By discharge, patient will comprehend verbal and written information with 90% accuracy with mod I       Dates: Start: 02/07/23            Goal: LTG-By discharge, patient will express wants, needs, thoughts, and ideas with 90% accuracy with mod I       Dates: Start: 02/07/23            Goal: LTG-By discharge, patient will solve complex problems in structured and functional situations with 90% accuracy with spv        Dates: Start: 02/07/23               Problem: Swallowing STGs       Dates: Start: 02/07/23         Goal: STG-Within one week, patient will tolerated upgraded 7- Regular Textures  0-Thin liquids  diet with no s/s of aspiration        Dates: Start: 02/07/23

## 2023-02-09 NOTE — PROGRESS NOTES
"Rehab Progress Note     Date of Service: 2/9/2023  Chief Complaint: follow up stroke    Interval Events (Subjective)    Patient seen and examined today in her room.  Her other daughter is present.  Patient's blood pressure was improved this morning.  She reports some pain sensation in her right arm which is new since this admission.  She denies any spasms.  Her sodium remains at 135 today.  She went to the stroke education class today.    Patient has no other new questions, concerns, or complaints today.       ROS: No changes to bowel, bladder, mood, or sleep.           Objective:  VITAL SIGNS: /76   Pulse 63   Temp 36.8 °C (98.3 °F) (Oral)   Resp 18   Ht 1.676 m (5' 6\")   Wt 80.2 kg (176 lb 14.4 oz)   LMP  (LMP Unknown)   SpO2 96%   BMI 28.55 kg/m²   Gen: alert, no apparent distress  CV: Regular rate, regular rhythm  Resp: Clear to auscultation bilaterally  Neuro: Right hemiparesis, right hemianesthesia, no increased tone on the right arm including the elbow or wrist, negative Jori's    Recent Results (from the past 72 hour(s))   CBC with Differential    Collection Time: 02/07/23  5:41 AM   Result Value Ref Range    WBC 6.1 4.8 - 10.8 K/uL    RBC 4.24 4.20 - 5.40 M/uL    Hemoglobin 13.5 12.0 - 16.0 g/dL    Hematocrit 39.8 37.0 - 47.0 %    MCV 93.9 81.4 - 97.8 fL    MCH 31.8 27.0 - 33.0 pg    MCHC 33.9 33.6 - 35.0 g/dL    RDW 42.3 35.9 - 50.0 fL    Platelet Count 398 164 - 446 K/uL    MPV 9.7 9.0 - 12.9 fL    Neutrophils-Polys 60.00 44.00 - 72.00 %    Lymphocytes 20.30 (L) 22.00 - 41.00 %    Monocytes 11.80 0.00 - 13.40 %    Eosinophils 6.10 0.00 - 6.90 %    Basophils 1.50 0.00 - 1.80 %    Immature Granulocytes 0.30 0.00 - 0.90 %    Nucleated RBC 0.00 /100 WBC    Neutrophils (Absolute) 3.66 2.00 - 7.15 K/uL    Lymphs (Absolute) 1.24 1.00 - 4.80 K/uL    Monos (Absolute) 0.72 0.00 - 0.85 K/uL    Eos (Absolute) 0.37 0.00 - 0.51 K/uL    Baso (Absolute) 0.09 0.00 - 0.12 K/uL    Immature Granulocytes " (abs) 0.02 0.00 - 0.11 K/uL    NRBC (Absolute) 0.00 K/uL   Comp Metabolic Panel (CMP)    Collection Time: 02/07/23  5:41 AM   Result Value Ref Range    Sodium 135 135 - 145 mmol/L    Potassium 3.6 3.6 - 5.5 mmol/L    Chloride 103 96 - 112 mmol/L    Co2 21 20 - 33 mmol/L    Anion Gap 11.0 7.0 - 16.0    Glucose 95 65 - 99 mg/dL    Bun 16 8 - 22 mg/dL    Creatinine 0.54 0.50 - 1.40 mg/dL    Calcium 9.2 8.5 - 10.5 mg/dL    AST(SGOT) 21 12 - 45 U/L    ALT(SGPT) 16 2 - 50 U/L    Alkaline Phosphatase 75 30 - 99 U/L    Total Bilirubin 0.4 0.1 - 1.5 mg/dL    Albumin 4.0 3.2 - 4.9 g/dL    Total Protein 6.6 6.0 - 8.2 g/dL    Globulin 2.6 1.9 - 3.5 g/dL    A-G Ratio 1.5 g/dL   Magnesium    Collection Time: 02/07/23  5:41 AM   Result Value Ref Range    Magnesium 2.1 1.5 - 2.5 mg/dL   proBrain Natriuretic Peptide, NT    Collection Time: 02/07/23  5:41 AM   Result Value Ref Range    NT-proBNP 44 0 - 125 pg/mL   CORRECTED CALCIUM    Collection Time: 02/07/23  5:41 AM   Result Value Ref Range    Correct Calcium 9.2 8.5 - 10.5 mg/dL   ESTIMATED GFR    Collection Time: 02/07/23  5:41 AM   Result Value Ref Range    GFR (CKD-EPI) 108 >60 mL/min/1.73 m 2   SODIUM SERUM (NA)    Collection Time: 02/09/23  5:22 AM   Result Value Ref Range    Sodium 135 135 - 145 mmol/L       Scheduled Medications   Medication Dose Frequency    gabapentin  100 mg TID    senna-docusate  2 Tablet BID    And    polyethylene glycol/lytes  1 Packet DAILY    losartan  100 mg Q DAY    hydrALAZINE  25 mg Q8HRS    sodium chloride  2 g TID WITH MEALS    atorvastatin  80 mg Q EVENING    amLODIPine  10 mg Q DAY    [START ON 2/10/2023] cloNIDine  1 Patch Q7 DAYS    enoxaparin (LOVENOX) injection  40 mg DAILY AT 1800       Current Diet Order   Procedures    Diet Order Diet: Regular (Meds one at a time with liquid wash, larger pills in applesauce); Fluid modifications: (optional): 1500 ml Fluid Restriction       Assessment:    This patient is a 57 y.o. female admitted for  acute inpatient rehabilitation with Hemorrhagic stroke (HCC).    I led and attended the weekly conference, and agree with the IDT conference documentation and plan of care as noted below.    Date of conference: 2/8/2023    Goals and barriers: See IDT note.    Biggest barriers: Right hemiparesis, impulsive    CM/social support: Family to provide 24/7    Anticipated DC date: 3/7    Home health: PT/OT/SLP/RN    Equip: To be determined    Follow up: PCP, [facility], Dr. De La Torre      Problem List/Medical Decision Making and Plan:    Left basal hemorrhagic ganglia stroke  From uncontrolled hypertension  Right hemiparesis, continues  Right sarahy-anesthesia, continues  Cognitive impairment  Dysphagia, resolved  Cognitive impairment, poor insight  Impulsive  Mild expressive aphasia/anomia  Visual impairment  Double vision  Right neglect  Continue full rehab program  PT/OT/SLP, 1 hr each discipline, 5 days per week  [facility] therapy  Recreational therapy    Monitor for developing spasticity and need for baclofen    Outpatient follow up with stroke bridge clinic, Dr. De La Torre/PMR, referrals made    Neuropathic pain  In the right arm  Start low-dose gabapentin  Monitor need to increase dosing    Hyperlipidemia    Statin, increase dose to 80 g  Goal <70    Hypertension, continues  Clonidine, will attempt to titrate off, not good for stroke recovery  Amlodipine 10 mg, max dose  Losartan, dose increased 50 --> 75 mg --> 100 mg  Added scheduled hydralazine 10 mg TID --> 25 mg TID  Titrate off salt tabs soon as possible  Finally improved today     Alcohol use  Will need counseling  Neuro-psychologist consulted     PFO  Not cause of stroke  Outpatient follow up     Hyponatremia, improved  Likely SIADH from hemorrhage  Titrate off salt tabs  Current Na 135, continue current dosing   Recheck sodium on Monday    Constipation, resolved  Increased bowel medications  Last BM 2/8    DVT prophylaxis  Lovenox    Vanessa Huizar,  M.D.  Physical Medicine and Rehabilitation

## 2023-02-09 NOTE — THERAPY
"Occupational Therapy  Daily Treatment     Patient Name: Anali Pan  Age:  57 y.o., Sex:  female  Medical Record #: [id number]  Today's Date: 2/9/2023     Precautions  Precautions: Fall Risk  Comments: R hemiplegia, absent R UE/LE light touch sensation         Subjective    \"This should not be this hard!\"  Pt also reported pain in upper arm throughout session.     Objective       02/09/23 0831   OT Charge Group   OT Therapy Activity (Units) 4   OT Total Time Spent   OT Individual Total Time Spent (Mins) 60   Precautions   Precautions Fall Risk   Comments R hemiplegia, absent R UE/LE light touch sensation   Vitals   Blood Pressure 125/76   Strength Upper Body   Upper Body Strength  X   Shoulder Elevation Symmetric   Shoulder Retraction Symmetric  (slightly weaker on R)   Shoulder Protraction Symmetric  (slightly weaker on R)   Rt Shoulder Flexion Strength 3- (F-)   Rt Shoulder Extension Strength 3- (F-)   Rt Shoulder Abduction Strength 2 (P)   Rt Shoulder Ext Rotation Strength 3 (F)   Rt Shoulder Int Rotation Strength 3 (F)   Rt Elbow Flexion Strength 3- (F-)   Rt Elbow Extension Strength 3- (F-)   Rt Forearm Pronation Strength 3+ (F+)   Rt Wrist Flexion Strength 3- (F-)   Rt Wrist Extension Strength 3- (F-)   Right  Impaired  (2#, 3#, 5#)   Right Lateral Pinch Impaired  (>1#)   Functional Level of Assist   Grooming Moderate Assist  (assist to place contact lenses, supervision for hand washing)   Grooming Description Seated in wheelchair at sink   Fine Motor / Dexterity    Fine Motor / Dexterity Yes   Fine Motor / Dexterity Interventions Pt completed activities with blocks, cones, and rings to address RUE function.   Comments  Cones- Pt used RUE to reach for, grasp, and stack cones. Pt required assistance to hold cone still while pt reached for cone.                                           Blocks- Pt used RUE to pinch block w/ thumb and pointer finger. Pt required increased time, verbal cues, and min A to " LELA was able to secure appointment for Nirali for her first Covid-19 vaccine at 2 pm tomorrow  3/12/21 at [facility].  Patient was instructed on location, and to bring ID and face mask.  Advised that she may have to remain up to 30 minutes after shot.   position hand correctly. Pt able to grasp and  blocks with siginficant effort.                                      Rings- Pt collected rings off table to and place them on cones. Pt required min A to grasp rings.  (Pt demonstrated most difficulty with isolating wrist and finger movments. Pt required verbal and tactile cues to not use shoulder and bicep and instead extend wrist and fingers.)   Interdisciplinary Plan of Care Collaboration   IDT Collaboration with  Speech Therapist   Patient Position at End of Therapy Seated;Other (Comments)  (in speech gym waiting for SLP)   Collaboration Comments transition of care     Vision   Pt able to follow directions for saccades and visual tracking today. Pt cont to show difficult tracking to the R with the right eye. Pt had slight nystagmus in R eye when tracking horizontally to the R. Pt reports double vision has improved since eval and now to only when she looks right. Pt reports acuity cont to be worse than before the stroke.     Assessment    Pt tolerated session well focused on RUE function, RUE strength, and vision. Pt cont to demonstrate increased function in RUE and improvements w/ vision. Pt has a difficulty time isolating muscle in RUE to complete grasp and reaching tasks. Pt able to pinch and  block with increased focus, increased time, and min assistance for hand positioning.   Strengths: Able to follow instructions, Alert and oriented, Effective communication skills, Good insight into deficits/needs, Independent prior level of function, Making steady progress towards goals, Manages pain appropriately, Motivated for self care and independence, Pleasant and cooperative, Supportive family, Willingly participates in therapeutic activities  Barriers: Decreased endurance, Impaired activity tolerance, Impaired balance, Limited mobility, Hemiparesis    Plan    RUE Neuro re-ed, Vision (double vision/tracking/acuity), functional cognition, ADLs, activity  tolerance. monitor BP for hypertension. Pt is not always symptomatic.     Passport items to be completed:  Perform bathroom transfers, complete dressing, complete feeding, get ready for the day, prepare a simple meal, participate in household tasks, adapt home for safety needs, demonstrate home exercise program, complete caregiver training     Occupational Therapy Goals (Active)       Problem: Bathing       Dates: Start: 02/07/23         Goal: STG-Within one week, patient will bathe w/ min A.        Dates: Start: 02/07/23               Problem: Dressing       Dates: Start: 02/07/23         Goal: STG-Within one week, patient will dress UB w/ supervision.        Dates: Start: 02/07/23            Goal: STG-Within one week, patient will dress LB w/ min A.        Dates: Start: 02/07/23               Problem: Functional Transfers       Dates: Start: 02/07/23         Goal: STG-Within one week, patient will transfer to toilet w/ CGA.       Dates: Start: 02/07/23            Goal: STG-Within one week, patient will transfer to step in shower w/ CGA.       Dates: Start: 02/07/23               Problem: OT Long Term Goals       Dates: Start: 02/07/23         Goal: LTG-By discharge, patient will complete basic self care tasks w/ mod I - min A.       Dates: Start: 02/07/23            Goal: LTG-By discharge, patient will perform bathroom transfers w/ mod I - min A.       Dates: Start: 02/07/23               Problem: Toileting       Dates: Start: 02/07/23         Goal: STG-Within one week, patient will complete toileting tasks w/ min.       Dates: Start: 02/07/23

## 2023-02-09 NOTE — CARE PLAN
"  Problem: Hemodynamics  Goal: Patient's hemodynamics, fluid balance and neurologic status will be stable or improve  Outcome: Progressing  Note: Blood pressure cont monitored , started on hydralazine 25 mg ATC. [ [  See flow sheet]    Problem: Fall Risk - Rehab  Goal: Patient will remain free from falls  2/9/2023 0221 by Erna Vazquez R.N.  Note: Karime Olivarez Fall risk Assessment Score: 12    Moderate fall risk Interventions  - Bed and strip alarm   - Yellow sign by the door   - Yellow wrist band \"Fall risk\"  - Room near to the nurse station  - Do not leave patient unattended in the bathroom  - Fall risk education provided  - 3 side rails up       The patient is Stable - Low risk of patient condition declining or worsening     Progress made toward(s) clinical / shift goals:  Pt free from fall and injury.    "

## 2023-02-10 PROCEDURE — 700102 HCHG RX REV CODE 250 W/ 637 OVERRIDE(OP): Performed by: PHYSICAL MEDICINE & REHABILITATION

## 2023-02-10 PROCEDURE — 97130 THER IVNTJ EA ADDL 15 MIN: CPT

## 2023-02-10 PROCEDURE — 770010 HCHG ROOM/CARE - REHAB SEMI PRIVAT*

## 2023-02-10 PROCEDURE — 97112 NEUROMUSCULAR REEDUCATION: CPT

## 2023-02-10 PROCEDURE — 90791 PSYCH DIAGNOSTIC EVALUATION: CPT | Performed by: PSYCHOLOGIST

## 2023-02-10 PROCEDURE — 97129 THER IVNTJ 1ST 15 MIN: CPT

## 2023-02-10 PROCEDURE — 97032 APPL MODALITY 1+ESTIM EA 15: CPT

## 2023-02-10 PROCEDURE — 99232 SBSQ HOSP IP/OBS MODERATE 35: CPT | Performed by: PHYSICAL MEDICINE & REHABILITATION

## 2023-02-10 PROCEDURE — A9270 NON-COVERED ITEM OR SERVICE: HCPCS | Performed by: PHYSICAL MEDICINE & REHABILITATION

## 2023-02-10 PROCEDURE — 700111 HCHG RX REV CODE 636 W/ 250 OVERRIDE (IP): Performed by: PHYSICAL MEDICINE & REHABILITATION

## 2023-02-10 PROCEDURE — 97116 GAIT TRAINING THERAPY: CPT

## 2023-02-10 RX ORDER — BACLOFEN 10 MG/1
5 TABLET ORAL 3 TIMES DAILY
Status: DISCONTINUED | OUTPATIENT
Start: 2023-02-10 | End: 2023-02-13

## 2023-02-10 RX ORDER — HYDRALAZINE HYDROCHLORIDE 25 MG/1
50 TABLET, FILM COATED ORAL EVERY 8 HOURS
Status: DISCONTINUED | OUTPATIENT
Start: 2023-02-10 | End: 2023-02-13

## 2023-02-10 RX ADMIN — ATORVASTATIN CALCIUM 80 MG: 40 TABLET, FILM COATED ORAL at 21:00

## 2023-02-10 RX ADMIN — CLONIDINE 1 PATCH: 0.1 PATCH TRANSDERMAL at 11:34

## 2023-02-10 RX ADMIN — BACLOFEN 5 MG: 10 TABLET ORAL at 22:25

## 2023-02-10 RX ADMIN — BACLOFEN 5 MG: 10 TABLET ORAL at 11:34

## 2023-02-10 RX ADMIN — AMLODIPINE BESYLATE 10 MG: 5 TABLET ORAL at 05:26

## 2023-02-10 RX ADMIN — SENNOSIDES AND DOCUSATE SODIUM 2 TABLET: 50; 8.6 TABLET ORAL at 09:03

## 2023-02-10 RX ADMIN — BACLOFEN 5 MG: 10 TABLET ORAL at 15:19

## 2023-02-10 RX ADMIN — HYDRALAZINE HYDROCHLORIDE 50 MG: 25 TABLET, FILM COATED ORAL at 15:15

## 2023-02-10 RX ADMIN — SENNOSIDES AND DOCUSATE SODIUM 2 TABLET: 50; 8.6 TABLET ORAL at 22:25

## 2023-02-10 RX ADMIN — LOSARTAN POTASSIUM 100 MG: 25 TABLET, FILM COATED ORAL at 05:24

## 2023-02-10 RX ADMIN — SODIUM CHLORIDE 2 G: 1 TABLET ORAL at 11:34

## 2023-02-10 RX ADMIN — GABAPENTIN 100 MG: 100 CAPSULE ORAL at 15:15

## 2023-02-10 RX ADMIN — GABAPENTIN 100 MG: 100 CAPSULE ORAL at 09:03

## 2023-02-10 RX ADMIN — HYDRALAZINE HYDROCHLORIDE 50 MG: 25 TABLET, FILM COATED ORAL at 22:24

## 2023-02-10 RX ADMIN — ENOXAPARIN SODIUM 40 MG: 40 INJECTION SUBCUTANEOUS at 17:56

## 2023-02-10 RX ADMIN — GABAPENTIN 100 MG: 100 CAPSULE ORAL at 22:25

## 2023-02-10 RX ADMIN — SODIUM CHLORIDE 2 G: 1 TABLET ORAL at 17:56

## 2023-02-10 RX ADMIN — HYDRALAZINE HYDROCHLORIDE 25 MG: 25 TABLET, FILM COATED ORAL at 05:23

## 2023-02-10 RX ADMIN — SODIUM CHLORIDE 2 G: 1 TABLET ORAL at 09:03

## 2023-02-10 ASSESSMENT — GAIT ASSESSMENTS
DISTANCE (FEET): 15
ASSISTIVE DEVICE: FRONT WHEEL WALKER
GAIT LEVEL OF ASSIST: TOTAL ASSIST X 2

## 2023-02-10 NOTE — THERAPY
Physical Therapy   Daily Treatment     Patient Name: Anali Pan  Age:  57 y.o., Sex:  female  Medical Record #: [id number]  Today's Date: 2/10/2023     Precautions  Precautions: Fall Risk  Comments: R hemiplegia, absent R UE/LE light touch sensation    Subjective    Pt reporting improvement in R UE pain. Enjoys use of mirror for increased feedback due to poor R hemibody sensation. Pt has swimsuit in room to initiate aqua tx on Monday 2/13     Objective       02/10/23 0901   PT Charge Group   PT Gait Training (Units) 1   PT Neuromuscular Re-Education / Balance (Units) 3   PT Total Time Spent   PT Individual Total Time Spent (Mins) 60   Gait Functional Level of Assist    Gait Level Of Assist Total Assist X 2  (mod-maxA with WC follow)   Assistive Device Front Wheel Walker   Distance (Feet) 15   # of Times Distance was Traveled 3   Deviation Step To;Decreased Base Of Support;Ataxic  (overshift R, R pelvic retraction in stance, R knee hyperextension, overshooting and adduction in R swing, assist to weightshift L for R swing, facilitation for R hip ext in stance)   Transfer Functional Level of Assist   Bed, Chair, Wheelchair Transfer Contact Guard Assist   Bed Chair Wheelchair Transfer Description Squat pivot transfer to wheelchair;Increased time;Set-up of equipment;Supervision for safety;Verbal cueing  (VC's to use R UE to assist with liftoff)   Bed Mobility    Supine to Sit Independent   Sit to Supine Independent   Sit to Stand Contact Guard Assist   Scooting Supervised   Rolling Supervised   Neuro-Muscular Treatments   Comments PNF R LE flex/add dynamic reversals, qped lateral trunk flexion to R, qped L UE reaching with stabilization at R UE, seated scooting with R wrist stabilization and facilitation of use of R UE   Interdisciplinary Plan of Care Collaboration   IDT Collaboration with  Nursing;Physician;Physical Therapist   Patient Position at End of Therapy Seated;Chair Alarm On;Self Releasing Lap Belt  Applied;Call Light within Reach;Tray Table within Reach;Phone within Reach   Collaboration Comments RN re HTN, MD re tone/spasticity in fx, PT re scheduling for aqua tx     SPT Jolene Lucas present and participating in pt care with gait training and PNF NRE skills    Pt demonstrated ability to propel WC 200ft with SPV and hemitechnique    Assessment    Pt demonstrated improved ability to maintain R UE grasp on FWW though cont to require assist for steering. Pt demonstrated incr R extensor synergy during gait training with improvement after mat NRE. Pt demonstrated improved carryover of STS sequencing requiring 2 verbal cues. Pt is very motivated and expressing desire to participate in extra tx when scheduling permits.  Strengths: Able to follow instructions, Alert and oriented, Good endurance, Independent prior level of function, Making steady progress towards goals, Motivated for self care and independence, Pleasant and cooperative, Supportive family, Willingly participates in therapeutic activities  Barriers: Hemiplegia, Impaired balance, Visual impairment, Hypertension, R inattention, Decreased insight    Plan    Aqua tx, pre-gait/gait trg FWW with mirror feedback and emphasis on weight shift L, NRE R LE and trunk, squat pivot transfer training    Passport items to be completed:  Get in/out of bed safely, in/out of a vehicle, safely use mobility device, walk or wheel around home/community, navigate up and down stairs, show how to get up/down from the ground, ensure home is accessible, demonstrate HEP, complete caregiver training    Physical Therapy Problems (Active)       Problem: Mobility       Dates: Start: 02/07/23         Goal: STG-Within one week, patient will propel wheelchair 100ft with SPV       Dates: Start: 02/07/23            Goal: STG-Within one week, patient will ambulate 15ft with LRAD with modA and WC follow       Dates: Start: 02/07/23               Problem: Mobility Transfers       Dates: Start:  02/07/23         Goal: STG-Within one week, patient will perform bed mobility independently       Dates: Start: 02/07/23            Goal: STG-Within one week, patient will transfer bed to chair with CGA       Dates: Start: 02/07/23               Problem: PT-Long Term Goals       Dates: Start: 02/07/23         Goal: LTG-By discharge, patient will propel wheelchair 150ft [place]       Dates: Start: 02/07/23            Goal: LTG-By discharge, patient will ambulate 150ft with LRAD and CGA       Dates: Start: 02/07/23            Goal: LTG-By discharge, patient will transfer one surface to another [place]       Dates: Start: 02/07/23            Goal: LTG-By discharge, patient will perform home exercise program independently       Dates: Start: 02/07/23            Goal: LTG-By discharge, patient will ambulate up/down 1 curb step with [place]       Dates: Start: 02/07/23

## 2023-02-10 NOTE — THERAPY
"Speech Language Pathology  Daily Treatment     Patient Name: Anali Pan  Age:  57 y.o., Sex:  female  Medical Record #: [id number]  Today's Date: 2/10/2023     Precautions  Precautions: Fall Risk  Comments: R hemiplegia, absent R UE/LE light touch sensation    Subjective    \"I don't know why I need to do speech therapy, I've been flying through everything they're giving me.  I need to focus on this\" (indicating she needs to work on strengthening her right arm).  Reviewed pt's initial [employer] assessment showing deficits in the areas of attention, problem solving, memory and oral expression.  Pt willing to participate.       Objective       02/10/23 1101   Treatment Charges   SLP Cognitive Skill Development First 15 Minutes 1   SLP Cognitive Skill Development Additional 15 Minutes 1   SLP Total Time Spent   SLP Individual Total Time Spent (Mins) 30         Assessment    Pt completed 2 different alternating attention tasks this session.  The first task required pt to identify letters of the alphabet in order within a paragraph of letters, pt completed this task independently with 100% accuracy.  The second task required pt to follow directions to connect different colored dots in a specific order.  Pt completed approximately 50% of this task accurately; however demonstrated difficulty following the specific directions in the middle of this task.  Pt was encouraged to speak with her primary ST this afternoon about reducing ST time.      Strengths: Able to follow instructions, Willingly participates in therapeutic activities, Motivated for self care and independence, Pleasant and cooperative, Independent prior level of function  Barriers: Impaired insight/denial of deficits, Aspiration risk    Plan    Continue targeting alternating attention, higher level word finding.         Speech Therapy Problems (Active)       Problem: Comprehension STGs       Dates: Start: 02/07/23         Goal: STG-Within one week, patient will " follow 2-step directions with 80% accuracy given min A       Dates: Start: 02/07/23               Problem: Expression STGs       Dates: Start: 02/07/23         Goal: STG-Within one week, patient will complete word-finding tasks (generative naming, categories) with 80% accuracy given min A       Dates: Start: 02/07/23               Problem: Memory STGs       Dates: Start: 02/07/23         Goal: STG-Within one week, patient will demonstrate delayed recall of new information with 80% accuracy with min A       Dates: Start: 02/07/23               Problem: Problem Solving STGs       Dates: Start: 02/07/23         Goal: STG-Within one week, patient will demonstrate sustained or alternating attention during structured tasks with 80% accuracy given min A        Dates: Start: 02/07/23               Problem: Speech/Swallowing LTGs       Dates: Start: 02/07/23         Goal: LTG-By discharge, patient will tolerate least restrictive diet with no s/s of aspiration        Dates: Start: 02/07/23            Goal: LTG-By discharge, patient will comprehend verbal and written information with 90% accuracy with mod I       Dates: Start: 02/07/23            Goal: LTG-By discharge, patient will express wants, needs, thoughts, and ideas with 90% accuracy with mod I       Dates: Start: 02/07/23            Goal: LTG-By discharge, patient will solve complex problems in structured and functional situations with 90% accuracy with spv        Dates: Start: 02/07/23               Problem: Swallowing STGs       Dates: Start: 02/07/23         Goal: STG-Within one week, patient will tolerated upgraded 7- Regular Textures  0-Thin liquids  diet with no s/s of aspiration        Dates: Start: 02/07/23

## 2023-02-10 NOTE — THERAPY
Occupational Therapy  Daily Treatment     Patient Name: Anali Pan  Age:  57 y.o., Sex:  female  Medical Record #: [id number]  Today's Date: 2/10/2023     Precautions  Precautions: (P) Fall Risk  Comments: (P) R hemiplegia, absent R UE/LE light touch sensation         Subjective    Pt is agreeable to do  as extra therapy with techs 3X/week.      Objective       02/10/23 1231   OT Charge Group   Charges Yes   OT Electrical Stimulation Attended (Units) 1   OT Neuromuscular Re-education / Balance (Units) 3   OT Total Time Spent   OT Individual Total Time Spent (Mins) 60   Precautions   Precautions Fall Risk   Comments R hemiplegia, absent R UE/LE light touch sensation   Neuro-Muscular Treatments   Comments  set up, seeenote for details   Interdisciplinary Plan of Care Collaboration   Patient Position at End of Therapy Seated;Call Light within Reach;Tray Table within Reach;Phone within Reach       Pt set up on RT-300 FES for rightUE neuro re-ed, ROM and sensory input.  Electrodes applied to right scap stabilizers, shoulder, biceps, triceps, and wrist flexors/extensors. right UE placed in arm trough with ace wrap applied at right wrist in boxers wrap style for increased stability while allowing for finger flex/ext with stimulation. Adjusted  to improved overall positioning and posture for improved performance and comfort. Muscle stimulation parameters assessed for each muscle group to pt tolerance to e-stim and muscle contraction observed. Pt tolerated well with no complaints of pain.    Minutes of Cycling   *including warmup and cool down 17.07 minutes   Distance Traveled 1.77 miles   Energy Expended 0.3 kCal   Energy Per Hour 1.4 kCal/hr   Avg Power 1.7 W       Avg Asymmetry L 17% %          Assessment    Pt tolerated  set-up and stimulation well. Pt is very motivated to increase function of RUE and is very interested in getting extra therapy to work on . Pt did not complain of pain or  discomfort throughout session.    Strengths: Able to follow instructions, Alert and oriented, Effective communication skills, Good insight into deficits/needs, Independent prior level of function, Making steady progress towards goals, Manages pain appropriately, Motivated for self care and independence, Pleasant and cooperative, Supportive family, Willingly participates in therapeutic activities  Barriers: Decreased endurance, Impaired activity tolerance, Impaired balance, Limited mobility, Hemiparesis    Plan    RUE Neuro re-ed, Vision (double vision/tracking/acuity), functional cognition, ADLs, activity tolerance. monitor BP for hypertension. Pt is not always symptomatic.   3x/wk w/ tech     Passport items to be completed:  Perform bathroom transfers, complete dressing, complete feeding, get ready for the day, prepare a simple meal, participate in household tasks, adapt home for safety needs, demonstrate home exercise program, complete caregiver training        Occupational Therapy Goals (Active)       Problem: Bathing       Dates: Start: 02/07/23         Goal: STG-Within one week, patient will bathe w/ min A.        Dates: Start: 02/07/23               Problem: Dressing       Dates: Start: 02/07/23         Goal: STG-Within one week, patient will dress UB w/ supervision.        Dates: Start: 02/07/23            Goal: STG-Within one week, patient will dress LB w/ min A.        Dates: Start: 02/07/23               Problem: Functional Transfers       Dates: Start: 02/07/23         Goal: STG-Within one week, patient will transfer to toilet w/ CGA.       Dates: Start: 02/07/23            Goal: STG-Within one week, patient will transfer to step in shower w/ CGA.       Dates: Start: 02/07/23               Problem: OT Long Term Goals       Dates: Start: 02/07/23         Goal: LTG-By discharge, patient will complete basic self care tasks w/ mod I - min A.       Dates: Start: 02/07/23            Goal: LTG-By discharge,  patient will perform bathroom transfers w/ mod I - min A.       Dates: Start: 02/07/23               Problem: Toileting       Dates: Start: 02/07/23         Goal: STG-Within one week, patient will complete toileting tasks w/ min.       Dates: Start: 02/07/23

## 2023-02-10 NOTE — PROGRESS NOTES
"Rehab Progress Note     Date of Service: 2/10/2023  Chief Complaint: follow up stroke    Interval Events (Subjective)    Patient seen and examined today in the therapy gym.  She reports decreased pain in her right arm.  She and the therapist agree she has been having some spasms of her right leg.  She has been cleared to start aqua therapy.  Her blood pressure has improved.    Patient has no other new questions, concerns, or complaints today.       ROS: No changes to bowel, bladder, pain, mood, or sleep.             Objective:  VITAL SIGNS: /85   Pulse 74   Temp 36.7 °C (98 °F) (Oral)   Resp 18   Ht 1.676 m (5' 6\")   Wt 80.2 kg (176 lb 14.4 oz)   LMP  (LMP Unknown)   SpO2 96%   BMI 28.55 kg/m²   Gen: alert, no apparent distress  CV: Regular rate, regular rhythm  Resp: Clear to auscultation bilaterally  Neuro: Right hemiparesis, right hyperreflexia    Recent Results (from the past 72 hour(s))   SODIUM SERUM (NA)    Collection Time: 02/09/23  5:22 AM   Result Value Ref Range    Sodium 135 135 - 145 mmol/L       Scheduled Medications   Medication Dose Frequency    hydrALAZINE  50 mg Q8HRS    baclofen  5 mg TID    gabapentin  100 mg TID    senna-docusate  2 Tablet BID    And    polyethylene glycol/lytes  1 Packet DAILY    losartan  100 mg Q DAY    sodium chloride  2 g TID WITH MEALS    atorvastatin  80 mg Q EVENING    amLODIPine  10 mg Q DAY    cloNIDine  1 Patch Q7 DAYS    enoxaparin (LOVENOX) injection  40 mg DAILY AT 1800       Current Diet Order   Procedures    Diet Order Diet: Regular (Meds one at a time with liquid wash, larger pills in applesauce); Fluid modifications: (optional): 1500 ml Fluid Restriction       Assessment:    This patient is a 57 y.o. female admitted for acute inpatient rehabilitation with Hemorrhagic stroke (HCC).    I led and attended the weekly conference, and agree with the IDT conference documentation and plan of care as noted below.    Date of conference: 2/8/2023    Goals " and barriers: See IDT note.    Biggest barriers: Right hemiparesis, impulsive    CM/social support: Family to provide 24/7    Anticipated DC date: 3/7    Home health: PT/OT/SLP/RN    Equip: To be determined    Follow up: PCP, [facility], Dr. De La Torre      Problem List/Medical Decision Making and Plan:    Left basal hemorrhagic ganglia stroke  From uncontrolled hypertension  Right hemiparesis, continues  Right sarahy-anesthesia, continues  Cognitive impairment  Dysphagia, resolved  Cognitive impairment, poor insight  Impulsive  Mild expressive aphasia/anomia  Visual impairment  Double vision  Right neglect  Continue full rehab program  PT/OT/SLP, 1 hr each discipline, 5 days per week  Aqua therapy  Recreational therapy    Outpatient follow up with stroke [facility], Dr. De La Torre/PMR, referrals made    Neuropathic pain, improved  In the right arm  Started low-dose gabapentin 100 mg 3 times daily  Monitor need to increase dosing    Spasticity  Start low-dose baclofen 5 mg 3 times daily    Hyperlipidemia    Statin, increase dose to 80 g  Goal <70    Hypertension, improved  Clonidine, will attempt to titrate off, not good for stroke recovery  Amlodipine 10 mg, max dose  Losartan, dose increased 50 --> 75 mg --> 100 mg  Added scheduled hydralazine 10 mg TID --> 25 mg TID  Titrate off salt tabs soon as possible     Alcohol use  Will need counseling  Neuro-psychologist consulted     PFO  Not cause of stroke  Outpatient follow up     Hyponatremia, improved  Likely SIADH from hemorrhage  Titrate off salt tabs  Current Na 135, continue current dosing   Recheck sodium on Monday    Constipation, resolved  Increased bowel medications  Last BM 2/9    DVT prophylaxis  Lovenox    Vanessa Huizar M.D.  Physical Medicine and Rehabilitation

## 2023-02-10 NOTE — THERAPY
Physical Therapy   Daily Treatment     Patient Name: Anali Pan  Age:  57 y.o., Sex:  female  Medical Record #: [id number]  Today's Date: 2/9/2023     Precautions  Precautions: Fall Risk  Comments: R hemiplegia, absent R UE/LE light touch sensation    Subjective    Pt reporting improving sensation in R UE though increase in pain as well. Pt also expressing frustration with lack of sensation and not trusting R LE. Eager to start aquatic adjunct tx once HTN is better managed     Objective       02/09/23 1401   PT Charge Group   PT Gait Training (Units) 1   PT Neuromuscular Re-Education / Balance (Units) 1   PT Therapeutic Activities (Units) 2   PT Total Time Spent   PT Individual Total Time Spent (Mins) 60   Vitals   Pulse 83   Patient BP Position Sitting   Blood Pressure (!) 149/101   Vitals Comments prior to tx   Non Verbal Descriptors   Non Verbal Scale  Moaning;Grimacing  (due to neuropathic pain in R UE)   Gait Functional Level of Assist    Gait Level Of Assist Total Assist X 2  (modA with WC follow)   Assistive Device Front Wheel Walker  (R hand trough)   Distance (Feet) 15   # of Times Distance was Traveled 2   Deviation Step To;Decreased Base Of Support;Bradykinetic  (overshift R, R pelvic retraction in stance, R knee hyperextension, overshooting and adduction in R swing, assist to weightshift L for R swing, facilitation for R hip ext in stance)   Wheelchair Functional Level of Assist   Wheelchair Assist Supervised   Distance Wheelchair (Feet or Distance) 200   Wheelchair Description Verbal cueing  (cues to scan R and use L UE with hemipropulsion, level ground in room and halls)   Transfer Functional Level of Assist   Bed, Chair, Wheelchair Transfer Contact Guard Assist   Bed Chair Wheelchair Transfer Description Verbal cueing;Supervision for safety;Squat pivot transfer to wheelchair;Initial preparation for task;Increased time;Set-up of equipment  (max VCs for sequencing and staying low)   Bed Mobility     Sit to Stand Contact Guard Assist  (WC>FWW and mat>FWW, mod VC's so sequencing)   Neuro-Muscular Treatments   Comments sequencing to STS with FWW, pregait at rail for incr R hip extension in stance   Interdisciplinary Plan of Care Collaboration   IDT Collaboration with  Physician;Family / Caregiver   Patient Position at End of Therapy Seated;Chair Alarm On;Self Releasing Lap Belt Applied;Call Light within Reach;Phone within Reach;Tray Table within Reach;Family / Friend in Room   Collaboration Comments pt's daughter present for duration of session, notified MD regarding incr R UE tone and pt self report of R LE extensor synergy spasms     Observed incr R finger/wrist flexor, elbow flexor, and shoulder flexor tone compared to eval.   Supplied R arm trough for improved support while seated in WC    Assessment    Pt demonstrated increased R UE tone with report of R LE extensor spasms occurring. Pt is making steady progress toward her fxl mobility goals, though she benefits from repetition due to mild aphasia, impaired memory with new fxl learning, and R inattention in function. She demonstrates improved activation of R quad and glute in static standing with mirror feedback.  Strengths: Able to follow instructions, Alert and oriented, Good endurance, Independent prior level of function, Making steady progress towards goals, Motivated for self care and independence, Pleasant and cooperative, Supportive family, Willingly participates in therapeutic activities  Barriers: Hemiplegia, Impaired balance, Visual impairment, Hypertension    Plan    NRE for R stance stability in gait, forced use R LE in fxl mobility, monitor tone    Passport items to be completed:  Get in/out of bed safely, in/out of a vehicle, safely use mobility device, walk or wheel around home/community, navigate up and down stairs, show how to get up/down from the ground, ensure home is accessible, demonstrate HEP, complete caregiver training    Physical  Therapy Problems (Active)       Problem: Mobility       Dates: Start: 02/07/23         Goal: STG-Within one week, patient will propel wheelchair 100ft with SPV       Dates: Start: 02/07/23            Goal: STG-Within one week, patient will ambulate 15ft with LRAD with modA and WC follow       Dates: Start: 02/07/23               Problem: Mobility Transfers       Dates: Start: 02/07/23         Goal: STG-Within one week, patient will perform bed mobility independently       Dates: Start: 02/07/23            Goal: STG-Within one week, patient will transfer bed to chair with CGA       Dates: Start: 02/07/23               Problem: PT-Long Term Goals       Dates: Start: 02/07/23         Goal: LTG-By discharge, patient will propel wheelchair 150ft [place]       Dates: Start: 02/07/23            Goal: LTG-By discharge, patient will ambulate 150ft with LRAD and CGA       Dates: Start: 02/07/23            Goal: LTG-By discharge, patient will transfer one surface to another [place]       Dates: Start: 02/07/23            Goal: LTG-By discharge, patient will perform home exercise program independently       Dates: Start: 02/07/23            Goal: LTG-By discharge, patient will ambulate up/down 1 curb step with [place]       Dates: Start: 02/07/23

## 2023-02-10 NOTE — CARE PLAN
Safety measures enforced, pt compliant with safety measures.  Problem: Knowledge Deficit - Standard  Goal: Patient and family/care givers will demonstrate understanding of plan of care, disease process/condition, diagnostic tests and medications  Outcome: Progressing  . Note: Plan of care reviewed, verbalized understanding. Pt can slowly move her right arms.Pt happy with the progress of her rehab.      Problem: Hemodynamics  Goal: Patient's hemodynamics, fluid balance and neurologic status will be stable or improve  Outcome: Progressing  Note: Blood pressure cont monitored, due bp meds given.Pt stabe, pain under control, needs anticipated.   The patient is Stable - Low risk of patient condition declining or worsening     Progress made toward(s) clinical / shift goals:  Pt free from fall and injury.

## 2023-02-10 NOTE — THERAPY
Recreational Therapy  Daily Treatment     Patient Name: Anali Pan  AGE:  57 y.o., SEX:  female  Medical Record #: [id number]  Today's Date: 2/10/2023       Subjective    Patient was reading and willing for rec therapy session.      Objective       02/10/23 1031   Procedural Tracking   Procedural Tracking Leisure Skills Awareness;Leisure Skills Development;Social Skills Training;Cognitive Skills Training;Gross Motor Functional Leisure Skills;Fine Motor Functional Leisure Skills   Treatment Time   Total Time Spent (mins) 30   Total Time Missed 0   Functional Ability Status - Cognitive   Attention Span Remains on Task with Cueing   Comprehension Follows Two Step Commands   Judgment Able to Make Independent Decisions   Functional Ability Status - Emotional    Affect Appropriate;Bright   Mood Appropriate   Behavior Appropriate;Cooperative   Leisure Competence Measure   Leisure Awareness Cueing Assist   Leisure Attitude Independent   Leisure Skills Independent   Cultural / Social Behaviors Independent   Interpersonal Skills Independent   Skilled Intervention    Skilled Intervention Gross Motor Leisure;Fine Motor Leisure;Cognitive Leisure   Strengths & Barriers   Strengths Able to follow instructions;Alert and oriented;Willingly participates in therapeutic activities;Pleasant and cooperative;Making steady progress towards goals;Motivated for self care and independence         Assessment    Patient is 57 y.o. female with a past medical history of hypertension noncompliant on antihypertensive medications and a diagnosis of Stroke: Right Body Involvement (Left Brain).      Patient participated in previously learned game, BigDoor, during recreation therapy session. Patient required min cueing for scanning, sequencing and engagement of RUE. Patient benefited from increased processing time. Patient was asked to use RUE to move 4 game tiles which was completed successfully. Patient raised RUE with mod I to signal end of her  turn.     Strengths: Able to follow instructions, Alert and oriented, Willingly participates in therapeutic activities, Pleasant and cooperative, Making steady progress towards goals, Motivated for self care and independence  Barriers: Decreased endurance, Generalized weakness, Impaired balance    Plan    Patient will benefit from continued recreation therapy during her stay, 2-3 times per week. Patient is making steady progress towards goals.     Passport items to be completed:  Verbalize two positive leisure activities, discuss returning to work, hobbies, community groups or volunteer activities, explore community resources

## 2023-02-11 PROCEDURE — 97130 THER IVNTJ EA ADDL 15 MIN: CPT

## 2023-02-11 PROCEDURE — 700102 HCHG RX REV CODE 250 W/ 637 OVERRIDE(OP): Performed by: PHYSICAL MEDICINE & REHABILITATION

## 2023-02-11 PROCEDURE — 97530 THERAPEUTIC ACTIVITIES: CPT

## 2023-02-11 PROCEDURE — 92507 TX SP LANG VOICE COMM INDIV: CPT

## 2023-02-11 PROCEDURE — 97116 GAIT TRAINING THERAPY: CPT

## 2023-02-11 PROCEDURE — 97129 THER IVNTJ 1ST 15 MIN: CPT

## 2023-02-11 PROCEDURE — 700111 HCHG RX REV CODE 636 W/ 250 OVERRIDE (IP): Performed by: PHYSICAL MEDICINE & REHABILITATION

## 2023-02-11 PROCEDURE — A9270 NON-COVERED ITEM OR SERVICE: HCPCS | Performed by: PHYSICAL MEDICINE & REHABILITATION

## 2023-02-11 PROCEDURE — 97535 SELF CARE MNGMENT TRAINING: CPT

## 2023-02-11 PROCEDURE — 97112 NEUROMUSCULAR REEDUCATION: CPT

## 2023-02-11 PROCEDURE — 770010 HCHG ROOM/CARE - REHAB SEMI PRIVAT*

## 2023-02-11 RX ADMIN — HYDRALAZINE HYDROCHLORIDE 50 MG: 25 TABLET, FILM COATED ORAL at 15:46

## 2023-02-11 RX ADMIN — POLYETHYLENE GLYCOL 3350 1 PACKET: 17 POWDER, FOR SOLUTION ORAL at 08:48

## 2023-02-11 RX ADMIN — HYDRALAZINE HYDROCHLORIDE 50 MG: 25 TABLET, FILM COATED ORAL at 20:24

## 2023-02-11 RX ADMIN — SENNOSIDES AND DOCUSATE SODIUM 2 TABLET: 50; 8.6 TABLET ORAL at 08:45

## 2023-02-11 RX ADMIN — HYDRALAZINE HYDROCHLORIDE 50 MG: 25 TABLET, FILM COATED ORAL at 05:19

## 2023-02-11 RX ADMIN — LOSARTAN POTASSIUM 100 MG: 25 TABLET, FILM COATED ORAL at 05:19

## 2023-02-11 RX ADMIN — ENOXAPARIN SODIUM 40 MG: 40 INJECTION SUBCUTANEOUS at 18:30

## 2023-02-11 RX ADMIN — AMLODIPINE BESYLATE 10 MG: 5 TABLET ORAL at 05:19

## 2023-02-11 ASSESSMENT — GAIT ASSESSMENTS
DEVIATION: DECREASED BASE OF SUPPORT;ATAXIC
GAIT LEVEL OF ASSIST: MINIMAL ASSIST
DISTANCE (FEET): 25
ASSISTIVE DEVICE: QUAD CANE

## 2023-02-11 ASSESSMENT — ACTIVITIES OF DAILY LIVING (ADL): TOILET_TRANSFER_DESCRIPTION: GRAB BAR;INCREASED TIME;INITIAL PREPARATION FOR TASK;SUPERVISION FOR SAFETY

## 2023-02-11 ASSESSMENT — PATIENT HEALTH QUESTIONNAIRE - PHQ9
1. LITTLE INTEREST OR PLEASURE IN DOING THINGS: NOT AT ALL
SUM OF ALL RESPONSES TO PHQ9 QUESTIONS 1 AND 2: 0
2. FEELING DOWN, DEPRESSED, IRRITABLE, OR HOPELESS: NOT AT ALL

## 2023-02-11 NOTE — CARE PLAN
The patient is Stable - Low risk of patient condition declining or worsening         Progress made toward(s) clinical / shift goals:      Problem: Knowledge Deficit - Standard  Goal: Patient and family/care givers will demonstrate understanding of plan of care, disease process/condition, diagnostic tests and medications  Outcome: Progressing     Problem: Respiratory  Goal: Patient will understand use and administration of respiratory medications to improve respiratory function  Outcome: Progressing     Problem: Bladder / Voiding  Goal: Patient will establish and maintain regular urinary output  Outcome: Progressing       Patient is not progressing towards the following goals:

## 2023-02-11 NOTE — THERAPY
"Occupational Therapy  Daily Treatment     Patient Name: Anali Pan  Age:  57 y.o., Sex:  female  Medical Record #: [id number]  Today's Date: 2/11/2023     Precautions  Precautions: Fall Risk  Comments: R hemiplegia, absent R UE/LE light touch sensation         Subjective    \"My vision is still a little off and I run into things on my R side at times, but it's much better than it used to be.\"      Objective       02/11/23 0901   OT Charge Group   OT Self Care / ADL (Units) 1   OT Therapy Activity (Units) 1   OT Total Time Spent   OT Individual Total Time Spent (Mins) 30   Pain   Intervention Declines   Functional Level of Assist   Grooming Standby Assist   Grooming Description Increased time;Initial preparation for task;Seated in wheelchair at sink  (wash hands at sink side)   Toileting Moderate Assist   Toileting Description Assist to pull pants up;Assist to pull pants down;Grab bar;Increased time;Initial preparation for task   Toilet Transfers Contact Guard Assist   Toilet Transfer Description Grab bar;Increased time;Initial preparation for task;Supervision for safety   Interdisciplinary Plan of Care Collaboration   Patient Position at End of Therapy Seated;Chair Alarm On;Self Releasing Lap Belt Applied;Call Light within Reach;Tray Table within Reach;Phone within Reach     Pt reported edema in RLE- educated pt on elevating leg throughout the day on bed/chair and instructed pt, that therapist could obtain elevating leg rest but it is more difficulty to maneuver around within the room with elevating leg rest- pt declined at this time.     Educated pt on guided imagery for RUE neuro re-ed. Encouraged pt to use RUE for all functional tasks I.e. brushing teeth with L hand guiding R hand during task.     Assessment    Pt tolerated session well. Pt very hyper-verbose, but pleasant and motivated to work with therapy. Pt reported difficulty with pant mgmt during toileting tasks 2/2 decreased balance. Pt reported numbness " in RLE and not trusting that it will hold her weight if she weight shifts to that side during toileting due to RUE non-functional at this time. Educated pt on using her head on the wall for stabilization later once she gets more comfortable WB through RLE- with assist from therapist/staff for safety.     Strengths: Able to follow instructions, Alert and oriented, Effective communication skills, Good insight into deficits/needs, Independent prior level of function, Making steady progress towards goals, Manages pain appropriately, Motivated for self care and independence, Pleasant and cooperative, Supportive family, Willingly participates in therapeutic activities  Barriers: Decreased endurance, Impaired activity tolerance, Impaired balance, Limited mobility, Hemiparesis    Plan    RUE Neuro re-ed, Vision (double vision/tracking/acuity), functional cognition, ADLs, activity tolerance. monitor BP for hypertension. Pt is not always symptomatic.   3x/wk w/ tech     Passport items to be completed:  Perform bathroom transfers, complete dressing, complete feeding, get ready for the day, prepare a simple meal, participate in household tasks, adapt home for safety needs, demonstrate home exercise program, complete caregiver training        Occupational Therapy Goals (Active)       Problem: Bathing       Dates: Start: 02/07/23         Goal: STG-Within one week, patient will bathe w/ min A.        Dates: Start: 02/07/23               Problem: Dressing       Dates: Start: 02/07/23         Goal: STG-Within one week, patient will dress UB w/ supervision.        Dates: Start: 02/07/23            Goal: STG-Within one week, patient will dress LB w/ min A.        Dates: Start: 02/07/23               Problem: Functional Transfers       Dates: Start: 02/07/23         Goal: STG-Within one week, patient will transfer to toilet w/ CGA.       Dates: Start: 02/07/23            Goal: STG-Within one week, patient will transfer to step in  shower w/ CGA.       Dates: Start: 02/07/23               Problem: OT Long Term Goals       Dates: Start: 02/07/23         Goal: LTG-By discharge, patient will complete basic self care tasks w/ mod I - min A.       Dates: Start: 02/07/23            Goal: LTG-By discharge, patient will perform bathroom transfers w/ mod I - min A.       Dates: Start: 02/07/23               Problem: Toileting       Dates: Start: 02/07/23         Goal: STG-Within one week, patient will complete toileting tasks w/ min.       Dates: Start: 02/07/23

## 2023-02-11 NOTE — THERAPY
"Physical Therapy   Daily Treatment     Patient Name: Anali Pan  Age:  57 y.o., Sex:  female  Medical Record #: [id number]  Today's Date: 2/11/2023     Precautions  Precautions: Fall Risk  Comments: R hemiplegia, absent R UE/LE light touch sensation    Subjective    \"I need to use the bathroom\".   Pt denies pain throughout session    Pt's daughter present throughout session     Objective     02/11/23 1131   PT Charge Group   PT Gait Training (Units) 3   PT Neuromuscular Re-Education / Balance (Units) 1   PT Total Time Spent   PT Individual Total Time Spent (Mins) 60   Pain 0 - 10 Group   Pain Rating Scale (NPRS) 0   Gait Functional Level of Assist    Gait Level Of Assist Minimal Assist   Assistive Device Quad Cane   Distance (Feet) 25   # of Times Distance was Traveled 10   Deviation Decreased Base Of Support;Ataxic   Interdisciplinary Plan of Care Collaboration   Patient Position at End of Therapy Seated;Chair Alarm On;Call Light within Reach;Phone within Reach;Tray Table within Reach;Family / Friend in Room   Collaboration Comments Pt's daughter present observing throughout session   Strengths & Barriers   Strengths Able to follow instructions;Alert and oriented;Effective communication skills;Good carryover of learning;Good endurance;Independent prior level of function   Barriers Generalized weakness;Hemiparesis;Impaired balance     Pt was brought to bathroom via w/c. Toilet transfer with MIN A. Total assistance for pulling down pants. Pt urinated in sitting, performed self jessica care. Transfer back to w/c with MIN A.     Pt was brought to therapy gym via w/c.     Pt ambulated one bout of 20' with RW and MIN A, she had difficulty maintaining RUE hand  of RW.     Pt ambulated 2 bouts of 30' with L handrail and MIN A w/ w/c follow of .     Pt ambulated an additional 8 bouts of 25' with NBQC variable CGA-MOD A and w/c follow of .     During all bouts of ambulation, pt demonstrates " scissoring of RLE, impaired foot placement of RLE, mild R knee instability including both buckling and hyperextension. She initially demonstrated step to however easily progressed to step through with VC/TC, 3 point gait used for all ambulation with w/ NBQC. Mirror used in front for visual feedback.     Pt performed 8 stairs with L handrail with MIN-MOD A, she required MOD VC for proper step to sequencing. Physical assistance required for pelvic stability and R foot placement.     Pt performed lateral stepping 3x30' with anterior handrail to facilitate decreased scissoring during gait, CGA throughout.       Assessment    Pt tolerated session well, she demonstrated improved ambulation status today and was able to ambulate over 10 bouts of 25-30'. Step through gait progressing as session continues.     Strengths: (P) Able to follow instructions, Alert and oriented, Effective communication skills, Good carryover of learning, Good endurance, Independent prior level of function  Barriers: (P) Generalized weakness, Hemiparesis, Impaired balance    Plan    Continue to progress functional independence, LE strength, and CV endurance in anticipation of discharge.     Passport items to be completed:  Get in/out of bed safely, in/out of a vehicle, safely use mobility device, walk or wheel around home/community, navigate up and down stairs, show how to get up/down from the ground, ensure home is accessible, demonstrate HEP, complete caregiver training    Physical Therapy Problems (Active)       Problem: Mobility       Dates: Start: 02/07/23         Goal: STG-Within one week, patient will propel wheelchair 100ft with SPV       Dates: Start: 02/07/23            Goal: STG-Within one week, patient will ambulate 15ft with LRAD with modA and WC follow       Dates: Start: 02/07/23               Problem: Mobility Transfers       Dates: Start: 02/07/23         Goal: STG-Within one week, patient will perform bed mobility independently        Dates: Start: 02/07/23            Goal: STG-Within one week, patient will transfer bed to chair with CGA       Dates: Start: 02/07/23               Problem: PT-Long Term Goals       Dates: Start: 02/07/23         Goal: LTG-By discharge, patient will propel wheelchair 150ft [place]       Dates: Start: 02/07/23            Goal: LTG-By discharge, patient will ambulate 150ft with LRAD and CGA       Dates: Start: 02/07/23            Goal: LTG-By discharge, patient will transfer one surface to another [place]       Dates: Start: 02/07/23            Goal: LTG-By discharge, patient will perform home exercise program independently       Dates: Start: 02/07/23            Goal: LTG-By discharge, patient will ambulate up/down 1 curb step with [place]       Dates: Start: 02/07/23

## 2023-02-11 NOTE — PROGRESS NOTES
PSYCHOLOGICAL CONSULTATION:  Reason for admission: Nontraumatic acute hemorrhage of basal ganglia (HCC) [I61.9]  Hemorrhagic stroke (HCC) [I61.9]  Reason for consult: Adjustment to stroke  Requesting Physician: Gaye    Legal status: Voluntary    Chief Complaint: Stroke recovery    HPI: Anali Pan is a 57 y.o.  female with a past medical history of hypertension noncompliant on antihypertensive medications and alcohol use of approximately 5-6 drinks per day;  who presented on 1/31/2023 11:17 PM as a transfer from [facility] with right-sided weakness.  Per documentation, patient had acute onset right-sided weakness and difficulty speaking, she arrived at the outside hospital with SBP up to 200.  A CT head was obtained which showed an acute left thalamic hemorrhage patient was transferred to [facility] for higher level of care.  Patient was admitted to the ICU with strict SBP goal of 100-1 40.  She was placed on a nicardipine drip for blood pressure control.  An MRI brain was obtained with evidence of an acute/subacute hemorrhage in the left thalamus adjacent to the parietal corona radiata with mass effect upon the posterior third ventricle with slight midline shift measuring 4 to 5 mm there is no evidence of abnormality relating to underlying lesion or abnormal vascularity.  Patient's hospital course has been complicated by onset of alcohol withdrawal, patient had poor participation with therapy due to anxiety and unwillingness to complete full commands for therapy session.  Patient is on a Precedex drip for agitation.  Echocardiogram was obtained with an EF of 70%, grade 1 diastolic dysfunction with evidence right right to left shift/PFO    Psychology was consulted due to difficulty with adjustment to post stroke recovery    Risk Assessment: current symptoms as reported by pt.  Suicidal Ideation: Denies    Homicidal Ideation: Denies      Psychiatric Review of Systems:current symptoms as  reported by pt.  Depression: Endorses mild depressive symptoms in the context of stroke recovery and hospitalization  Darlyn: Denies   Anxiety/Panic Attacks: Endorses mild anxiety in the context of uncertain prognosis and recovery  PTSD symptom: Denies   Psychosis: Denies   Other:         Medical Review of Systems: as reported by pt. All systems reviewed. Only those found to be + are noted below. All others are negative.   Neurological:    TBIs: Denies   SZs: Denies   Strokes: Endorses   Other:    Other medical symptoms:   Thyroid: Denies   Diabetes: Denies   Cardiovascular disease: Endorses    Past Psychiatric Hx: Alcohol misuse    Family Psychiatric Hx: None reported    Social Hx:   Social History     Socioeconomic History    Marital status: Single   Tobacco Use    Smoking status: Never    Smokeless tobacco: Never   Vaping Use    Vaping Use: Never used   Substance and Sexual Activity    Alcohol use: Yes     Alcohol/week: 21.0 oz     Types: 35 Glasses of wine per week     Comment: 1-2 drinks per day, 4-6 days a week    Drug use: No    Sexual activity: Not Currently   Social History Narrative    . 3 kids. One at home. Self employed     Social Determinants of Health     Transportation Needs: No Transportation Needs    Lack of Transportation (Medical): No    Lack of Transportation (Non-Medical): No      Medical Hx: Chart reviewed. Only those findings of potential interest to psychiatry are noted below:  Past Medical History:   Diagnosis Date    Alcohol use     Hypertension      Medical Conditions:     Allergies: Patient has no known allergies.  Medications (currently prescribed at [facility]):    Current Facility-Administered Medications:     hydrALAZINE (APRESOLINE) tablet 50 mg, 50 mg, Oral, Q8HRS, Vanessa Huizar M.D., 50 mg at 02/10/23 1515    baclofen (LIORESAL) tablet 5 mg, 5 mg, Oral, TID, Vanessa Huizar M.D., 5 mg at 02/10/23 1519    gabapentin (NEURONTIN) capsule 100 mg, 100 mg, Oral, TID, Vanessa  ROXANNA Huizar M.D., 100 mg at 02/10/23 1515    senna-docusate (PERICOLACE or SENOKOT S) 8.6-50 MG per tablet 2 Tablet, 2 Tablet, Oral, BID, 2 Tablet at 02/10/23 0903 **AND** polyethylene glycol/lytes (MIRALAX) PACKET 1 Packet, 1 Packet, Oral, DAILY, 1 Packet at 02/09/23 0856 **AND** magnesium hydroxide (MILK OF MAGNESIA) suspension 30 mL, 30 mL, Oral, QDAY PRN, 30 mL at 02/08/23 1326 **AND** bisacodyl (DULCOLAX) suppository 10 mg, 10 mg, Rectal, QDAY PRN, Vanessa Huizar M.D.    losartan (COZAAR) tablet 100 mg, 100 mg, Oral, Q DAY, Vanessa Huizar M.D., 100 mg at 02/10/23 0524    sodium chloride (SALT) tablet 2 g, 2 g, Oral, TID WITH MEALS, Vanessa Huizar M.D., 2 g at 02/10/23 1134    atorvastatin (LIPITOR) tablet 80 mg, 80 mg, Oral, Q EVENING, Vanessa Huizar M.D., 80 mg at 02/09/23 2043    hydrALAZINE (APRESOLINE) tablet 25 mg, 25 mg, Oral, Q8HRS PRN, Vanessa Huizar M.D.    hydrOXYzine HCl (ATARAX) tablet 50 mg, 50 mg, Oral, Q6HRS PRN, Vanessa Huizar M.D.    QUEtiapine (Seroquel) tablet 25 mg, 25 mg, Oral, TID PRN, Vanessa Huizar M.D.    melatonin tablet 3 mg, 3 mg, Oral, HS PRN, Vanessa Huizar M.D.    Respiratory Therapy Consult, , Nebulization, Continuous RT, Vanessa Huizar M.D.    acetaminophen (Tylenol) tablet 650 mg, 650 mg, Oral, Q4HRS PRN, Vanessa Huizar M.D.    lactulose 20 GM/30ML solution 30 mL, 30 mL, Oral, QDAY PRN, Vanessa Huizar M.D.    docusate sodium (ENEMEEZ) enema 283 mg, 283 mg, Rectal, QDAY PRN, Vanessa Huizar M.D.    mag hydrox-al hydrox-simeth (MAALOX PLUS ES or MYLANTA DS) suspension 20 mL, 20 mL, Oral, Q2HRS PRN, Vanessa Huizar M.D.    ondansetron (ZOFRAN ODT) dispertab 4 mg, 4 mg, Oral, 4X/DAY PRN **OR** ondansetron (ZOFRAN) syringe/vial injection 4 mg, 4 mg, Intramuscular, 4X/DAY PRN, Vanessa Huizar M.D.    traZODone (DESYREL) tablet 50 mg, 50 mg, Oral, QHS PRN, Vanessa Huizar M.D.    sodium chloride (OCEAN) 0.65 % nasal spray 2 Spray,  "2 Spray, Nasal, PRN, Vanessa Huizar M.D.    midazolam (VERSED) 5 mg/mL (1 mL vial), 5 mg, Nasal, PRN, Vanessa Huizar M.D.    amLODIPine (NORVASC) tablet 10 mg, 10 mg, Oral, Q DAY, Vanessa Huizar M.D., 10 mg at 02/10/23 0526    cloNIDine (CATAPRES) 0.1 MG/24HR patch 1 Patch, 1 Patch, Transdermal, Q7 DAYS, Vanessa Huizar M.D., 1 Patch at 02/10/23 1134    enoxaparin (Lovenox) inj 40 mg, 40 mg, Subcutaneous, DAILY AT 1800, Vanessa Huizar M.D., 40 mg at 02/09/23 1738  Labs:  No results found for this or any previous visit (from the past 24 hour(s)).       Cranial Imaging Hx: Head MRI 2/1/2023 IMPRESSION:        Acute-subacute hemorrhage in the left thalamus and adjacent parietal corona radiata with mass effect upon the posterior third ventricle and slight midline shift measuring 4 to 5 mm. Surrounding edema noted which extends to the cerebral peduncle on the   tectum.     No abnormal vascularity or abnormal enhancement is identified in the vicinity of the hemorrhage to suggest an underlying lesion.     Nonspecific T2 hyperintensities are noted in the periventricular and deep white matter, most likely related to chronic microvascular ischemia.  Other:    Psychiatric Examination:  Vitals: Blood pressure 134/85, pulse 74, temperature 36.7 °C (98 °F), temperature source Oral, resp. rate 18, height 1.676 m (5' 6\"), weight 80.2 kg (176 lb 14.4 oz), SpO2 96 %, not currently breastfeeding.  Musculoskeletal: Sitting in wheelchair normal psychomotor activity, no tics or unusual mannerisms noted  Appearance and Eye Contact: Easily established rapport WDWN, appropriate dress and grooming. Behavior is calm, cooperative,  appropriate eye contact  Attention/Alertness: Alert  Thought Process: Linear logical goal directed  Thought Content: No psychotic processes noted  Speech: Clear with normal rate and rhythm  Mood: \"A little overwhelmed\"            Affect: Euthymic somewhat tearful         SI/HI: Denies    Memory: " Recent and remote memory appear intact      Orientation: Alert and oriented to person place and time  Insight into symptoms: Within normal limits  Judgement into symptoms: Within normal limits    Neuropsychological Testing:   Not formally tested          ASSESSMENT: Anali Pan is a 57 y.o.  female with a past medical history of hypertension noncompliant on antihypertensive medications and alcohol use of approximately 5-6 drinks per day;  who presented on 1/31/2023 11:17 PM as a transfer from [facility] with right-sided weakness.  Per documentation, patient had acute onset right-sided weakness and difficulty speaking, she arrived at the outside hospital with SBP up to 200.  A CT head was obtained which showed an acute left thalamic hemorrhage patient was transferred to [facility] for higher level of care.  Patient was admitted to the ICU with strict SBP goal of 100-1 40.  She was placed on a nicardipine drip for blood pressure control.  An MRI brain was obtained with evidence of an acute/subacute hemorrhage in the left thalamus adjacent to the parietal corona radiata with mass effect upon the posterior third ventricle with slight midline shift measuring 4 to 5 mm there is no evidence of abnormality relating to underlying lesion or abnormal vascularity.  Patient's hospital course has been complicated by onset of alcohol withdrawal, patient had poor participation with therapy due to anxiety and unwillingness to complete full commands for therapy session.  Patient is on a Precedex drip for agitation.  Echocardiogram was obtained with an EF of 70%, grade 1 diastolic dysfunction with evidence right right to left shift/PFO    Psychology was consulted due to difficulty with adjustment to post stroke recovery.  Session focused on assessment of current functioning as well as psychoeducation regarding the common cognitive and emotional impacts of stroke and stroke recovery as well as cognitive  expectations for post stroke recovery.  We will continue to follow    DSM5 Diagnostic Considerations: Adjustment disorder with mixed anxiety and depressed mood      PLAN:  Records reviewed: Yes  Discussed patient with other provider: Gaye  We will continue to follow  Thank you for the consult.    Indy Hernandez, Ph.D.

## 2023-02-11 NOTE — THERAPY
Speech Language Pathology  Daily Treatment     Patient Name: Anali Pan  Age:  57 y.o., Sex:  female  Medical Record #: [id number]  Today's Date: 2/10/2023     Precautions  Precautions: Fall Risk  Comments: R hemiplegia, absent R UE/LE light touch sensation    Subjective    Pt pleasant cooperative during tx.       Objective       02/10/23 1531   Treatment Charges   SLP Cognitive Skill Development First 15 Minutes 1   SLP Cognitive Skill Development Additional 15 Minutes 1   SLP Total Time Spent   SLP Individual Total Time Spent (Mins) 30   Receptive Language / Auditory Comprehension   Follows Two Unit Commands Within Functional Limits (6-7)   Follows Three Unit Commands Within Functional Limits (6-7)   Expressive Language   Word Finding Deficits Minimal (4)   Cognition   Functional Memory Activities Minimal (4)         Assessment    Pt recalled daily events given min verbal cues to recall therapists' names.  Pt recalled meals, and therapy tasks, independently.  [place] category namin words per minute average.  2 step verbal directives: 100% independent.  3 step verbal directives: 100% independent.     Strengths: Able to follow instructions, Willingly participates in therapeutic activities, Motivated for self care and independence, Pleasant and cooperative, Independent prior level of function  Barriers: Impaired insight/denial of deficits, Aspiration risk    Plan    Complex attn/word finding.         Speech Therapy Problems (Active)       Problem: Comprehension STGs       Dates: Start: 23         Goal: STG-Within one week, patient will follow 2-step directions with 80% accuracy given min A       Dates: Start: 23               Problem: Expression STGs       Dates: Start: 23         Goal: STG-Within one week, patient will complete word-finding tasks (generative naming, categories) with 80% accuracy given min A       Dates: Start: 23               Problem: Memory STGs       Dates: Start:  02/07/23         Goal: STG-Within one week, patient will demonstrate delayed recall of new information with 80% accuracy with min A       Dates: Start: 02/07/23               Problem: Problem Solving STGs       Dates: Start: 02/07/23         Goal: STG-Within one week, patient will demonstrate sustained or alternating attention during structured tasks with 80% accuracy given min A        Dates: Start: 02/07/23               Problem: Speech/Swallowing LTGs       Dates: Start: 02/07/23         Goal: LTG-By discharge, patient will tolerate least restrictive diet with no s/s of aspiration        Dates: Start: 02/07/23            Goal: LTG-By discharge, patient will comprehend verbal and written information with 90% accuracy with mod I       Dates: Start: 02/07/23            Goal: LTG-By discharge, patient will express wants, needs, thoughts, and ideas with 90% accuracy with mod I       Dates: Start: 02/07/23            Goal: LTG-By discharge, patient will solve complex problems in structured and functional situations with 90% accuracy with spv        Dates: Start: 02/07/23               Problem: Swallowing STGs       Dates: Start: 02/07/23         Goal: STG-Within one week, patient will tolerated upgraded 7- Regular Textures  0-Thin liquids  diet with no s/s of aspiration        Dates: Start: 02/07/23

## 2023-02-12 PROCEDURE — 700102 HCHG RX REV CODE 250 W/ 637 OVERRIDE(OP): Performed by: PHYSICAL MEDICINE & REHABILITATION

## 2023-02-12 PROCEDURE — 97130 THER IVNTJ EA ADDL 15 MIN: CPT

## 2023-02-12 PROCEDURE — A9270 NON-COVERED ITEM OR SERVICE: HCPCS | Performed by: PHYSICAL MEDICINE & REHABILITATION

## 2023-02-12 PROCEDURE — 700111 HCHG RX REV CODE 636 W/ 250 OVERRIDE (IP): Performed by: PHYSICAL MEDICINE & REHABILITATION

## 2023-02-12 PROCEDURE — 97129 THER IVNTJ 1ST 15 MIN: CPT

## 2023-02-12 PROCEDURE — 770010 HCHG ROOM/CARE - REHAB SEMI PRIVAT*

## 2023-02-12 PROCEDURE — 92507 TX SP LANG VOICE COMM INDIV: CPT

## 2023-02-12 RX ADMIN — AMLODIPINE BESYLATE 10 MG: 5 TABLET ORAL at 05:04

## 2023-02-12 RX ADMIN — HYDRALAZINE HYDROCHLORIDE 50 MG: 25 TABLET, FILM COATED ORAL at 05:04

## 2023-02-12 RX ADMIN — LOSARTAN POTASSIUM 100 MG: 25 TABLET, FILM COATED ORAL at 05:03

## 2023-02-12 RX ADMIN — HYDRALAZINE HYDROCHLORIDE 50 MG: 25 TABLET, FILM COATED ORAL at 21:03

## 2023-02-12 RX ADMIN — HYDRALAZINE HYDROCHLORIDE 50 MG: 25 TABLET, FILM COATED ORAL at 14:06

## 2023-02-12 RX ADMIN — ENOXAPARIN SODIUM 40 MG: 40 INJECTION SUBCUTANEOUS at 17:51

## 2023-02-12 ASSESSMENT — FIBROSIS 4 INDEX: FIB4 SCORE: 0.74

## 2023-02-12 NOTE — CARE PLAN
Problem: Knowledge Deficit - Standard  Goal: Patient and family/care givers will demonstrate understanding of plan of care, disease process/condition, diagnostic tests and medications  Outcome: Progressing     Problem: Self Care  Goal: Patient will have the ability to perform ADLs independently or with assistance  Patient able to perform self care activities with minimal assist.  Outcome: Progressing   The patient is Stable - Low risk of patient condition declining or worsening

## 2023-02-12 NOTE — THERAPY
Speech Language Pathology  Daily Treatment     Patient Name: Anali Pan  Age:  57 y.o., Sex:  female  Medical Record #: [id number]  Today's Date: 2/11/2023     Precautions  Precautions: Fall Risk  Comments: R hemiplegia, absent R UE/LE light touch sensation    Subjective    Pt pleasant and cooperative during tx.  Dtr present and supportive during tx.     Objective       02/11/23 1301   Treatment Charges   SLP Treatment - Individual Speech Language Treatment - Individual   SLP Cognitive Skill Development First 15 Minutes 1   SLP Cognitive Skill Development Additional 15 Minutes 1   SLP Total Time Spent   SLP Individual Total Time Spent (Mins) 60   Expressive Language   Word Finding Deficits Minimal (4)   Cognition   Complex Reasoning  / Problem Solving Moderate (3)         Assessment    Complex word finding (pt required to name sports/games, hobbies, cities, outdoor objects for specified letters): 50% independent, 100% given mod verbal cues.  Deduction puzzle (3x4): 33% independent; 100% given mod verbal cues to attend to written details.      Strengths: Able to follow instructions, Willingly participates in therapeutic activities, Motivated for self care and independence, Pleasant and cooperative, Independent prior level of function  Barriers: Impaired insight/denial of deficits, Aspiration risk    Plan    Complex attn, word finding        Speech Therapy Problems (Active)       Problem: Comprehension STGs       Dates: Start: 02/07/23         Goal: STG-Within one week, patient will follow 2-step directions with 80% accuracy given min A       Dates: Start: 02/07/23               Problem: Expression STGs       Dates: Start: 02/07/23         Goal: STG-Within one week, patient will complete word-finding tasks (generative naming, categories) with 80% accuracy given min A       Dates: Start: 02/07/23               Problem: Memory STGs       Dates: Start: 02/07/23         Goal: STG-Within one week, patient will  demonstrate delayed recall of new information with 80% accuracy with min A       Dates: Start: 02/07/23               Problem: Problem Solving STGs       Dates: Start: 02/07/23         Goal: STG-Within one week, patient will demonstrate sustained or alternating attention during structured tasks with 80% accuracy given min A        Dates: Start: 02/07/23               Problem: Speech/Swallowing LTGs       Dates: Start: 02/07/23         Goal: LTG-By discharge, patient will tolerate least restrictive diet with no s/s of aspiration        Dates: Start: 02/07/23            Goal: LTG-By discharge, patient will comprehend verbal and written information with 90% accuracy with mod I       Dates: Start: 02/07/23            Goal: LTG-By discharge, patient will express wants, needs, thoughts, and ideas with 90% accuracy with mod I       Dates: Start: 02/07/23            Goal: LTG-By discharge, patient will solve complex problems in structured and functional situations with 90% accuracy with spv        Dates: Start: 02/07/23               Problem: Swallowing STGs       Dates: Start: 02/07/23         Goal: STG-Within one week, patient will tolerated upgraded 7- Regular Textures  0-Thin liquids  diet with no s/s of aspiration        Dates: Start: 02/07/23

## 2023-02-12 NOTE — CARE PLAN
"The patient is Stable - Low risk of patient condition declining or worsening         Progress made toward(s) clinical / shift goals:    Problem: Bowel Elimination  Goal: Patient will participate in bowel management program  Outcome: Progressing  Refused Senna, pt states she doesn't need them tonight, had good BM today per pt.     Problem: Skin Integrity  Goal: Patient's skin integrity will be maintained or improve  Outcome: Progressing     Problem: Infection  Goal: Patient will remain free from infection  Outcome: Progressing     Refused meds at bedtime except Hydralazine 50 mg PO. Patient states \" I'm going to talk to my MD in the morning, I don't need most of these medications. \"          "

## 2023-02-12 NOTE — CARE PLAN
Problem: Knowledge Deficit - Standard  Goal: Patient and family/care givers will demonstrate understanding of plan of care, disease process/condition, diagnostic tests and medications  Outcome: Progressing     Problem: Self Care  Goal: Patient will have the ability to perform ADLs independently or with assistance  Outcome: Progressing  Patient requires minimal assist with self care activities.   The patient is Stable - Low risk of patient condition declining or worsening

## 2023-02-13 LAB — SODIUM SERPL-SCNC: 136 MMOL/L (ref 135–145)

## 2023-02-13 PROCEDURE — A9270 NON-COVERED ITEM OR SERVICE: HCPCS | Performed by: PHYSICAL MEDICINE & REHABILITATION

## 2023-02-13 PROCEDURE — 97130 THER IVNTJ EA ADDL 15 MIN: CPT

## 2023-02-13 PROCEDURE — 770010 HCHG ROOM/CARE - REHAB SEMI PRIVAT*

## 2023-02-13 PROCEDURE — 97129 THER IVNTJ 1ST 15 MIN: CPT

## 2023-02-13 PROCEDURE — 99232 SBSQ HOSP IP/OBS MODERATE 35: CPT | Performed by: PHYSICAL MEDICINE & REHABILITATION

## 2023-02-13 PROCEDURE — 97112 NEUROMUSCULAR REEDUCATION: CPT

## 2023-02-13 PROCEDURE — 97530 THERAPEUTIC ACTIVITIES: CPT

## 2023-02-13 PROCEDURE — 97116 GAIT TRAINING THERAPY: CPT

## 2023-02-13 PROCEDURE — 700102 HCHG RX REV CODE 250 W/ 637 OVERRIDE(OP): Performed by: PHYSICAL MEDICINE & REHABILITATION

## 2023-02-13 PROCEDURE — 84295 ASSAY OF SERUM SODIUM: CPT

## 2023-02-13 PROCEDURE — 97535 SELF CARE MNGMENT TRAINING: CPT

## 2023-02-13 PROCEDURE — 700111 HCHG RX REV CODE 636 W/ 250 OVERRIDE (IP): Performed by: PHYSICAL MEDICINE & REHABILITATION

## 2023-02-13 PROCEDURE — 36415 COLL VENOUS BLD VENIPUNCTURE: CPT

## 2023-02-13 PROCEDURE — 92507 TX SP LANG VOICE COMM INDIV: CPT

## 2023-02-13 RX ORDER — BACLOFEN 10 MG/1
5 TABLET ORAL 3 TIMES DAILY
Status: DISCONTINUED | OUTPATIENT
Start: 2023-02-13 | End: 2023-02-15

## 2023-02-13 RX ORDER — HYDRALAZINE HYDROCHLORIDE 25 MG/1
75 TABLET, FILM COATED ORAL EVERY 8 HOURS
Status: DISCONTINUED | OUTPATIENT
Start: 2023-02-13 | End: 2023-02-24 | Stop reason: HOSPADM

## 2023-02-13 RX ORDER — SODIUM CHLORIDE 1 G/1
1 TABLET ORAL
Status: DISCONTINUED | OUTPATIENT
Start: 2023-02-13 | End: 2023-02-16

## 2023-02-13 RX ADMIN — LOSARTAN POTASSIUM 100 MG: 25 TABLET, FILM COATED ORAL at 05:40

## 2023-02-13 RX ADMIN — HYDRALAZINE HYDROCHLORIDE 75 MG: 25 TABLET, FILM COATED ORAL at 21:03

## 2023-02-13 RX ADMIN — AMLODIPINE BESYLATE 10 MG: 5 TABLET ORAL at 05:38

## 2023-02-13 RX ADMIN — ATORVASTATIN CALCIUM 80 MG: 40 TABLET, FILM COATED ORAL at 21:04

## 2023-02-13 RX ADMIN — SODIUM CHLORIDE 1 G: 1 TABLET ORAL at 12:10

## 2023-02-13 RX ADMIN — BACLOFEN 5 MG: 10 TABLET ORAL at 15:40

## 2023-02-13 RX ADMIN — ENOXAPARIN SODIUM 40 MG: 40 INJECTION SUBCUTANEOUS at 17:54

## 2023-02-13 RX ADMIN — SODIUM CHLORIDE 1 G: 1 TABLET ORAL at 17:54

## 2023-02-13 RX ADMIN — BACLOFEN 5 MG: 10 TABLET ORAL at 21:04

## 2023-02-13 RX ADMIN — HYDRALAZINE HYDROCHLORIDE 50 MG: 25 TABLET, FILM COATED ORAL at 05:40

## 2023-02-13 RX ADMIN — HYDRALAZINE HYDROCHLORIDE 75 MG: 25 TABLET, FILM COATED ORAL at 15:41

## 2023-02-13 ASSESSMENT — GAIT ASSESSMENTS
DISTANCE (FEET): 75
GAIT LEVEL OF ASSIST: MINIMAL ASSIST
DEVIATION: ATAXIC;DECREASED BASE OF SUPPORT
ASSISTIVE DEVICE: FRONT WHEEL WALKER

## 2023-02-13 ASSESSMENT — ACTIVITIES OF DAILY LIVING (ADL)
BED_CHAIR_WHEELCHAIR_TRANSFER_DESCRIPTION: SQUAT PIVOT TRANSFER TO WHEELCHAIR;SET-UP OF EQUIPMENT;SUPERVISION FOR SAFETY
BED_CHAIR_WHEELCHAIR_TRANSFER_DESCRIPTION: INCREASED TIME;SET-UP OF EQUIPMENT;SUPERVISION FOR SAFETY
TUB_SHOWER_TRANSFER_DESCRIPTION: GRAB BAR;SHOWER BENCH;INCREASED TIME;INITIAL PREPARATION FOR TASK;SET-UP OF EQUIPMENT;SUPERVISION FOR SAFETY

## 2023-02-13 NOTE — THERAPY
Physical Therapy   Daily Treatment     Patient Name: Anali Pan  Age:  57 y.o., Sex:  female  Medical Record #: [id number]  Today's Date: 2/13/2023     Precautions  Precautions: Fall Risk  Comments: R hemiplegia, absent R UE/LE light touch sensation    Subjective    Pt disappointed to not have aqua tx due to scheduling conflicts. Eager for any extra tx and reporting feeling bored. Has not been taking Baclofen but agreeable after education on its benefits for fxl mobility due to incr R UE/LE tone/spasticity     Objective       02/13/23 0845   PT Charge Group   PT Gait Training (Units) 2   PT Neuromuscular Re-Education / Balance (Units) 2   PT Total Time Spent   PT Individual Total Time Spent (Mins) 60   Gait Functional Level of Assist    Gait Level Of Assist Minimal Assist   Assistive Device Front Wheel Walker   Distance (Feet) 75   # of Times Distance was Traveled 2   Deviation Ataxic;Decreased Base Of Support  (R ace wrap for ankle eversion, R LE extensor synergy, facilitation for weight shift L, VC's for decr R step length and incr R step width)   Wheelchair Functional Level of Assist   Wheelchair Assist Supervised   Distance Wheelchair (Feet or Distance) 300   Wheelchair Description Supervision for safety   Transfer Functional Level of Assist   Bed, Chair, Wheelchair Transfer Contact Guard Assist   Bed Chair Wheelchair Transfer Description Squat pivot transfer to wheelchair;Set-up of equipment;Supervision for safety  (overuse of L LE)   Supine Lower Body Exercise   Other Exercises R ankle DF/eversion with knee extended, R DF/eversion in hooklying, Gross R LE flexion with emphasis on eccentric lowering x15 each; PROM R finger/wrist extension and R shoulder abduction   Sitting Lower Body Exercises   Ankle Pumps 1 set of 10;Bilateral  (alternating)   Long Arc Quad 1 set of 10;Right   Marching Reciprocal;1 set of 10   Other Exercises seated ankle DF/eversion   Bed Mobility    Supine to Sit Independent   Sit to  Supine Independent   Sit to Stand Minimal Assist  (R UE pushing and emphasis on R LE WBing with staggered stance from elevated mat)   Neuro-Muscular Treatments   Neuro-Muscular Treatments Weight Shift Right;Tapping;Verbal Cuing;Facilitation;Anterior weight shift   Comments sit to stands with emphasis on incr use of R LE and pushing with R UE   Interdisciplinary Plan of Care Collaboration   IDT Collaboration with  Physical Therapist Assistant (PTA)   Patient Position at End of Therapy Seated;Chair Alarm On;Self Releasing Lap Belt Applied   Collaboration Comments scheduling for aqua tx     Completed gait training initially with mirror feedback then progressed away from WC follow and incorporated turns and chair approaches.  Educated pt in supine and seated HEP for R LE coordination and strength, see above. Provided written list.  Educated pt in the benefits of baclofen as she reports refusing meds due to high amount she has been taking while admitted     02/13/23 1335   PT Charge Group   PT Therapeutic Activities (Units) 1   PT Total Time Spent   PT Individual Total Time Spent (Mins) 15     Assisted pt with toilet transfer CGA and R HHA/[place] for dynamic standing balance to doff pants. Pt required depA to pull up pants. Completed CGA wc>bed transfer and independent sit>supine for pt to perform supine HEP. Educated pt again on the benefits of baclofen and pt was agreeable to trial this medication. Provided written description of the purpose of baclofen and gabapentin as pt had mixed these medications up.    Assessment    Pt made significant progress in her ambulation distance to 75ft with FWW and assist for steering/R UE grasp. Pt demonstrated increased R ankle inversion during swing phase and initial contact that improved with R ace wrap for DF/eversion. Pt has also demonstrated  mild increase in impulsivity with decreased insight into her impairments as her independence with fxl mobility has progressed.  Strengths:  Able to follow instructions, Alert and oriented, Effective communication skills, Good carryover of learning, Good endurance, Independent prior level of function  Barriers: Generalized weakness, Hemiparesis, Impaired balance    Plan    Trial building up handle of FWW for improved grasp, gait FWW vs QC with R ace wrap for DF/eversion, NRE for R coordination and forced use    Passport items to be completed:  Get in/out of bed safely, in/out of a vehicle, safely use mobility device, walk or wheel around home/community, navigate up and down stairs, show how to get up/down from the ground, ensure home is accessible, demonstrate HEP, complete caregiver training    Physical Therapy Problems (Active)       Problem: Mobility       Dates: Start: 02/07/23         Goal: STG-Within one week, patient will propel wheelchair 100ft with SPV       Dates: Start: 02/07/23            Goal: STG-Within one week, patient will ambulate 15ft with LRAD with modA and WC follow       Dates: Start: 02/07/23               Problem: Mobility Transfers       Dates: Start: 02/07/23         Goal: STG-Within one week, patient will perform bed mobility independently       Dates: Start: 02/07/23            Goal: STG-Within one week, patient will transfer bed to chair with CGA       Dates: Start: 02/07/23               Problem: PT-Long Term Goals       Dates: Start: 02/07/23         Goal: LTG-By discharge, patient will propel wheelchair 150ft [place]       Dates: Start: 02/07/23            Goal: LTG-By discharge, patient will ambulate 150ft with LRAD and CGA       Dates: Start: 02/07/23            Goal: LTG-By discharge, patient will transfer one surface to another [place]       Dates: Start: 02/07/23            Goal: LTG-By discharge, patient will perform home exercise program independently       Dates: Start: 02/07/23            Goal: LTG-By discharge, patient will ambulate up/down 1 curb step with [place]       Dates: Start: 02/07/23

## 2023-02-13 NOTE — THERAPY
Speech Language Pathology  Daily Treatment     Patient Name: Anali Pan  Age:  57 y.o., Sex:  female  Medical Record #: [id number]  Today's Date: 2/12/2023     Precautions  Precautions: Fall Risk  Comments: R hemiplegia, absent R UE/LE light touch sensation    Subjective    Pt pleasant and cooperative during tx.       Objective       02/12/23 1431   Treatment Charges   SLP Treatment - Individual Speech Language Treatment - Individual   SLP Cognitive Skill Development First 15 Minutes 1   SLP Cognitive Skill Development Additional 15 Minutes 2   SLP Total Time Spent   SLP Individual Total Time Spent (Mins) 60   Expressive Language   Word Finding Deficits Supervision (5)   Cognition   Moderate Attention Minimal (4)   Complex Reasoning  / Problem Solving Moderate (3)         Assessment    Pt completed 3x5 deduction puzzle with 50% accuracy independently; 100% given min-mod verbal cues to attend to written details.  SLP acted as scribe.  Pt was introduced to a 6-rule card game.  Pt demonstrated ability to participate in the game given min verbal cues to recall the rules.  Name item given concrete category and first letter: 70% independent; 100% given min verbal cues.      Strengths: Able to follow instructions, Willingly participates in therapeutic activities, Motivated for self care and independence, Pleasant and cooperative, Independent prior level of function  Barriers: Impaired insight/denial of deficits, Aspiration risk    Plan    Target attn, complex reasoning/executive function, complex word finding.         Speech Therapy Problems (Active)       Problem: Comprehension STGs       Dates: Start: 02/07/23         Goal: STG-Within one week, patient will follow 2-step directions with 80% accuracy given min A       Dates: Start: 02/07/23               Problem: Expression STGs       Dates: Start: 02/07/23         Goal: STG-Within one week, patient will complete word-finding tasks (generative naming, categories) with  80% accuracy given min A       Dates: Start: 02/07/23               Problem: Memory STGs       Dates: Start: 02/07/23         Goal: STG-Within one week, patient will demonstrate delayed recall of new information with 80% accuracy with min A       Dates: Start: 02/07/23               Problem: Problem Solving STGs       Dates: Start: 02/07/23         Goal: STG-Within one week, patient will demonstrate sustained or alternating attention during structured tasks with 80% accuracy given min A        Dates: Start: 02/07/23               Problem: Speech/Swallowing LTGs       Dates: Start: 02/07/23         Goal: LTG-By discharge, patient will tolerate least restrictive diet with no s/s of aspiration        Dates: Start: 02/07/23            Goal: LTG-By discharge, patient will comprehend verbal and written information with 90% accuracy with mod I       Dates: Start: 02/07/23            Goal: LTG-By discharge, patient will express wants, needs, thoughts, and ideas with 90% accuracy with mod I       Dates: Start: 02/07/23            Goal: LTG-By discharge, patient will solve complex problems in structured and functional situations with 90% accuracy with spv        Dates: Start: 02/07/23               Problem: Swallowing STGs       Dates: Start: 02/07/23         Goal: STG-Within one week, patient will tolerated upgraded 7- Regular Textures  0-Thin liquids  diet with no s/s of aspiration        Dates: Start: 02/07/23

## 2023-02-13 NOTE — THERAPY
"Occupational Therapy  Daily Treatment     Patient Name: Anali Pan  Age:  57 y.o., Sex:  female  Medical Record #: [id number]  Today's Date: 2/13/2023     Precautions  Precautions: Fall Risk  Comments: R hemiplegia, absent R UE/LE light touch sensation         Subjective    \"I'm down for anything, lets go!\"      Objective       02/13/23 0701   OT Charge Group   OT Self Care / ADL (Units) 3   OT Neuromuscular Re-education / Balance (Units) 1   OT Total Time Spent   OT Individual Total Time Spent (Mins) 60   Cognition    Level of Consciousness Alert   Functional Level of Assist   Eating Supervision   Eating Description Increased time;Set-up of equipment or meal/tube feeding   Grooming Supervision   Grooming Description Increased time;Seated in wheelchair at sink;Set-up of equipment  (brush hair)   Bathing Minimal Assist   Bathing Description Grab bar;Hand held shower;Assit with perineal;Increased time;Initial preparation for task;Set-up of equipment;Supervision for safety   Upper Body Dressing Stand by Assist   Upper Body Dressing Description Increased time;Initial preparation for task;Set-up of equipment  (doff/don long sleeve pull over shirt)   Lower Body Dressing Moderate Assist   Lower Body Dressing Description Increased time;Initial preparation for task;Set-up of equipment;Supervision for safety  (assist to doff/don pants/brief around waist; pt able to thread/doff pants over feet; able to doff/don L sock and shoe; required assist to don R shoe and lace L shoe)   Bed, Chair, Wheelchair Transfer Contact Guard Assist   Bed Chair Wheelchair Transfer Description Increased time;Set-up of equipment;Supervision for safety  (stand pivot)   Tub / Shower Transfers Contact Guard Assist   Tub Shower Transfer Description Grab bar;Shower bench;Increased time;Initial preparation for task;Set-up of equipment;Supervision for safety   Neuro-Muscular Treatments   Neuro-Muscular Treatments Facilitation;Postural Facilitation;Weight " Shift Right;Weight Shift Left   Comments Pt engaged in weight shifting activity in // bars to work on standing balance and WB through RUE/RLE in order to improve balance for LBD in standing   Bed Mobility    Supine to Sit Independent   Scooting Supervised   Rolling Supervised   Interdisciplinary Plan of Care Collaboration   Patient Position at End of Therapy Seated;Self Releasing Lap Belt Applied;Call Light within Reach;Tray Table within Reach;Phone within Reach         Assessment    Pt tolerated session well. Pt completed shower this morning in prep for pool therapy later today. Pt demonstrated improvements in UBD, LBD and showering this morning and only required SBA-mod a with these tasks vs mod-max last session. Pt engaged in standing balance in // bars to work on weight shifting to R side to work on standing balance to improve independence with LBD- pt unable to take L hand off bar during standing in bathroom to doff/don pants around waist- pt demonstrated removing L hand from // bars in standing with CGA and simulated dressing tasks with therapist blocking R knee.      Strengths: Able to follow instructions, Alert and oriented, Effective communication skills, Good insight into deficits/needs, Independent prior level of function, Making steady progress towards goals, Manages pain appropriately, Motivated for self care and independence, Pleasant and cooperative, Supportive family, Willingly participates in therapeutic activities  Barriers: Decreased endurance, Impaired activity tolerance, Impaired balance, Limited mobility, Hemiparesis    Plan    RUE Neuro re-ed, Vision (double vision/tracking/acuity), functional cognition, ADLs, activity tolerance. monitor BP for hypertension. Pt is not always symptomatic.   3x/wk w/ tech     Passport items to be completed:  Perform bathroom transfers, complete dressing, complete feeding, get ready for the day, prepare a simple meal, participate in household tasks, adapt  home for safety needs, demonstrate home exercise program, complete caregiver training     Occupational Therapy Goals (Active)       Problem: Bathing       Dates: Start: 02/07/23         Goal: STG-Within one week, patient will bathe w/ min A.        Dates: Start: 02/07/23               Problem: Dressing       Dates: Start: 02/07/23         Goal: STG-Within one week, patient will dress UB w/ supervision.        Dates: Start: 02/07/23            Goal: STG-Within one week, patient will dress LB w/ min A.        Dates: Start: 02/07/23               Problem: Functional Transfers       Dates: Start: 02/07/23         Goal: STG-Within one week, patient will transfer to toilet w/ CGA.       Dates: Start: 02/07/23            Goal: STG-Within one week, patient will transfer to step in shower w/ CGA.       Dates: Start: 02/07/23               Problem: OT Long Term Goals       Dates: Start: 02/07/23         Goal: LTG-By discharge, patient will complete basic self care tasks w/ mod I - min A.       Dates: Start: 02/07/23            Goal: LTG-By discharge, patient will perform bathroom transfers w/ mod I - min A.       Dates: Start: 02/07/23               Problem: Toileting       Dates: Start: 02/07/23         Goal: STG-Within one week, patient will complete toileting tasks w/ min.       Dates: Start: 02/07/23

## 2023-02-13 NOTE — PROGRESS NOTES
"Rehab Progress Note     Date of Service: 2/13/2023  Chief Complaint: follow up stroke    Interval Events (Subjective)    Patient seen and examined today in her room.  She had a lot of pain in her right arm when trying to do the  yesterday.  Over the weekend she decided she did not want to take many of her medications including statin, gabapentin, and her baclofen.  She was willing to take her blood pressure medications however.  After further discussion with the patient she is willing to restart the gabapentin for nerve pain and baclofen for her spasticity which is interfering with her mobility per therapy.  Patient does not think she needs speech therapy but is willing to participate.     Patient has no other new questions, concerns, or complaints today.     ROS: No changes to bowel, bladder, pain, mood, or sleep.       Objective:  VITAL SIGNS: /85   Pulse 82   Temp 37.2 °C (98.9 °F) (Oral)   Resp 20   Ht 1.676 m (5' 6\")   Wt 79.4 kg (175 lb)   LMP  (LMP Unknown)   SpO2 94%   BMI 28.25 kg/m²   Gen: alert, no apparent distress  CV: regular rate, regular rhythm, no murmurs, no peripheral edema  Resp: clear to ascultation bilaterally, normal respiratory effort  GI: soft, non-tender abdomen, bowel sounds present  Neuro: notable for right spastic hemiparesis, hemianesthesia, right-sided hyperreflexia        Recent Results (from the past 72 hour(s))   SODIUM SERUM (NA)    Collection Time: 02/13/23  5:27 AM   Result Value Ref Range    Sodium 136 135 - 145 mmol/L       Scheduled Medications   Medication Dose Frequency    hydrALAZINE  75 mg Q8HRS    sodium chloride  1 g TID WITH MEALS    baclofen  5 mg TID    gabapentin  100 mg TID    senna-docusate  2 Tablet BID    And    polyethylene glycol/lytes  1 Packet DAILY    losartan  100 mg Q DAY    atorvastatin  80 mg Q EVENING    amLODIPine  10 mg Q DAY    cloNIDine  1 Patch Q7 DAYS    enoxaparin (LOVENOX) injection  40 mg DAILY AT 1800       Current Diet " Order   Procedures    Diet Order Diet: Regular (Meds one at a time with liquid wash, larger pills in applesauce); Fluid modifications: (optional): 1500 ml Fluid Restriction       Assessment:    This patient is a 57 y.o. female admitted for acute inpatient rehabilitation with Hemorrhagic stroke (HCC).    I led and attended the weekly conference, and agree with the IDT conference documentation and plan of care as noted below.    Date of conference: 2/8/2023    Goals and barriers: See IDT note.    Biggest barriers: Right hemiparesis, impulsive    CM/social support: Family to provide 24/7    Anticipated DC date: 3/7    Home health: PT/OT/SLP/RN    Equip: To be determined    Follow up: PCP, [facility], Dr. De La Torre      Problem List/Medical Decision Making and Plan:    Left basal hemorrhagic ganglia stroke  From uncontrolled hypertension  Right hemiparesis, continues  Right sarahy-anesthesia, continues  Cognitive impairment  Dysphagia, resolved  Cognitive impairment, poor insight, continues  Impulsive, continues  Mild expressive aphasia/anomia  Visual impairment  Double vision, improved  Right neglect  Continue full rehab program  PT/OT/SLP, 1 hr each discipline, 5 days per week  [facility] therapy  Recreational therapy    Outpatient follow up with stroke bridge clinic, Dr. De La Torre/PMR, referrals made    Neuropathic pain, improved  In the right arm  Started low-dose gabapentin 100 mg 3 times daily, patient refused over the weekend, she is now willing to restart    Spasticity  Started low-dose baclofen 5 mg 3 times daily, patient refused over the weekend, she is now willing to start    Hyperlipidemia    Statin, increase dose to 80 g  Goal <70  Patient refusing medication    Hypertension, improved  Clonidine, will attempt to titrate off, not good for stroke recovery  Amlodipine 10 mg, max dose  Losartan, dose increased 50 --> 75 mg --> 100 mg  Added scheduled hydralazine 10 mg TID --> 25 mg TID --> 50 mg TID --> 75 mg  TID  Titrate off salt tabs soon as possible, decreasing today  Once stable on amlodipine, losartan, and hydralazine, will change clonidine to metoprolol     Alcohol use  Will need counseling  Neuro-psychologist consulted     PFO  Not cause of stroke  Outpatient follow up     Hyponatremia, resolved  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  Physical Medicine and Rehabilitation

## 2023-02-13 NOTE — CARE PLAN
"  Problem: Fall Risk - Rehab  Goal: Patient will remain free from falls  Outcome: Progressing     Karime Olivarez Fall risk Assessment Score: 12    Moderate fall risk Interventions  - Bed and strip alarm   - Yellow sign by the door   - Yellow wrist band \"Fall risk\"  - Room near to the nurse station  - Do not leave patient unattended in the bathroom  - Fall risk education provided              Problem: Pain - Standard  Goal: Alleviation of pain or a reduction in pain to the patient’s comfort goal  Outcome: Progressing   Patient able to rate pain on a scale of 1-10.    "

## 2023-02-13 NOTE — THERAPY
Speech Language Pathology  Daily Treatment     Patient Name: Anali aPn  Age:  57 y.o., Sex:  female  Medical Record #: [id number]  Today's Date: 2/13/2023     Precautions  Precautions: Fall Risk  Comments: R hemiplegia, absent R UE/LE light touch sensation    Subjective    Pt found in bed. Agreeable to ST services today.     Objective       02/13/23 1331   Expressive Language   Word Finding Deficits Supervision (5)   Cognition   Complex Reasoning  / Problem Solving Minimal (4)   Medication Management  Moderate (3)       Assessment    Patient completed logical scheduling activity with 100% accuracy with one self correction.    Pt completed word-finding table to list hobbies, items found outside, cities/towns, and games/sports for letters A-J with 100% accuracy given min. logical cues. Pt demonstrates challenges with complex word finding, however, does not demonstrate word finding difficulties is conversational speech.    Medication management introduced today. Pt required to find errors in a weekly pillbox; completed with 100% accuracy with min verbal cues. Pt's medication list was created and pt agreeable to med. Management acivities in future ST sessions.     Strengths: Able to follow instructions, Willingly participates in therapeutic activities, Motivated for self care and independence, Pleasant and cooperative, Independent prior level of function  Barriers: Impaired insight/denial of deficits, Aspiration risk    Plan    Med. Management, attn., problem solving, complex word finding, safety training      Speech Therapy Problems (Active)       Problem: Comprehension STGs       Dates: Start: 02/07/23         Goal: STG-Within one week, patient will follow 2-step directions with 80% accuracy given min A       Dates: Start: 02/07/23               Problem: Expression STGs       Dates: Start: 02/07/23         Goal: STG-Within one week, patient will complete word-finding tasks (generative naming, categories) with 80%  accuracy given min A       Dates: Start: 02/07/23               Problem: Memory STGs       Dates: Start: 02/07/23         Goal: STG-Within one week, patient will demonstrate delayed recall of new information with 80% accuracy with min A       Dates: Start: 02/07/23               Problem: Problem Solving STGs       Dates: Start: 02/07/23         Goal: STG-Within one week, patient will demonstrate sustained or alternating attention during structured tasks with 80% accuracy given min A        Dates: Start: 02/07/23               Problem: Speech/Swallowing LTGs       Dates: Start: 02/07/23         Goal: LTG-By discharge, patient will tolerate least restrictive diet with no s/s of aspiration        Dates: Start: 02/07/23            Goal: LTG-By discharge, patient will comprehend verbal and written information with 90% accuracy with mod I       Dates: Start: 02/07/23            Goal: LTG-By discharge, patient will express wants, needs, thoughts, and ideas with 90% accuracy with mod I       Dates: Start: 02/07/23            Goal: LTG-By discharge, patient will solve complex problems in structured and functional situations with 90% accuracy with spv        Dates: Start: 02/07/23               Problem: Swallowing STGs       Dates: Start: 02/07/23         Goal: STG-Within one week, patient will tolerated upgraded 7- Regular Textures  0-Thin liquids  diet with no s/s of aspiration        Dates: Start: 02/07/23

## 2023-02-14 PROCEDURE — 97130 THER IVNTJ EA ADDL 15 MIN: CPT

## 2023-02-14 PROCEDURE — 770010 HCHG ROOM/CARE - REHAB SEMI PRIVAT*

## 2023-02-14 PROCEDURE — 97112 NEUROMUSCULAR REEDUCATION: CPT | Mod: CQ

## 2023-02-14 PROCEDURE — 97112 NEUROMUSCULAR REEDUCATION: CPT

## 2023-02-14 PROCEDURE — A9270 NON-COVERED ITEM OR SERVICE: HCPCS | Performed by: PHYSICAL MEDICINE & REHABILITATION

## 2023-02-14 PROCEDURE — 97129 THER IVNTJ 1ST 15 MIN: CPT

## 2023-02-14 PROCEDURE — 700102 HCHG RX REV CODE 250 W/ 637 OVERRIDE(OP): Performed by: PHYSICAL MEDICINE & REHABILITATION

## 2023-02-14 PROCEDURE — 97110 THERAPEUTIC EXERCISES: CPT | Mod: CQ

## 2023-02-14 PROCEDURE — 90832 PSYTX W PT 30 MINUTES: CPT | Performed by: PSYCHOLOGIST

## 2023-02-14 PROCEDURE — 700111 HCHG RX REV CODE 636 W/ 250 OVERRIDE (IP): Performed by: PHYSICAL MEDICINE & REHABILITATION

## 2023-02-14 PROCEDURE — 97116 GAIT TRAINING THERAPY: CPT | Mod: CQ

## 2023-02-14 PROCEDURE — 97535 SELF CARE MNGMENT TRAINING: CPT

## 2023-02-14 PROCEDURE — 99232 SBSQ HOSP IP/OBS MODERATE 35: CPT | Performed by: PHYSICAL MEDICINE & REHABILITATION

## 2023-02-14 RX ADMIN — HYDRALAZINE HYDROCHLORIDE 75 MG: 25 TABLET, FILM COATED ORAL at 15:29

## 2023-02-14 RX ADMIN — AMLODIPINE BESYLATE 10 MG: 5 TABLET ORAL at 05:51

## 2023-02-14 RX ADMIN — LOSARTAN POTASSIUM 100 MG: 25 TABLET, FILM COATED ORAL at 05:50

## 2023-02-14 RX ADMIN — HYDRALAZINE HYDROCHLORIDE 75 MG: 25 TABLET, FILM COATED ORAL at 21:31

## 2023-02-14 RX ADMIN — ATORVASTATIN CALCIUM 80 MG: 40 TABLET, FILM COATED ORAL at 21:30

## 2023-02-14 RX ADMIN — SODIUM CHLORIDE 1 G: 1 TABLET ORAL at 08:22

## 2023-02-14 RX ADMIN — SODIUM CHLORIDE 1 G: 1 TABLET ORAL at 12:16

## 2023-02-14 RX ADMIN — ENOXAPARIN SODIUM 40 MG: 40 INJECTION SUBCUTANEOUS at 17:46

## 2023-02-14 RX ADMIN — BACLOFEN 5 MG: 10 TABLET ORAL at 15:29

## 2023-02-14 RX ADMIN — BACLOFEN 5 MG: 10 TABLET ORAL at 08:22

## 2023-02-14 RX ADMIN — BACLOFEN 5 MG: 10 TABLET ORAL at 21:31

## 2023-02-14 RX ADMIN — HYDRALAZINE HYDROCHLORIDE 75 MG: 25 TABLET, FILM COATED ORAL at 05:52

## 2023-02-14 RX ADMIN — SODIUM CHLORIDE 1 G: 1 TABLET ORAL at 17:46

## 2023-02-14 ASSESSMENT — ACTIVITIES OF DAILY LIVING (ADL)
BED_CHAIR_WHEELCHAIR_TRANSFER_DESCRIPTION: ADAPTIVE EQUIPMENT;INCREASED TIME;SUPERVISION FOR SAFETY;VERBAL CUEING
TOILETING_LEVEL_OF_ASSIST_DESCRIPTION: GRAB BAR;INCREASED TIME;SUPERVISION FOR SAFETY
TOILET_TRANSFER_DESCRIPTION: GRAB BAR;INCREASED TIME;SUPERVISION FOR SAFETY

## 2023-02-14 ASSESSMENT — GAIT ASSESSMENTS
ASSISTIVE DEVICE: HEMI-WALKER
DISTANCE (FEET): 90
DEVIATION: ATAXIC;DECREASED BASE OF SUPPORT
GAIT LEVEL OF ASSIST: MINIMAL ASSIST

## 2023-02-14 ASSESSMENT — PATIENT HEALTH QUESTIONNAIRE - PHQ9
SUM OF ALL RESPONSES TO PHQ9 QUESTIONS 1 AND 2: 0
2. FEELING DOWN, DEPRESSED, IRRITABLE, OR HOPELESS: NOT AT ALL
1. LITTLE INTEREST OR PLEASURE IN DOING THINGS: NOT AT ALL

## 2023-02-14 NOTE — THERAPY
"Recreational Therapy  Daily Treatment     Patient Name: Anali Pan  AGE:  57 y.o., SEX:  female  Medical Record #: [id number]  Today's Date: 2/13/2023       Subjective    Pt was willing and ready for recreation therapy session.      Objective       02/13/23 1600   Procedural Tracking   Procedural Tracking Leisure Skills Awareness;Leisure Skills Development;Fine Motor Functional Leisure Skills;Cognitive Skills Training   Treatment Time   Total Time Spent (mins) 30   Total Time Missed 0   Functional Ability Status - Cognitive   Attention Span Remains on Task with Cueing   Comprehension Follows Two Step Commands   Judgment Needs Assistance;Able to Make Independent Decisions   Functional Ability Status - Emotional    Affect Appropriate   Mood Appropriate   Behavior Appropriate;Cooperative   Leisure Competence Measure   Leisure Awareness Minimal Assist   Leisure Attitude Minimal Assist   Leisure Skills Minimal Assist   Skilled Intervention    Skilled Intervention Fine Motor Leisure;Leisure Education ;Cognitive Leisure   Interdisciplinary Plan of Care Collaboration   Patient Position at End of Therapy Seated;Call Light within Reach;Tray Table within Reach;Phone within Reach   Strengths & Barriers   Strengths Able to follow instructions;Alert and oriented;Good carryover of learning;Independent prior level of function;Making steady progress towards goals;Willingly participates in therapeutic activities;Pleasant and cooperative   Barriers Decreased endurance;Impaired carryover of learning;Pain     Patient participated in new game, [place] [employer], during rec therapy session. Patient required min A with rolling dice, using a container. Patient required min cues for rule recall, addition of points, and attention. Patient benefited from using a rules \"cheat sheet\". Patient requested session be done outside, but discovered it was too cold/windy for her liking. Session was completed in therapy gym.      Assessment    Patient is 57 y.o. " female with a past medical history of hypertension noncompliant on antihypertensive medications and a diagnosis of Stroke: Right Body Involvement (Left Brain).      Strengths: Able to follow instructions, Alert and oriented, Good carryover of learning, Independent prior level of function, Making steady progress towards goals, Willingly participates in therapeutic activities, Pleasant and cooperative  Barriers: Decreased endurance, Impaired carryover of learning, Pain    Plan    Patient will benefit from continued recreation therapy during her stay, 2-3 times per week. Patient is making steady progress towards goals.     Passport items to be completed:  Verbalize two positive leisure activities, discuss returning to work, hobbies, community groups or volunteer activities, explore community resources

## 2023-02-14 NOTE — THERAPY
Physical Therapy   Daily Treatment     Patient Name: Anali Pan  Age:  57 y.o., Sex:  female  Medical Record #: [id number]  Today's Date: 2/14/2023     Precautions  Precautions: Fall Risk  Comments: R hemiplegia, absent R UE/LE light touch sensation    Subjective    Received pt from therapy tech, agreeable to session.     Objective       02/14/23 1001   PT Charge Group   PT Gait Training (Units) 2   PT Therapeutic Exercise (Units) 1   PT Neuromuscular Re-Education / Balance (Units) 1   Supervising Physical Therapist Mayra [place]   PT Total Time Spent   PT Individual Total Time Spent (Mins) 60   Pain   Intervention Declines   Pain 0 - 10 Group   Pain Rating Scale (NPRS) 0   Cognition    Level of Consciousness Alert   Gait Functional Level of Assist    Gait Level Of Assist Minimal Assist   Assistive Device Elpidio-Walker   Distance (Feet) 90   # of Times Distance was Traveled 1   Deviation Ataxic;Decreased Base Of Support  (R ace wrap for ankle DF, EV)   Wheelchair Functional Level of Assist   Wheelchair Assist Modified Independent   Distance Wheelchair (Feet or Distance) 150   Wheelchair Description   (elpidio technique)   Transfer Functional Level of Assist   Bed, Chair, Wheelchair Transfer Minimal Assist   Bed Chair Wheelchair Transfer Description Adaptive equipment;Increased time;Supervision for safety;Verbal cueing  (hemiwalker)   Sitting Lower Body Exercises   Sit to Stand 1 set of 10  (forced use RLE while RLE on 2 inches step)   Bed Mobility    Sit to Stand Contact Guard Assist  (in // bars, [place] w/ hemiwalker)   Neuro-Muscular Treatments   Neuro-Muscular Treatments Facilitation;Anterior weight shift  (forward tapping w/ RLE, cues for foot placement. forward tapping w/ LLE w/ therapist blocking R knee and cues for R quad activation. 10 each w/ CGA.)   Interdisciplinary Plan of Care Collaboration   IDT Collaboration with  Physician   Patient Position at End of Therapy Seated;Call Light within Reach;Tray Table within  Reach;Phone within Reach   Collaboration Comments MD dahl     Ambulation w/ FWW w/ R adaptive  device on FWW 50 ft w/ R DF/EV ace wrapped w/ [place]    Assessment    Pt tolerated session well, trial hemiwalker for ambulation and pt required less assistance and demonstrated more steady step length. During FWW ambulation pt's RLE would always get caught in walker. Will be benefit from further assessment. Pt's eager to improve, pleasant and cooperative.    Strengths: Able to follow instructions, Alert and oriented, Effective communication skills, Good carryover of learning, Good endurance, Independent prior level of function  Barriers: Generalized weakness, Hemiparesis, Impaired balance    Plan    Trial building up handle of FWW for improved grasp, gait FWW vs QC with R ace wrap for DF/eversion, NRE for R coordination and forced use     Passport items to be completed:  Get in/out of bed safely, in/out of a vehicle, safely use mobility device, walk or wheel around home/community, navigate up and down stairs, show how to get up/down from the ground, ensure home is accessible, demonstrate HEP, complete caregiver training       Physical Therapy Problems (Active)       Problem: Mobility       Dates: Start: 02/07/23         Goal: STG-Within one week, patient will propel wheelchair 100ft with SPV       Dates: Start: 02/07/23            Goal: STG-Within one week, patient will ambulate 15ft with LRAD with modA and WC follow       Dates: Start: 02/07/23               Problem: Mobility Transfers       Dates: Start: 02/07/23         Goal: STG-Within one week, patient will perform bed mobility independently       Dates: Start: 02/07/23            Goal: STG-Within one week, patient will transfer bed to chair with CGA       Dates: Start: 02/07/23               Problem: PT-Long Term Goals       Dates: Start: 02/07/23         Goal: LTG-By discharge, patient will propel wheelchair 150ft [place]       Dates: Start: 02/07/23             Goal: LTG-By discharge, patient will ambulate 150ft with LRAD and CGA       Dates: Start: 02/07/23            Goal: LTG-By discharge, patient will transfer one surface to another [place]       Dates: Start: 02/07/23            Goal: LTG-By discharge, patient will perform home exercise program independently       Dates: Start: 02/07/23            Goal: LTG-By discharge, patient will ambulate up/down 1 curb step with [place]       Dates: Start: 02/07/23

## 2023-02-14 NOTE — CARE PLAN
Problem: Self Care  Goal: Patient will have the ability to perform ADLs independently or with assistance  Outcome: Progressing  Note: Patient able to perform regular activities this shift.  Pain controlled this shift.  Pain management includes PRN pain meds as well as non-pharmacological measures such as emotional support, rest, and repositioning.  Will continue to monitor.    The patient is Stable - Low risk of patient condition declining or worsening    Shift Goals  Clinical Goals: Safety  Patient Goals: saftey    Progress made toward(s) clinical / shift goals:  pt participates with therapy and is cooperative with nursing    Patient is not progressing towards the following goals:

## 2023-02-14 NOTE — PROGRESS NOTES
"Rehab Progress Note     Date of Service: 2/14/2023  Chief Complaint: follow up stroke    Interval Events (Subjective)    Patient seen and examined today in the therapy gym.  She is doing some ambulation with physical therapy.  She does better with a hemiwalker as compared to the front wheeled walker.  Patient reports no pain today in her right arm.  She was willing to restart the baclofen for spasticity but does not want to take the gabapentin.  We discussed her high cholesterol with an LDL of 139 and she is agreeable to continue the statin.  She is looking forward to doing pool therapy soon as possible.  She moved her bowels yesterday.    Patient has no other new questions, concerns, or complaints today.       ROS: No changes to bowel, bladder, pain, mood, or sleep.         Objective:  VITAL SIGNS: /83   Pulse 69   Temp 36.6 °C (97.8 °F) (Oral)   Resp 20   Ht 1.676 m (5' 6\")   Wt 79.4 kg (175 lb)   LMP  (LMP Unknown)   SpO2 97%   BMI 28.25 kg/m²   Gen: alert, no apparent distress  CV: Regular rate, regular rhythm  Resp: Clear to auscultation bilaterally  Neuro: Right spastic hemiparesis, ambulates with assistance from the therapist as well as an assistive device with notable ataxia and incoordination of the right arm and leg        Recent Results (from the past 72 hour(s))   SODIUM SERUM (NA)    Collection Time: 02/13/23  5:27 AM   Result Value Ref Range    Sodium 136 135 - 145 mmol/L       Scheduled Medications   Medication Dose Frequency    hydrALAZINE  75 mg Q8HRS    sodium chloride  1 g TID WITH MEALS    baclofen  5 mg TID    senna-docusate  2 Tablet BID    And    polyethylene glycol/lytes  1 Packet DAILY    losartan  100 mg Q DAY    atorvastatin  80 mg Q EVENING    amLODIPine  10 mg Q DAY    cloNIDine  1 Patch Q7 DAYS    enoxaparin (LOVENOX) injection  40 mg DAILY AT 1800       Current Diet Order   Procedures    Diet Order Diet: Regular (Meds one at a time with liquid wash, larger pills in " applesauce); Fluid modifications: (optional): 1500 ml Fluid Restriction       Assessment:    This patient is a 57 y.o. female admitted for acute inpatient rehabilitation with Hemorrhagic stroke (HCC).    I led and attended the weekly conference, and agree with the IDT conference documentation and plan of care as noted below.    Date of conference: 2/8/2023    Goals and barriers: See IDT note.    Biggest barriers: Right hemiparesis, impulsive    CM/social support: Family to provide 24/7    Anticipated DC date: 3/7    Home health: PT/OT/SLP/RN    Equip: To be determined    Follow up: PCP, [facility], Dr. De La Torre      Problem List/Medical Decision Making and Plan:    Left basal hemorrhagic ganglia stroke  From uncontrolled hypertension  Right hemiparesis, improved  Right sarahy-anesthesia, improved  Cognitive impairment, continues  Dysphagia, resolved  Cognitive impairment, poor insight, continues  Impulsive, continues  Mild expressive aphasia/anomia, improved  Visual impairment, improved  Double vision, improved  Right neglect, continues  Continue full rehab program  PT/OT/SLP, 1 hr each discipline, 5 days per week  [facility] therapy  Recreational therapy    Outpatient follow up with stroke [facility] clinic, Dr. De La Torre/PMR, referrals made    Neuropathic pain, improved  In the right arm  Discontinued gabapentin as patient does not want to take it    Spasticity  Continue baclofen  Monitor need to increase dose  Outpatient follow-up with Dr. 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  Physical Medicine and Rehabilitation

## 2023-02-14 NOTE — THERAPY
Speech Language Pathology  Daily Treatment     Patient Name: Anali Pan  Age:  57 y.o., Sex:  female  Medical Record #: [id number]  Today's Date: 2/14/2023     Precautions  Precautions: Fall Risk  Comments: R hemiplegia, absent R UE/LE light touch sensation    Subjective    Patient found seated in wc at bedside. Patient agreeable to ST services and in a pleasant demeanor throughout the session.     Objective       02/14/23 1231   Expressive Language   Word Finding Deficits Supervision (5)   Cognition   Functional Problem Solving Supervision (5)   Planning / Decision Making Supervision (5)   Functional Math / Financial Management Supervision (5)   Medication Management  Supervision (5)       Assessment    Medication management continued today. Patient asked to sort her current regular medications into a 7-day, 3-time-a-day (21 compartment) pill box. Patient independently placed all medication into the correct days and times given some verbal supports from the clinician. Pt demonstrated ability to problem solve, plan, organize, and attend to the task given supervision.    Functional trip-planning activity completed. Patient was asked to use the internet to look up flight and rental care information given instructions from destination and websites to visit. Some modifications made per patient suggestion. Patient demonstrated independent use of travel websites to find flight information, times, and costs in order to determine the most affordable options given a budget. Demonstrated independent functional math and problem solving skills.    Complex word finding task completed with 100% accuracy given relational cues.    Strengths: Able to follow instructions, Willingly participates in therapeutic activities, Motivated for self care and independence, Pleasant and cooperative, Independent prior level of function  Barriers: Impaired insight/denial of deficits, Aspiration risk    Plan    Continue med management; attn;  complex word finding; functional math      Speech Therapy Problems (Active)       Problem: Comprehension STGs       Dates: Start: 02/07/23         Goal: STG-Within one week, patient will follow 2-step directions with 80% accuracy given min A       Dates: Start: 02/07/23               Problem: Expression STGs       Dates: Start: 02/07/23         Goal: STG-Within one week, patient will complete word-finding tasks (generative naming, categories) with 80% accuracy given min A       Dates: Start: 02/07/23               Problem: Memory STGs       Dates: Start: 02/07/23         Goal: STG-Within one week, patient will demonstrate delayed recall of new information with 80% accuracy with min A       Dates: Start: 02/07/23               Problem: Problem Solving STGs       Dates: Start: 02/07/23         Goal: STG-Within one week, patient will demonstrate sustained or alternating attention during structured tasks with 80% accuracy given min A        Dates: Start: 02/07/23               Problem: Speech/Swallowing LTGs       Dates: Start: 02/07/23         Goal: LTG-By discharge, patient will tolerate least restrictive diet with no s/s of aspiration        Dates: Start: 02/07/23            Goal: LTG-By discharge, patient will comprehend verbal and written information with 90% accuracy with mod I       Dates: Start: 02/07/23            Goal: LTG-By discharge, patient will express wants, needs, thoughts, and ideas with 90% accuracy with mod I       Dates: Start: 02/07/23            Goal: LTG-By discharge, patient will solve complex problems in structured and functional situations with 90% accuracy with spv        Dates: Start: 02/07/23               Problem: Swallowing STGs       Dates: Start: 02/07/23         Goal: STG-Within one week, patient will tolerated upgraded 7- Regular Textures  0-Thin liquids  diet with no s/s of aspiration        Dates: Start: 02/07/23

## 2023-02-14 NOTE — CARE PLAN
Problem: Knowledge Deficit - Standard  Goal: Patient and family/care givers will demonstrate understanding of plan of care, disease process/condition, diagnostic tests and medications  Outcome: Progressing     Problem: Skin Integrity  Goal: Patient's skin integrity will be maintained or improve  Outcome: Progressing   The patient is Stable - Low risk of patient condition declining or worsening    Shift Goals  Clinical Goals: Safety  Patient Goals: saftey    Progress made toward(s) clinical / shift goals:  Patient is resting in bed - denied pain or discomfort - using call light appropriately    Patient is not progressing towards the following goals:

## 2023-02-14 NOTE — THERAPY
"Occupational Therapy  Daily Treatment     Patient Name: Anali Pan  Age:  57 y.o., Sex:  female  Medical Record #: [id number]  Today's Date: 2/14/2023     Precautions  Precautions: Fall Risk  Comments: R hemiplegia, absent R UE/LE light touch sensation         Subjective    \"This was good, thank you.\" Pt reported at end of session      Objective     02/14/23 1401   OT Charge Group   OT Self Care / ADL (Units) 1   OT Neuromuscular Re-education / Balance (Units) 3   OT Total Time Spent   OT Individual Total Time Spent (Mins) 60   Functional Level of Assist   Grooming Supervision   Toileting Contact Guard Assist   Toileting Description Grab bar;Increased time;Supervision for safety   Toilet Transfers Contact Guard Assist   Toilet Transfer Description Grab bar;Increased time;Supervision for safety   Neuro-Muscular Treatments   Neuro-Muscular Treatments Anterior weight shift;Facilitation;Joint Approximation;Vibration;Weight Shift Right;Weight Shift Left   Comments see note for details   Interdisciplinary Plan of Care Collaboration   Patient Position at End of Therapy Seated;Self Releasing Lap Belt Applied;Call Light within Reach;Tray Table within Reach;Phone within Reach     Quadruped for increased proprioception through RUE/RLE with mirror in front of pt   - all 4's x 3 minutes   - weight shifting L/R  - weight shifting forward/backward  - lifting LUE off mat 2 sets x 10 reps with therapist blocking R elbow/overpressure to R hand   -attempted lifting LLE off mat- mat difficulty and pain reported in B wrists     Transitioned to tall kneeling positioning- up/down 5x     Attempted side lying on RUE for increased proprioception- pt having max difficulty sequencing how to get into side lying position- only able to maintain x 1 minute    Shoulder extension with straight arms in external rotation- leaning back x 5 minutes     Vibration applied to RUE/RLE for sensory input; had pt close eyes and identify where vibration " being placed on arm/leg- pt only able to accuracy tell 3/10 - hand, thigh and back of knee- vibrator left in pt's room to continue working on sensory integration.     Assessment    Pt tolerated session well. Pt motivated to work on neuro re-ed for RUE/RLE. See above for details about exercises. Noted improvement in tone in R hand after WB activities. Pt having difficulty with identifying sensation on RUE/RLE with vibration. Noted improvement in toileting this session aeb pt was able to doff/don pants/underwear on her own in standing facing the grab bar- which previously she required assist with this task. Educated pt on how to doff/don arm trough for RUE positioning in the w/c. Noted increased swelling in LLE- discussed having pt wear KT hose on LLE as well as RLE and educated pt on making sure to elevate legs throughout the day- pt might benefit from reminder on wall board to elevate legs.     Strengths: Able to follow instructions, Alert and oriented, Effective communication skills, Good insight into deficits/needs, Independent prior level of function, Making steady progress towards goals, Manages pain appropriately, Motivated for self care and independence, Pleasant and cooperative, Supportive family, Willingly participates in therapeutic activities  Barriers: Decreased endurance, Impaired activity tolerance, Impaired balance, Limited mobility, Hemiparesis    Plan  RUE Neuro re-ed, Vision (double vision/tracking/acuity), functional cognition, ADLs, activity tolerance. monitor BP for hypertension. Pt is not always symptomatic.   3x/wk w/ tech     Passport items to be completed:  Perform bathroom transfers, complete dressing, complete feeding, get ready for the day, prepare a simple meal, participate in household tasks, adapt home for safety needs, demonstrate home exercise program, complete caregiver training     Occupational Therapy Goals (Active)       Problem: Bathing       Dates: Start: 02/07/23          Goal: STG-Within one week, patient will bathe w/ min A.        Dates: Start: 02/07/23               Problem: Dressing       Dates: Start: 02/07/23         Goal: STG-Within one week, patient will dress UB w/ supervision.        Dates: Start: 02/07/23            Goal: STG-Within one week, patient will dress LB w/ min A.        Dates: Start: 02/07/23               Problem: Functional Transfers       Dates: Start: 02/07/23         Goal: STG-Within one week, patient will transfer to toilet w/ CGA.       Dates: Start: 02/07/23            Goal: STG-Within one week, patient will transfer to step in shower w/ CGA.       Dates: Start: 02/07/23               Problem: OT Long Term Goals       Dates: Start: 02/07/23         Goal: LTG-By discharge, patient will complete basic self care tasks w/ mod I - min A.       Dates: Start: 02/07/23            Goal: LTG-By discharge, patient will perform bathroom transfers w/ mod I - min A.       Dates: Start: 02/07/23               Problem: Toileting       Dates: Start: 02/07/23         Goal: STG-Within one week, patient will complete toileting tasks w/ min.       Dates: Start: 02/07/23

## 2023-02-15 PROCEDURE — 97113 AQUATIC THERAPY/EXERCISES: CPT | Mod: CQ

## 2023-02-15 PROCEDURE — 97535 SELF CARE MNGMENT TRAINING: CPT

## 2023-02-15 PROCEDURE — 97116 GAIT TRAINING THERAPY: CPT

## 2023-02-15 PROCEDURE — 770010 HCHG ROOM/CARE - REHAB SEMI PRIVAT*

## 2023-02-15 PROCEDURE — 97129 THER IVNTJ 1ST 15 MIN: CPT

## 2023-02-15 PROCEDURE — 97112 NEUROMUSCULAR REEDUCATION: CPT

## 2023-02-15 PROCEDURE — A9270 NON-COVERED ITEM OR SERVICE: HCPCS | Performed by: PHYSICAL MEDICINE & REHABILITATION

## 2023-02-15 PROCEDURE — 97130 THER IVNTJ EA ADDL 15 MIN: CPT

## 2023-02-15 PROCEDURE — 99232 SBSQ HOSP IP/OBS MODERATE 35: CPT | Performed by: PHYSICAL MEDICINE & REHABILITATION

## 2023-02-15 PROCEDURE — 97530 THERAPEUTIC ACTIVITIES: CPT

## 2023-02-15 PROCEDURE — 700102 HCHG RX REV CODE 250 W/ 637 OVERRIDE(OP): Performed by: PHYSICAL MEDICINE & REHABILITATION

## 2023-02-15 PROCEDURE — 700111 HCHG RX REV CODE 636 W/ 250 OVERRIDE (IP): Performed by: PHYSICAL MEDICINE & REHABILITATION

## 2023-02-15 RX ORDER — BACLOFEN 10 MG/1
10 TABLET ORAL 3 TIMES DAILY
Status: DISCONTINUED | OUTPATIENT
Start: 2023-02-15 | End: 2023-02-16

## 2023-02-15 RX ADMIN — HYDRALAZINE HYDROCHLORIDE 75 MG: 25 TABLET, FILM COATED ORAL at 05:44

## 2023-02-15 RX ADMIN — AMLODIPINE BESYLATE 10 MG: 5 TABLET ORAL at 05:45

## 2023-02-15 RX ADMIN — LOSARTAN POTASSIUM 100 MG: 25 TABLET, FILM COATED ORAL at 05:44

## 2023-02-15 RX ADMIN — HYDRALAZINE HYDROCHLORIDE 75 MG: 25 TABLET, FILM COATED ORAL at 14:22

## 2023-02-15 RX ADMIN — SODIUM CHLORIDE 1 G: 1 TABLET ORAL at 12:01

## 2023-02-15 RX ADMIN — ATORVASTATIN CALCIUM 80 MG: 40 TABLET, FILM COATED ORAL at 21:35

## 2023-02-15 RX ADMIN — BACLOFEN 10 MG: 10 TABLET ORAL at 21:36

## 2023-02-15 RX ADMIN — HYDRALAZINE HYDROCHLORIDE 75 MG: 25 TABLET, FILM COATED ORAL at 21:35

## 2023-02-15 RX ADMIN — BACLOFEN 5 MG: 10 TABLET ORAL at 08:35

## 2023-02-15 RX ADMIN — BACLOFEN 5 MG: 10 TABLET ORAL at 14:22

## 2023-02-15 RX ADMIN — SODIUM CHLORIDE 1 G: 1 TABLET ORAL at 08:35

## 2023-02-15 RX ADMIN — ENOXAPARIN SODIUM 40 MG: 40 INJECTION SUBCUTANEOUS at 17:17

## 2023-02-15 RX ADMIN — SODIUM CHLORIDE 1 G: 1 TABLET ORAL at 17:17

## 2023-02-15 ASSESSMENT — ACTIVITIES OF DAILY LIVING (ADL)
TOILET_TRANSFER_DESCRIPTION: GRAB BAR;ADAPTIVE EQUIPMENT;SUPERVISION FOR SAFETY
BED_CHAIR_WHEELCHAIR_TRANSFER_DESCRIPTION: INCREASED TIME;SUPERVISION FOR SAFETY;VERBAL CUEING
TOILETING_LEVEL_OF_ASSIST_DESCRIPTION: GRAB BAR;INCREASED TIME;SUPERVISION FOR SAFETY;VERBAL CUEING
TOILET_TRANSFER_DESCRIPTION: GRAB BAR;INCREASED TIME;SUPERVISION FOR SAFETY
TUB_SHOWER_TRANSFER_DESCRIPTION: GRAB BAR;SHOWER BENCH;VERBAL CUEING

## 2023-02-15 ASSESSMENT — GAIT ASSESSMENTS
DISTANCE (FEET): 120
ASSISTIVE DEVICE: QUAD CANE
GAIT LEVEL OF ASSIST: MINIMAL ASSIST
DEVIATION: ATAXIC;DECREASED BASE OF SUPPORT;STEP TO

## 2023-02-15 NOTE — CARE PLAN
Problem: Bathing  Goal: STG-Within one week, patient will bathe w/ min A.   Outcome: Met     Problem: Dressing  Goal: STG-Within one week, patient will dress UB w/ supervision.   Outcome: Met  Goal: STG-Within one week, patient will dress LB w/ min A.   Outcome: Met     Problem: Functional Transfers  Goal: STG-Within one week, patient will transfer to toilet w/ CGA.  Outcome: Met  Goal: STG-Within one week, patient will transfer to step in shower w/ CGA.  Outcome: Met

## 2023-02-15 NOTE — DISCHARGE PLANNING
CM LM on sister Jeanna's VM to call CM to update on IDT and DC date of 3/6/23, confirm family support and discuss what patients PLOF was before hospitalization.  CM will continue to monitor for DC needs.

## 2023-02-15 NOTE — PROGRESS NOTES
"REHABILITATION PSYCHOLOGY FOLLOW-UP:  Reason for admission: Nontraumatic acute hemorrhage of basal ganglia (HCC) [I61.9]  Hemorrhagic stroke (HCC) [I61.9]  Length of Visit: 18 minutes    Chief Complaint: Post stroke recovery    S: Met with the patient for brief individual psychotherapy. Patient presented with a euthymic affect and reported a \"doing pretty well\" mood.  Patient reports enjoying engaging with therapies, and is overall in good spirits.  We will continue to follow through discharge    O: Psychiatric Examination:  Vitals: Blood pressure 139/83, pulse 84, temperature 36.6 °C (97.9 °F), temperature source Oral, resp. rate 20, height 1.676 m (5' 6\"), weight 79.4 kg (175 lb), SpO2 96 %, not currently breastfeeding.  Musculoskeletal: Sitting in wheelchair normal psychomotor activity, no tics or unusual mannerisms noted  Appearance and Eye Contact: Easily established rapport WDWN, appropriate dress and grooming. Behavior is calm, cooperative,  appropriate eye contact  Attention/Alertness: Alert  Thought Process: Linear logical goal directed  Thought Content: No psychotic processes noted  Speech: Clear with normal rate and rhythm  Mood: \"A little overwhelmed\"            Affect: Euthymic somewhat tearful         SI/HI: Denies     Memory: Recent and remote memory appear intact      Orientation: Alert and oriented to person place and time  Insight into symptoms: Within normal limits  Judgement into symptoms: Within normal limits     Neuropsychological Testing:   Not formally tested              ASSESSMENT: Anali Pan is a 57 y.o.  female with a past medical history of hypertension noncompliant on antihypertensive medications and alcohol use of approximately 5-6 drinks per day;  who presented on 1/31/2023 11:17 PM as a transfer from [facility] with right-sided weakness.  Per documentation, patient had acute onset right-sided weakness and difficulty speaking, she arrived at the outside hospital with " SBP up to 200.  A CT head was obtained which showed an acute left thalamic hemorrhage patient was transferred to [facility] for higher level of care.  Patient was admitted to the ICU with strict SBP goal of 100-1 40.  She was placed on a nicardipine drip for blood pressure control.  An MRI brain was obtained with evidence of an acute/subacute hemorrhage in the left thalamus adjacent to the parietal corona radiata with mass effect upon the posterior third ventricle with slight midline shift measuring 4 to 5 mm there is no evidence of abnormality relating to underlying lesion or abnormal vascularity.  Patient's hospital course has been complicated by onset of alcohol withdrawal, patient had poor participation with therapy due to anxiety and unwillingness to complete full commands for therapy session.  Patient is on a Precedex drip for agitation.  Echocardiogram was obtained with an EF of 70%, grade 1 diastolic dysfunction with evidence right right to left shift/PFO     Psychology was consulted due to difficulty with adjustment to post stroke recovery.  Session focused on assessment of current functioning as well as psychoeducation regarding the common cognitive and emotional impacts of stroke and stroke recovery as well as cognitive expectations for post stroke recovery.  We will continue to follow     DSM5 Diagnostic Considerations: Adjustment disorder with mixed anxiety and depressed mood        PLAN:  Records reviewed: Yes  Discussed patient with other provider: Gaye  We will continue to follow  Thank you for the consult.     Indy Hernandez, Ph.D

## 2023-02-15 NOTE — PROGRESS NOTES
"Rehab Progress Note     Date of Service: 2/15/2023  Chief Complaint: follow up stroke    Interval Events (Subjective)    Patient seen and examined today in the therapy gym.   She is ready to do pool therapy with PT.  Her blood pressure has normalized.  She is taking the baclofen.  She denies any pain.    Patient has no other new questions, concerns, or complaints today.       ROS: No changes to bowel, bladder, pain, mood, or sleep.       Objective:  VITAL SIGNS: /83   Pulse 84   Temp 36.6 °C (97.9 °F) (Oral)   Resp 20   Ht 1.676 m (5' 6\")   Wt 79.4 kg (175 lb)   LMP  (LMP Unknown)   SpO2 96%   BMI 28.25 kg/m²   Gen: alert, no apparent distress  CV: Regular rate, regular rhythm  Resp: Clear to auscultation bilaterally  Neuro: Right spastic hemiparesis        Recent Results (from the past 72 hour(s))   SODIUM SERUM (NA)    Collection Time: 02/13/23  5:27 AM   Result Value Ref Range    Sodium 136 135 - 145 mmol/L       Scheduled Medications   Medication Dose Frequency    hydrALAZINE  75 mg Q8HRS    sodium chloride  1 g TID WITH MEALS    baclofen  5 mg TID    senna-docusate  2 Tablet BID    And    polyethylene glycol/lytes  1 Packet DAILY    losartan  100 mg Q DAY    atorvastatin  80 mg Q EVENING    amLODIPine  10 mg Q DAY    cloNIDine  1 Patch Q7 DAYS    enoxaparin (LOVENOX) injection  40 mg DAILY AT 1800       Current Diet Order   Procedures    Diet Order Diet: Regular (Meds one at a time with liquid wash, larger pills in applesauce); Fluid modifications: (optional): 1500 ml Fluid Restriction       Assessment:    This patient is a 57 y.o. female admitted for acute inpatient rehabilitation with Hemorrhagic stroke (HCC).    I led and attended the weekly conference, and agree with the IDT conference documentation and plan of care as noted below.    Date of conference: 2/15/2023    Goals and barriers: See IDT note.    Biggest barriers: spasticity, right hemiparesis    CM/social support: Family to provide " 24/7    Anticipated DC date: 3/7    Home health: PT/OT/RN    Equip: To be determined    Follow up: PCP, [facility], Dr. De La Torre      Problem List/Medical Decision Making and Plan:    Left basal hemorrhagic ganglia stroke  From uncontrolled hypertension  Right hemiparesis, improved  Right sarahy-anesthesia, improved  Cognitive impairment, continues  Dysphagia, resolved  Cognitive impairment, poor insight, continues  Impulsive, continues  Mild expressive aphasia/anomia, improved  Visual impairment, improved  Double vision, improved  Right neglect, continues  Continue full rehab program  PT/OT/SLP, 1 hr each discipline, 5 days per week  Aqua therapy  Recreational therapy  2/15: discontinue SLP, PT/OT, 1.5 hr each discipline, 5 days per week      Outpatient follow up with stroke bridge clinic, Dr. De La Torre/PMR, referrals made    Neuropathic pain, improved  In the right arm  Discontinued gabapentin as patient does not want to take it    Spasticity  Continue baclofen, monitor need to increase  Outpatient follow-up with Dr. 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  Physical Medicine and Rehabilitation

## 2023-02-15 NOTE — THERAPY
Physical Therapy   Daily Treatment     Patient Name: Anali Pan  Age:  57 y.o., Sex:  female  Medical Record #: [id number]  Today's Date: 2/15/2023     Precautions  Precautions: Fall Risk  Comments: R hemiplegia, absent R UE/LE light touch sensation    Subjective    Pt very appreciative to participate In the pool today     Objective       02/15/23 1031   PT Charge Group   PT Aquatic Therapy  4   Supervising Physical Therapist José Luis Ibarra, C[facility]   PT Total Time Spent   PT Individual Total Time Spent (Mins) 60   Interdisciplinary Plan of Care Collaboration   IDT Collaboration with  Therapy Tech   Patient Position at End of Therapy Seated   Collaboration Comments transport to/from pool area       The patient participated in 60 minutes of aquatic therapy with 25  % weight bearing.  The patient ambulated with intermittent UE support (flotation pool noodle, L side HR) and #5lb ankle weights. Patient amb forward, backward, and side stepping.  -standing exercises including heel/toe lifts, marches, 3-way hip, hs curls, and mini squats with L UE support. SLS on R LE completing SL squats and heel lifts  -completed step ups initiatlly on aqua step (without UE support) however due to buoyancy assistance pt did not utilize quad and glute appropriately, transitioned step ups to the bottom of the pool step with L UE support with the patient practicing stepping up with the R LE and down with L and vice versa stepping up with L LE  and down with the R to practice foot placement on the impaired side. Pt presents with R LE tone and impaired coordination as well as poor proprioceptive awareness.    The patient entered and exited the pool via stairs with CGA     By end of the session pt practiced amb without looking down at her R foot    Assessment    The patient tolerated therapy session well focusing progression functional mobility. Tx session focused on increased weight acceptance R LE to promote symmetrical stride length.    The  patient will benefit from aquatic therapy to enhance overall functional outcome   Strengths: Able to follow instructions, Alert and oriented, Effective communication skills, Good carryover of learning, Good endurance, Independent prior level of function  Barriers: Generalized weakness, Hemiparesis, Impaired balance    Plan    Trial building up handle of FWW for improved grasp, gait FWW vs QC with R ace wrap for DF/eversion, NRE for R coordination and forced use     Passport items to be completed:  Get in/out of bed safely, in/out of a vehicle, safely use mobility device, walk or wheel around home/community, navigate up and down stairs, show how to get up/down from the ground, ensure home is accessible, demonstrate HEP, complete caregiver training    Physical Therapy Problems (Active)       Problem: Mobility Transfers       Dates: Start: 02/07/23         Goal: STG-Within one week, patient will perform bed mobility independently       Dates: Start: 02/07/23               Problem: PT-Long Term Goals       Dates: Start: 02/07/23         Goal: LTG-By discharge, patient will propel wheelchair 150ft [place]       Dates: Start: 02/07/23            Goal: LTG-By discharge, patient will ambulate 150ft with LRAD and CGA       Dates: Start: 02/07/23            Goal: LTG-By discharge, patient will transfer one surface to another [place]       Dates: Start: 02/07/23            Goal: LTG-By discharge, patient will perform home exercise program independently       Dates: Start: 02/07/23            Goal: LTG-By discharge, patient will ambulate up/down 1 curb step with [place]       Dates: Start: 02/07/23

## 2023-02-15 NOTE — THERAPY
Recreational Therapy  Daily Treatment     Patient Name: Anali Pan  AGE:  57 y.o., SEX:  female  Medical Record #: [id number]  Today's Date: 2/14/2023       Subjective    Patient was ready and cooperative for recreation therapy session.      Objective       02/14/23 1531   Procedural Tracking   Procedural Tracking Leisure Skills Awareness;Leisure Skills Development;Cognitive Skills Training;Fine Motor Functional Leisure Skills;Gross Motor Functional Leisure Skills   Treatment Time   Total Time Spent (mins) 30   Total Time Missed 0   Functional Ability Status - Cognitive   Attention Span Remains on Task with Cueing   Comprehension Follows Two Step Commands   Judgment Able to Make Independent Decisions   Functional Ability Status - Emotional    Affect Appropriate   Mood Appropriate   Behavior Appropriate;Cooperative   Leisure Competence Measure   Leisure Awareness Minimal Assist   Leisure Attitude Minimal Assist   Leisure Skills Minimal Assist   Skilled Intervention    Skilled Intervention Gross Motor Leisure;Fine Motor Leisure;Cognitive Leisure;Leisure Education    Interdisciplinary Plan of Care Collaboration   Patient Position at End of Therapy Seated;Chair Alarm On;Phone within Reach;Tray Table within Reach;Call Light within Reach   Strengths & Barriers   Strengths Able to follow instructions;Alert and oriented;Effective communication skills;Good carryover of learning;Good insight into deficits/needs;Making steady progress towards goals;Pleasant and cooperative;Willingly participates in therapeutic activities   Barriers Decreased endurance;Fatigue;Pain;Impaired balance;Visual impairment      Patient participated in new game, pSivida, during Recreation therapy session. Patient was asked to stand for 2 min x 3, successfully completing 2 min x 2. The third round patient stood for 45 seconds when she began to feel dizzy. Upon noting dizziness, patient sat down. Patient rested RUE on table while playing. Client  used LUE for fine motor aspects of play.     Assessment    Patient is 57 y.o. female with a past medical history of hypertension noncompliant on antihypertensive medications and a diagnosis of Stroke: Right Body Involvement (Left Brain    Strengths: Able to follow instructions, Alert and oriented, Effective communication skills, Good carryover of learning, Good insight into deficits/needs, Making steady progress towards goals, Pleasant and cooperative, Willingly participates in therapeutic activities  Barriers: Decreased endurance, Fatigue, Pain, Impaired balance, Visual impairment    Plan    Patient will benefit from continued recreation therapy during her stay, 2-3 times per week. Patient is making steady progress towards goals.     Passport items to be completed:  Verbalize two positive leisure activities, discuss returning to work, hobbies, community groups or volunteer activities, explore community resources

## 2023-02-15 NOTE — CARE PLAN
Problem: Knowledge Deficit - Standard  Goal: Patient and family/care givers will demonstrate understanding of plan of care, disease process/condition, diagnostic tests and medications  Outcome: Progressing     Problem: Fall Risk - Rehab  Goal: Patient will remain free from falls  Outcome: Progressing  Note: Karime Olivarez Fall risk Assessment Score: 8    Low fall risk interventions   - Call light within reach   - Yellow  socks   - Belongings within reach   - Bed in the lowest position

## 2023-02-15 NOTE — THERAPY
Occupational Therapy  Daily Treatment     Patient Name: Anali Pan  Age:  57 y.o., Sex:  female  Medical Record #: [id number]  Today's Date: 2/15/2023     Precautions  Precautions: (P) Fall Risk  Comments: (P) R hemiplegia, absent R UE/LE light touch sensation         Subjective    Pt agreeable to pre pool shower     Objective       02/15/23 0701   OT Charge Group   OT Self Care / ADL (Units) 3   OT Neuromuscular Re-education / Balance (Units) 1   OT Total Time Spent   OT Individual Total Time Spent (Mins) 60   Precautions   Precautions Fall Risk   Comments R hemiplegia, absent R UE/LE light touch sensation   Strength Upper Body   Rt Shoulder Flexion Strength 3+ (F+)   Rt Shoulder Extension Strength 3- (F-)   Rt Shoulder Abduction Strength 3- (F-)   Rt Elbow Flexion Strength 3+ (F+)   Rt Elbow Extension Strength 3+ (F+)   Functional Level of Assist   Grooming Modified Independent   Grooming Description Seated in wheelchair at sink;Increased time   Bathing Standby Assist   Bathing Description Grab bar;Hand held shower;Tub bench;Increased time;Supervision for safety   Upper Body Dressing Supervision   Upper Body Dressing Description Increased time;Initial preparation for task;Supervision for safety;Verbal cueing   Lower Body Dressing Contact Guard Assist   Lower Body Dressing Description Grab bar;Increased time;Verbal cueing;Initial preparation for task   Toileting Standby Assist   Toileting Description Grab bar;Increased time;Supervision for safety;Verbal cueing   Bed, Chair, Wheelchair Transfer Contact Guard Assist   Bed Chair Wheelchair Transfer Description Increased time;Supervision for safety;Verbal cueing   Toilet Transfers Standby Assist   Toilet Transfer Description Grab bar;Increased time;Supervision for safety   Tub / Shower Transfers Standby Assist   Tub Shower Transfer Description Grab bar;Shower bench;Verbal cueing   Interdisciplinary Plan of Care Collaboration   Patient Position at End of Therapy  Seated;Phone within Reach;Tray Table within Reach;Call Light within Reach         Assessment    Pt demonstrated increased independence with ADLs throughout session. Pt's increase in balance and strength allowed pt to complete functional transfers with SBA-CGA. Pt also demonstrated increased strength and coordination in RUE. Pt cont to be motivated to increase overall strength, function, and independence.   Strengths: Able to follow instructions, Alert and oriented, Effective communication skills, Good insight into deficits/needs, Independent prior level of function, Making steady progress towards goals, Manages pain appropriately, Motivated for self care and independence, Pleasant and cooperative, Supportive family, Willingly participates in therapeutic activities  Barriers: Decreased endurance, Impaired activity tolerance, Impaired balance, Limited mobility, Hemiparesis    Plan    RUE Neuro re-ed, Vision (double vision/tracking/acuity), functional cognition, ADLs, activity tolerance. monitor BP for hypertension. Pt is not always symptomatic.   3x/wk w/ tech       Occupational Therapy Goals (Active)       Problem: Bathing       Dates: Start: 02/07/23         Goal: STG-Within one week, patient will bathe w/ min A.        Dates: Start: 02/07/23               Problem: Dressing       Dates: Start: 02/07/23         Goal: STG-Within one week, patient will dress UB w/ supervision.        Dates: Start: 02/07/23            Goal: STG-Within one week, patient will dress LB w/ min A.        Dates: Start: 02/07/23               Problem: Functional Transfers       Dates: Start: 02/07/23         Goal: STG-Within one week, patient will transfer to toilet w/ CGA.       Dates: Start: 02/07/23            Goal: STG-Within one week, patient will transfer to step in shower w/ CGA.       Dates: Start: 02/07/23               Problem: OT Long Term Goals       Dates: Start: 02/07/23         Goal: LTG-By discharge, patient will complete  basic self care tasks w/ mod I - min A.       Dates: Start: 02/07/23            Goal: LTG-By discharge, patient will perform bathroom transfers w/ mod I - min A.       Dates: Start: 02/07/23               Problem: Toileting       Dates: Start: 02/07/23         Goal: STG-Within one week, patient will complete toileting tasks w/ min.       Dates: Start: 02/07/23

## 2023-02-15 NOTE — THERAPY
Speech Language Pathology  Daily Treatment     Patient Name: Anali Pan  Age:  57 y.o., Sex:  female  Medical Record #: [id number]  Today's Date: 2/15/2023     Precautions  Precautions: Fall Risk  Comments: R hemiplegia, absent R UE/LE light touch sensation    Subjective    Patient found seated in WC at bedside. Pt pleasant and agreeable to ST services. Pt reports baseline cognition and desires more therapy time with PT and OT. Pt progress and goals for ST discussed.     Objective       02/15/23 0901   Cognition   Functional Math / Financial Management Within Functional Limits (6-7)       Assessment    Functional math activity completed; pt required to answer questions given restaurant menus. Pt independently answered all questions with 100% accuracy after independently self correcting. Occasional cues for pacing with use of calculator. Occasional visual/verbal cues to attend to visual target.    Strengths: Able to follow instructions, Alert and oriented, Effective communication skills, Good carryover of learning, Good endurance, Good insight into deficits/needs, Independent prior level of function, Making steady progress towards goals, Motivated for self care and independence, Pleasant and cooperative, Supportive family, Willingly participates in therapeutic activities  Barriers: Visual impairment    Plan    D/C speech therapy; all goals met.      Speech Therapy Problems (Active)       Problem: Swallowing STGs       Dates: Start: 02/07/23

## 2023-02-15 NOTE — CARE PLAN
Problem: Mobility Transfers  Goal: STG-Within one week, patient will perform bed mobility independently  Outcome: Progressing     Problem: Mobility  Goal: STG-Within one week, patient will propel wheelchair 100ft with SPV  Outcome: Met  Goal: STG-Within one week, patient will ambulate 15ft with LRAD with modA and WC follow  Outcome: Met     Problem: Mobility Transfers  Goal: STG-Within one week, patient will transfer bed to chair with CGA  Outcome: Met

## 2023-02-15 NOTE — CARE PLAN
The patient is Stable - Low risk of patient condition declining or worsening    Shift Goals  Clinical Goals: Safety  Patient Goals: saftey    Progress made toward(s) clinical / shift goals:    Problem: Fall Risk - Rehab  Goal: Patient will remain free from falls  Outcome: Progressing. Patient bed in lowest locked position with bed alarm on and call light within reach. Patient calls appropriately with call light for needs.      Problem: Pain - Standard  Goal: Alleviation of pain or a reduction in pain to the patient’s comfort goal  Outcome: Progressing. Patient denies having any pain over shift. Has prn tylenol available for pain.

## 2023-02-15 NOTE — CARE PLAN
Problem: Speech/Swallowing LTGs  Goal: LTG-By discharge, patient will tolerate least restrictive diet with no s/s of aspiration   Outcome: Met  Goal: LTG-By discharge, patient will comprehend verbal and written information with 90% accuracy with mod I  Outcome: Met  Goal: LTG-By discharge, patient will express wants, needs, thoughts, and ideas with 90% accuracy with mod I  Outcome: Met  Goal: LTG-By discharge, patient will solve complex problems in structured and functional situations with 90% accuracy with spv   Outcome: Met     Problem: Swallowing STGs  Goal: STG-Within one week, patient will tolerated upgraded 7- Regular Textures  0-Thin liquids  diet with no s/s of aspiration   Outcome: Met     Problem: Comprehension STGs  Goal: STG-Within one week, patient will follow 2-step directions with 80% accuracy given min A  Outcome: Met     Problem: Expression STGs  Goal: STG-Within one week, patient will complete word-finding tasks (generative naming, categories) with 80% accuracy given min A  Outcome: Met     Problem: Problem Solving STGs  Goal: STG-Within one week, patient will demonstrate sustained or alternating attention during structured tasks with 80% accuracy given min A   Outcome: Met     Problem: Memory STGs  Goal: STG-Within one week, patient will demonstrate delayed recall of new information with 80% accuracy with min A  Outcome: Met

## 2023-02-16 ENCOUNTER — APPOINTMENT (OUTPATIENT)
Dept: PHYSICAL THERAPY | Facility: REHABILITATION | Age: 58
DRG: 057 | End: 2023-02-16
Attending: PHYSICAL MEDICINE & REHABILITATION
Payer: COMMERCIAL

## 2023-02-16 ENCOUNTER — APPOINTMENT (OUTPATIENT)
Dept: OCCUPATIONAL THERAPY | Facility: REHABILITATION | Age: 58
DRG: 057 | End: 2023-02-16
Attending: PHYSICAL MEDICINE & REHABILITATION
Payer: COMMERCIAL

## 2023-02-16 LAB
APPEARANCE UR: CLEAR
BACTERIA #/AREA URNS HPF: NEGATIVE /HPF
BILIRUB UR QL STRIP.AUTO: NEGATIVE
COLOR UR: YELLOW
EPI CELLS #/AREA URNS HPF: ABNORMAL /HPF
ERYTHROCYTE [DISTWIDTH] IN BLOOD BY AUTOMATED COUNT: 39.9 FL (ref 35.9–50)
GLUCOSE UR STRIP.AUTO-MCNC: NEGATIVE MG/DL
HCT VFR BLD AUTO: 37.5 % (ref 37–47)
HGB BLD-MCNC: 13.1 G/DL (ref 12–16)
HYALINE CASTS #/AREA URNS LPF: ABNORMAL /LPF
KETONES UR STRIP.AUTO-MCNC: NEGATIVE MG/DL
LEUKOCYTE ESTERASE UR QL STRIP.AUTO: ABNORMAL
MCH RBC QN AUTO: 31.8 PG (ref 27–33)
MCHC RBC AUTO-ENTMCNC: 34.9 G/DL (ref 33.6–35)
MCV RBC AUTO: 91 FL (ref 81.4–97.8)
MICRO URNS: ABNORMAL
NITRITE UR QL STRIP.AUTO: NEGATIVE
PH UR STRIP.AUTO: 6.5 [PH] (ref 5–8)
PLATELET # BLD AUTO: 401 K/UL (ref 164–446)
PMV BLD AUTO: 9.2 FL (ref 9–12.9)
PROT UR QL STRIP: NEGATIVE MG/DL
RBC # BLD AUTO: 4.12 M/UL (ref 4.2–5.4)
RBC # URNS HPF: ABNORMAL /HPF
RBC UR QL AUTO: NEGATIVE
SODIUM SERPL-SCNC: 138 MMOL/L (ref 135–145)
SP GR UR STRIP.AUTO: 1.01
UROBILINOGEN UR STRIP.AUTO-MCNC: 0.2 MG/DL
WBC # BLD AUTO: 6.3 K/UL (ref 4.8–10.8)
WBC #/AREA URNS HPF: ABNORMAL /HPF

## 2023-02-16 PROCEDURE — 84295 ASSAY OF SERUM SODIUM: CPT

## 2023-02-16 PROCEDURE — 81001 URINALYSIS AUTO W/SCOPE: CPT

## 2023-02-16 PROCEDURE — 770010 HCHG ROOM/CARE - REHAB SEMI PRIVAT*

## 2023-02-16 PROCEDURE — 97112 NEUROMUSCULAR REEDUCATION: CPT

## 2023-02-16 PROCEDURE — 87086 URINE CULTURE/COLONY COUNT: CPT

## 2023-02-16 PROCEDURE — A9270 NON-COVERED ITEM OR SERVICE: HCPCS | Performed by: PHYSICAL MEDICINE & REHABILITATION

## 2023-02-16 PROCEDURE — 700111 HCHG RX REV CODE 636 W/ 250 OVERRIDE (IP): Performed by: PHYSICAL MEDICINE & REHABILITATION

## 2023-02-16 PROCEDURE — 36415 COLL VENOUS BLD VENIPUNCTURE: CPT

## 2023-02-16 PROCEDURE — 97116 GAIT TRAINING THERAPY: CPT

## 2023-02-16 PROCEDURE — 99232 SBSQ HOSP IP/OBS MODERATE 35: CPT | Performed by: PHYSICAL MEDICINE & REHABILITATION

## 2023-02-16 PROCEDURE — 97530 THERAPEUTIC ACTIVITIES: CPT

## 2023-02-16 PROCEDURE — 700102 HCHG RX REV CODE 250 W/ 637 OVERRIDE(OP): Performed by: PHYSICAL MEDICINE & REHABILITATION

## 2023-02-16 PROCEDURE — 97535 SELF CARE MNGMENT TRAINING: CPT

## 2023-02-16 PROCEDURE — 85027 COMPLETE CBC AUTOMATED: CPT

## 2023-02-16 RX ORDER — BACLOFEN 10 MG/1
5 TABLET ORAL 3 TIMES DAILY
Status: DISCONTINUED | OUTPATIENT
Start: 2023-02-16 | End: 2023-02-24 | Stop reason: HOSPADM

## 2023-02-16 RX ADMIN — BACLOFEN 10 MG: 10 TABLET ORAL at 08:13

## 2023-02-16 RX ADMIN — HYDRALAZINE HYDROCHLORIDE 75 MG: 25 TABLET, FILM COATED ORAL at 21:11

## 2023-02-16 RX ADMIN — AMLODIPINE BESYLATE 10 MG: 5 TABLET ORAL at 06:02

## 2023-02-16 RX ADMIN — BACLOFEN 5 MG: 10 TABLET ORAL at 14:43

## 2023-02-16 RX ADMIN — ENOXAPARIN SODIUM 40 MG: 40 INJECTION SUBCUTANEOUS at 17:13

## 2023-02-16 RX ADMIN — ACETAMINOPHEN 650 MG: 325 TABLET ORAL at 07:28

## 2023-02-16 RX ADMIN — HYDRALAZINE HYDROCHLORIDE 75 MG: 25 TABLET, FILM COATED ORAL at 14:43

## 2023-02-16 RX ADMIN — BACLOFEN 5 MG: 10 TABLET ORAL at 21:11

## 2023-02-16 RX ADMIN — HYDRALAZINE HYDROCHLORIDE 75 MG: 25 TABLET, FILM COATED ORAL at 06:02

## 2023-02-16 RX ADMIN — ATORVASTATIN CALCIUM 80 MG: 40 TABLET, FILM COATED ORAL at 21:11

## 2023-02-16 RX ADMIN — LOSARTAN POTASSIUM 100 MG: 25 TABLET, FILM COATED ORAL at 06:01

## 2023-02-16 RX ADMIN — SODIUM CHLORIDE 1 G: 1 TABLET ORAL at 08:13

## 2023-02-16 RX ADMIN — ALUMINUM HYDROXIDE, MAGNESIUM HYDROXIDE, AND DIMETHICONE 20 ML: 400; 400; 40 SUSPENSION ORAL at 19:57

## 2023-02-16 ASSESSMENT — GAIT ASSESSMENTS
DEVIATION: ANTALGIC;DECREASED BASE OF SUPPORT;STEP TO
GAIT LEVEL OF ASSIST: MINIMAL ASSIST
ASSISTIVE DEVICE: QUAD CANE
DISTANCE (FEET): 40
DISTANCE (FEET): 10
GAIT LEVEL OF ASSIST: MINIMAL ASSIST
DEVIATION: ATAXIC;DECREASED BASE OF SUPPORT;STEP TO
ASSISTIVE DEVICE: QUAD CANE

## 2023-02-16 ASSESSMENT — PATIENT HEALTH QUESTIONNAIRE - PHQ9
2. FEELING DOWN, DEPRESSED, IRRITABLE, OR HOPELESS: NOT AT ALL
1. LITTLE INTEREST OR PLEASURE IN DOING THINGS: NOT AT ALL
SUM OF ALL RESPONSES TO PHQ9 QUESTIONS 1 AND 2: 0

## 2023-02-16 ASSESSMENT — PAIN DESCRIPTION - PAIN TYPE: TYPE: ACUTE PAIN

## 2023-02-16 ASSESSMENT — ACTIVITIES OF DAILY LIVING (ADL)
TOILET_TRANSFER_DESCRIPTION: GRAB BAR;ADAPTIVE EQUIPMENT;INCREASED TIME
TOILETING_LEVEL_OF_ASSIST_DESCRIPTION: GRAB BAR;INCREASED TIME;SUPERVISION FOR SAFETY
BED_CHAIR_WHEELCHAIR_TRANSFER_DESCRIPTION: ADAPTIVE EQUIPMENT;INCREASED TIME;INITIAL PREPARATION FOR TASK;SUPERVISION FOR SAFETY;VERBAL CUEING

## 2023-02-16 NOTE — THERAPY
"Occupational Therapy  Daily Treatment     Patient Name: Anali Pan  Age:  57 y.o., Sex:  female  Medical Record #: [id number]  Today's Date: 2/16/2023     Precautions  Precautions: (P) Fall Risk  Comments: (P) R hemiplegia, absent R UE/LE light touch sensation         Subjective    Pt cont to perseverate on feeling \"off\" today.     Objective       02/16/23 1301   OT Charge Group   OT Self Care / ADL (Units) 1   OT Neuromuscular Re-education / Balance (Units) 2   OT Therapy Activity (Units) 1   OT Total Time Spent   OT Individual Total Time Spent (Mins) 60   Precautions   Precautions Fall Risk   Comments R hemiplegia, absent R UE/LE light touch sensation   Functional Level of Assist   Toileting Standby Assist   Toileting Description Grab bar;Increased time;Supervision for safety   Toilet Transfers Standby Assist   Toilet Transfer Description Grab bar;Adaptive equipment;Increased time   Neuro-Muscular Treatments   Neuro-Muscular Treatments Biofeedback;Facilitation;Joint Approximation   Comments See note for details   Interdisciplinary Plan of Care Collaboration   IDT Collaboration with    (OT student)   Patient Position at End of Therapy Seated;Phone within Reach;Tray Table within Reach;Call Light within Reach   Collaboration Comments observation     Neuro re-ed RUE    Pt stood EOM to complete WB through R hand w/ elbow extended to address sensation/proprioception. Pt complete grasp and release activity with koosh balls to address elbow flexion/extension and shoulder rotation. Pt required cues to focus on distal movements and not over compensate w/ shoulder and trunk.    Seated pt completed reaching activity to address RUE coordination and isolated muscle movements. Pt then completed 3 sets of 10 reps of bicep curls and chest press w/ non weighted dowel w/ mirror to give biofeedback. Pt required rest breaks and min A to complete exercises w/ RUE.       Assessment    Pt participated in activities focused on RUE " function and toileting. Pt cont to demonstrate increased coordination and strength w/ verbal/physical cues. Pt cont to be fixated on medication throughout session and was unable to understand purpose of Gabapentin vs Baclofen. Pt will benefit from cont education on medication and stroke rehabilitation.   Strengths: Able to follow instructions, Alert and oriented, Effective communication skills, Good insight into deficits/needs, Independent prior level of function, Making steady progress towards goals, Manages pain appropriately, Motivated for self care and independence, Pleasant and cooperative, Supportive family, Willingly participates in therapeutic activities  Barriers: Decreased endurance, Impaired activity tolerance, Impaired balance, Limited mobility, Hemiparesis    Plan    RUE Neuro re-ed, Vision (double vision/tracking/acuity), functional cognition, ADLs, activity tolerance. monitor BP for hypertension. Pt is not always symptomatic.   3x/wk w/ tech    Occupational Therapy Goals (Active)       Problem: Bathing       Dates: Start: 02/15/23         Goal: STG-Within one week, patient will bathe w/ supervision.        Dates: Start: 02/15/23               Problem: Dressing       Dates: Start: 02/15/23         Goal: STG-Within one week, patient will retrieve clothing and dress w/ SBA.        Dates: Start: 02/15/23               Problem: Functional Transfers       Dates: Start: 02/15/23         Goal: STG-Within one week, patient will transfer to toilet w/ supervision.        Dates: Start: 02/15/23            Goal: STG-Within one week, patient will transfer to tub/shower       Dates: Start: 02/15/23       Description: W/ SBA to simulate home set up.            Problem: IADL's       Dates: Start: 02/15/23         Goal: STG-Within one week, patient will access kitchen area w/ min A using LRD.        Dates: Start: 02/15/23               Problem: OT Long Term Goals       Dates: Start: 02/07/23         Goal: LTG-By  discharge, patient will complete basic self care tasks w/ mod I - min A.       Dates: Start: 02/07/23            Goal: LTG-By discharge, patient will perform bathroom transfers w/ mod I - min A.       Dates: Start: 02/07/23               Problem: Toileting       Dates: Start: 02/07/23         Goal: STG-Within one week, patient will complete toileting tasks w/ min.       Dates: Start: 02/07/23

## 2023-02-16 NOTE — THERAPY
"Physical Therapy   Daily Treatment     Patient Name: Anali Pan  Age:  57 y.o., Sex:  female  Medical Record #: [id number]  Today's Date: 2/15/2023     Precautions  Precautions: Fall Risk  Comments: R hemiplegia, absent R UE/LE light touch sensation    Subjective    \"The pool felt so nice today. I feel like I was floating\"     Objective       02/15/23 1401   PT Charge Group   PT Gait Training (Units) 2   PT Neuromuscular Re-Education / Balance (Units) 1   PT Therapeutic Activities (Units) 1   PT Total Time Spent   PT Individual Total Time Spent (Mins) 60   Gait Functional Level of Assist    Gait Level Of Assist Minimal Assist   Assistive Device Quad Cane   Distance (Feet) 120   # of Times Distance was Traveled 1  (as well as 60ftx3)   Deviation Ataxic;Decreased Base Of Support;Step To  (R extern AFO with improvement in R ankle inversion/plantarflexion in swing, inconsistent R knee hyperextension and buckling)   Wheelchair Functional Level of Assist   Wheelchair Assist Modified Independent   Distance Wheelchair (Feet or Distance) 200   Wheelchair Description   (hemitechnique)   Transfer Functional Level of Assist   Bed, Chair, Wheelchair Transfer Contact Guard Assist   Bed Chair Wheelchair Transfer Description   (NBQC)   Toilet Transfers Standby Assist   Toilet Transfer Description Grab bar;Adaptive equipment;Supervision for safety   Bed Mobility    Sit to Stand Contact Guard Assist   Neuro-Muscular Treatments   Comments sit to stand R LE on 2inch step forced use, seated R ankle DF/eversion x20 reps   Interdisciplinary Plan of Care Collaboration   IDT Collaboration with  Physical Therapist;Physician   Patient Position at End of Therapy Seated;Chair Alarm On;Self Releasing Lap Belt Applied;Call Light within Reach;Tray Table within Reach   Collaboration Comments AFO trial; increased dosage baclofen         Assessment    Pt demonstrated increased R UE flexor tone with exertion and R LE extensor synergy in gait. Pt's " R ankle PF/inversion improved significantly with Xtern AFO. Pt fluctuates between R knee hyperextension and excessive R knee flexion in mid-terminal stance depending upon attention and fatigue.  Strengths: Able to follow instructions, Alert and oriented, Effective communication skills, Good carryover of learning, Good endurance, Independent prior level of function  Barriers: Generalized weakness, Hemiparesis, Impaired balance    Plan    Litegait with mirror feedback, gait and transfer training NBQC, quadruped and tall kneel, prone on elbow combat crawl    Passport items to be completed:  Get in/out of bed safely, in/out of a vehicle, safely use mobility device, walk or wheel around home/community, navigate up and down stairs, show how to get up/down from the ground, ensure home is accessible, demonstrate HEP, complete caregiver training    Physical Therapy Problems (Active)       Problem: Mobility Transfers       Dates: Start: 02/07/23         Goal: STG-Within one week, patient will perform bed mobility independently       Dates: Start: 02/07/23               Problem: PT-Long Term Goals       Dates: Start: 02/07/23         Goal: LTG-By discharge, patient will propel wheelchair 150ft [place]       Dates: Start: 02/07/23            Goal: LTG-By discharge, patient will ambulate 150ft with LRAD and CGA       Dates: Start: 02/07/23            Goal: LTG-By discharge, patient will transfer one surface to another [place]       Dates: Start: 02/07/23            Goal: LTG-By discharge, patient will perform home exercise program independently       Dates: Start: 02/07/23            Goal: LTG-By discharge, patient will ambulate up/down 1 curb step with [place]       Dates: Start: 02/07/23

## 2023-02-16 NOTE — THERAPY
"Occupational Therapy  Daily Treatment     Patient Name: Anali Pan  Age:  57 y.o., Sex:  female  Medical Record #: [id number]  Today's Date: 2/16/2023     Precautions  Precautions: (P) Fall Risk  Comments: (P) R hemiplegia, absent R UE/LE light touch sensation         Subjective    Pt perseverated on feeling \"off\" and \"lethargic\" throughout session.     Objective       02/16/23 0831   OT Charge Group   OT Neuromuscular Re-education / Balance (Units) 2   OT Total Time Spent   OT Individual Total Time Spent (Mins) 30   Precautions   Precautions Fall Risk   Comments R hemiplegia, absent R UE/LE light touch sensation   Neuro-Muscular Treatments   Neuro-Muscular Treatments Facilitation;Joint Approximation;Postural Changes;Sensory Stimuli   Comments RUE AAROM/AROM/PROM see note for details   Interdisciplinary Plan of Care Collaboration   IDT Collaboration with  Physician;Physical Therapist   Patient Position at End of Therapy Seated;Tray Table within Reach;Call Light within Reach;Phone within Reach   Collaboration Comments Pt reports feeling \"off\"     RUE Neuro re-education  Pt had increased spacticity in biceps, triceps, wrist flexors, and wrist extensors when completing quick stretch. Pt's tone in bicep and triceps improved throughout session, but cont to be present when using RUE. Pt completed AAROM/AROM of shoulder flexion/extension/rotation, elbow flexion/extension, wrist flexion/extension and finger flexion/extension.     Pt completed reaching task to address isolated RUE movements. Pt reach for and stacked cones w/ min-mod A. Pt over compensates with R shoulder and requires physical assistance to isolate distal movements.     Assessment    Pt agreeable to OT session focused on RUE neuro reeducation. Pt was distracted throughout session due to feeling \"off\". Pt reported she felt like they changed her medication and it was effecting her ability to participate. Pt's RUE cont to have tone and spacticity and did not " show and decrease in function compared to previous sessions.   Strengths: Able to follow instructions, Alert and oriented, Effective communication skills, Good insight into deficits/needs, Independent prior level of function, Making steady progress towards goals, Manages pain appropriately, Motivated for self care and independence, Pleasant and cooperative, Supportive family, Willingly participates in therapeutic activities  Barriers: Decreased endurance, Impaired activity tolerance, Impaired balance, Limited mobility, Hemiparesis    Plan    RUE Neuro re-ed, Vision (double vision/tracking/acuity), functional cognition, ADLs, activity tolerance. monitor BP for hypertension. Pt is not always symptomatic.   3x/wk w/ tech    Occupational Therapy Goals (Active)       Problem: Bathing       Dates: Start: 02/15/23         Goal: STG-Within one week, patient will bathe w/ supervision.        Dates: Start: 02/15/23               Problem: Dressing       Dates: Start: 02/15/23         Goal: STG-Within one week, patient will retrieve clothing and dress w/ SBA.        Dates: Start: 02/15/23               Problem: Functional Transfers       Dates: Start: 02/15/23         Goal: STG-Within one week, patient will transfer to toilet w/ supervision.        Dates: Start: 02/15/23            Goal: STG-Within one week, patient will transfer to tub/shower       Dates: Start: 02/15/23       Description: W/ SBA to simulate home set up.            Problem: IADL's       Dates: Start: 02/15/23         Goal: STG-Within one week, patient will access kitchen area w/ min A using LRD.        Dates: Start: 02/15/23               Problem: OT Long Term Goals       Dates: Start: 02/07/23         Goal: LTG-By discharge, patient will complete basic self care tasks w/ mod I - min A.       Dates: Start: 02/07/23            Goal: LTG-By discharge, patient will perform bathroom transfers w/ mod I - min A.       Dates: Start: 02/07/23                Problem: Toileting       Dates: Start: 02/07/23         Goal: STG-Within one week, patient will complete toileting tasks w/ min.       Dates: Start: 02/07/23

## 2023-02-16 NOTE — CARE PLAN
The patient is Stable - Low risk of patient condition declining or worsening    Shift Goals  Clinical Goals: Safety  Patient Goals: saftey    Progress made toward(s) clinical / shift goals:    Problem: Bladder / Voiding  Goal: Patient will establish and maintain regular urinary output  Outcome: Progressing. Patient voiding well over shift. Calls appropriately with call light for transfers to bathroom.      Problem: Fall Risk - Rehab  Goal: Patient will remain free from falls  Outcome: Progressing. Patient bed in lowest locked position with bed alarm on and call light within reach. Patient calls appropriately for needs with call light.      Problem: Pain - Standard  Goal: Alleviation of pain or a reduction in pain to the patient’s comfort goal  Outcome: Progressing. Patient denies having any pain over shift. Has prn tylenol available.

## 2023-02-16 NOTE — THERAPY
Physical Therapy   Daily Treatment     Patient Name: Anali Pan  Age:  57 y.o., Sex:  female  Medical Record #: [id number]  Today's Date: 2/16/2023     Precautions  Precautions: (P) Fall Risk  Comments: (P) R hemiplegia, absent R UE/LE light touch sensation    Subjective    Patient seated in w/c with daughter present, agreeable to therapy however reporting malaise/feeling off due to increased Baclofen.     Objective       02/16/23 1031   PT Charge Group   PT Neuromuscular Re-Education / Balance (Units) 2   PT Therapeutic Activities (Units) 2   PT Total Time Spent   PT Individual Total Time Spent (Mins) 60   Precautions   Precautions Fall Risk   Comments R hemiplegia, absent R UE/LE light touch sensation   Vitals   Pulse 85   Patient BP Position Sitting   Blood Pressure (!) 151/74   Room Air Oximetry 97   O2 Delivery Device None - Room Air   Gait Functional Level of Assist    Gait Level Of Assist Minimal Assist   Assistive Device Quad Cane  (SBQC; R Extern AFO)   Distance (Feet) 10   # of Times Distance was Traveled 2   Deviation Ataxic;Decreased Base Of Support;Step To  (R sarahy gait with tendency for ankle PF/inv during swing, inconsistent hyperextension thrust vs. buckling in stance)   Transfer Functional Level of Assist   Bed, Chair, Wheelchair Transfer Contact Guard Assist   Bed Chair Wheelchair Transfer Description Adaptive equipment;Increased time;Initial preparation for task;Supervision for safety;Verbal cueing  (stand step transfer with SBQC vs. reach pivot at w/c level)   Bed Mobility    Supine to Sit Independent   Sit to Supine Independent   Sit to Stand Contact Guard Assist   Scooting Supervised   Rolling Supervised   Interdisciplinary Plan of Care Collaboration   IDT Collaboration with  Physical Therapist;Physician;Family / Caregiver   Patient Position at End of Therapy Seated;Self Releasing Lap Belt Applied;Family / Friend in Room  (daughter escorting patient back to room)   Collaboration Comments  CLOF; primary PT re: POC     Donning of R extern AFO with daughter observing, discussing home use.    RLE spasticity management: BLE bridges 1x15; BLE LTRs x10 each direction; RLE hip IR/ER 2x30 seconds. Noted improvement in ankle spasticity post-interventions.    Quadruped with air splint and assist at RUE to maintain hand placement and elbow extension, otherwise SBA with exception of Min A to attain position:  - A/P weight shifting 1x10   - Weight shifting circles in CW/CCW directions 1x10 each direction    Long sitting with lateral L elbow prop performing trunk rotation x3; RUE prop with trunk rotation x3 with facilitation of RUE extension.    Assessment    Patient tolerated session fairly, requiring modification of activity due to symptoms of increased Baclofen dose. Patient deferred ambulation to later session. Demonstrating multiplanar movements with increased time and compensations. Good response to spasticity treatment.    Strengths: Able to follow instructions, Alert and oriented, Effective communication skills, Good carryover of learning, Good endurance, Independent prior level of function  Barriers: Generalized weakness, Hemiparesis, Impaired balance    Plan    Litegait with mirror feedback, gait and transfer training NBQC, quadruped and tall kneel, prone on elbow combat crawl, Bioness     Passport items to be completed:  Get in/out of bed safely, in/out of a vehicle, safely use mobility device, walk or wheel around home/community, navigate up and down stairs, show how to get up/down from the ground, ensure home is accessible, demonstrate HEP, complete caregiver training    Physical Therapy Problems (Active)       Problem: Mobility Transfers       Dates: Start: 02/07/23         Goal: STG-Within one week, patient will perform bed mobility independently       Dates: Start: 02/07/23               Problem: PT-Long Term Goals       Dates: Start: 02/07/23         Goal: LTG-By discharge, patient will propel  wheelchair 150ft [place]       Dates: Start: 02/07/23            Goal: LTG-By discharge, patient will ambulate 150ft with LRAD and CGA       Dates: Start: 02/07/23            Goal: LTG-By discharge, patient will transfer one surface to another [place]       Dates: Start: 02/07/23            Goal: LTG-By discharge, patient will perform home exercise program independently       Dates: Start: 02/07/23            Goal: LTG-By discharge, patient will ambulate up/down 1 curb step with [place]       Dates: Start: 02/07/23

## 2023-02-16 NOTE — PROGRESS NOTES
"Rehab Progress Note     Date of Service: 2/16/2023  Chief Complaint: follow up stroke    Interval Events (Subjective)    Patient seen and examined today in her room.  She is feeling very \"off\" today.  She reports feeling lethargic, dizzy, and just generally unwell.  She experienced some nausea last night and this morning and was unable to eat breakfast.  She denies any abdominal pain.  She did move her bowels this morning.  Patient seen again in the therapy gym where she is being trialed with an AFO that goes over instead of inside the sneaker.  This does seem to assist with her spastic supination/inversion.    Patient has no other new questions, concerns, or complaints today.     ROS: No changes to bowel, bladder, pain, mood, or sleep.         Objective:  VITAL SIGNS: BP (!) 151/74   Pulse 85   Temp 36.7 °C (98 °F) (Oral)   Resp 18   Ht 1.676 m (5' 6\")   Wt 79.4 kg (175 lb)   LMP  (LMP Unknown)   SpO2 96%   BMI 28.25 kg/m²   Gen: alert, no apparent distress  CV: Regular rate, regular rhythm  Resp: Clear to auscultation bilaterally  Neuro: Right spastic hemiparesis      Recent Results (from the past 72 hour(s))   SODIUM SERUM (NA)    Collection Time: 02/16/23  5:38 AM   Result Value Ref Range    Sodium 138 135 - 145 mmol/L   CBC WITHOUT DIFFERENTIAL    Collection Time: 02/16/23  9:28 AM   Result Value Ref Range    WBC 6.3 4.8 - 10.8 K/uL    RBC 4.12 (L) 4.20 - 5.40 M/uL    Hemoglobin 13.1 12.0 - 16.0 g/dL    Hematocrit 37.5 37.0 - 47.0 %    MCV 91.0 81.4 - 97.8 fL    MCH 31.8 27.0 - 33.0 pg    MCHC 34.9 33.6 - 35.0 g/dL    RDW 39.9 35.9 - 50.0 fL    Platelet Count 401 164 - 446 K/uL    MPV 9.2 9.0 - 12.9 fL   URINALYSIS    Collection Time: 02/16/23 11:50 AM    Specimen: Urine, Clean Catch   Result Value Ref Range    Color Yellow     Character Clear     Specific Gravity 1.012 <1.035    Ph 6.5 5.0 - 8.0    Glucose Negative Negative mg/dL    Ketones Negative Negative mg/dL    Protein Negative Negative mg/dL    " Bilirubin Negative Negative    Urobilinogen, Urine 0.2 Negative    Nitrite Negative Negative    Leukocyte Esterase Small (A) Negative    Occult Blood Negative Negative    Micro Urine Req Microscopic    URINE MICROSCOPIC (W/UA)    Collection Time: 02/16/23 11:50 AM   Result Value Ref Range    WBC 10-20 (A) /hpf    RBC 0-2 /hpf    Bacteria Negative None /hpf    Epithelial Cells Few /hpf    Hyaline Cast 3-5 (A) /lpf       Scheduled Medications   Medication Dose Frequency    baclofen  5 mg TID    hydrALAZINE  75 mg Q8HRS    senna-docusate  2 Tablet BID    And    polyethylene glycol/lytes  1 Packet DAILY    losartan  100 mg Q DAY    atorvastatin  80 mg Q EVENING    amLODIPine  10 mg Q DAY    enoxaparin (LOVENOX) injection  40 mg DAILY AT 1800       Current Diet Order   Procedures    Diet Order Diet: Regular (Meds one at a time with liquid wash, larger pills in applesauce)       Assessment:    This patient is a 57 y.o. female admitted for acute inpatient rehabilitation with Hemorrhagic stroke (HCC).    I led and attended the weekly conference, and agree with the IDT conference documentation and plan of care as noted below.    Date of conference: 2/15/2023    Goals and barriers: See IDT note.    Biggest barriers: spasticity, right hemiparesis    CM/social support: Family to provide 24/7    Anticipated DC date: 3/7    Home health: PT/OT/RN    Equip: To be determined    Follow up: PCP, [facility], Dr. De La Torre      Problem List/Medical Decision Making and Plan:    Left basal hemorrhagic ganglia stroke  From uncontrolled hypertension  Right hemiparesis, improved  Right sarahy-anesthesia, improved  Cognitive impairment, continues  Dysphagia, resolved  Cognitive impairment, poor insight, continues  Impulsive, continues  Mild expressive aphasia/anomia, improved  Visual impairment, improved  Double vision, improved  Right neglect, continues  Continue therapies    Neuropathic pain, resolved  In the right arm  Was likely  secondary to spasticity, off gabapentin    Malaise  Without any other new neurological findings  Differential diagnosis includes infection, medication side effect  Check CBC and UA  Decreased dose of baclofen    Spasticity  Continue baclofen, decrease back down to lower dose    Hyperlipidemia    Continue statin    Hypertension, improved/resolved  Continue amlodipine, losartan, hydralazine  Titrated off clonidine    Alcohol use  Will need counseling  Neuro-psychologist consulted     PFO  Not cause of stroke  Outpatient follow up     Hyponatremia, resolved  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  Physical Medicine and Rehabilitation

## 2023-02-17 ENCOUNTER — APPOINTMENT (OUTPATIENT)
Dept: PHYSICAL THERAPY | Facility: REHABILITATION | Age: 58
DRG: 057 | End: 2023-02-17
Attending: PHYSICAL MEDICINE & REHABILITATION
Payer: COMMERCIAL

## 2023-02-17 ENCOUNTER — APPOINTMENT (OUTPATIENT)
Dept: OCCUPATIONAL THERAPY | Facility: REHABILITATION | Age: 58
DRG: 057 | End: 2023-02-17
Attending: PHYSICAL MEDICINE & REHABILITATION
Payer: COMMERCIAL

## 2023-02-17 PROCEDURE — 97113 AQUATIC THERAPY/EXERCISES: CPT

## 2023-02-17 PROCEDURE — 97530 THERAPEUTIC ACTIVITIES: CPT

## 2023-02-17 PROCEDURE — 97112 NEUROMUSCULAR REEDUCATION: CPT

## 2023-02-17 PROCEDURE — 97112 NEUROMUSCULAR REEDUCATION: CPT | Mod: CQ

## 2023-02-17 PROCEDURE — A9270 NON-COVERED ITEM OR SERVICE: HCPCS | Performed by: PHYSICAL MEDICINE & REHABILITATION

## 2023-02-17 PROCEDURE — 97535 SELF CARE MNGMENT TRAINING: CPT

## 2023-02-17 PROCEDURE — 97032 APPL MODALITY 1+ESTIM EA 15: CPT

## 2023-02-17 PROCEDURE — 770010 HCHG ROOM/CARE - REHAB SEMI PRIVAT*

## 2023-02-17 PROCEDURE — 97110 THERAPEUTIC EXERCISES: CPT | Mod: CQ

## 2023-02-17 PROCEDURE — 700102 HCHG RX REV CODE 250 W/ 637 OVERRIDE(OP): Performed by: PHYSICAL MEDICINE & REHABILITATION

## 2023-02-17 PROCEDURE — 700111 HCHG RX REV CODE 636 W/ 250 OVERRIDE (IP): Performed by: PHYSICAL MEDICINE & REHABILITATION

## 2023-02-17 PROCEDURE — 99232 SBSQ HOSP IP/OBS MODERATE 35: CPT | Performed by: PHYSICAL MEDICINE & REHABILITATION

## 2023-02-17 RX ADMIN — HYDRALAZINE HYDROCHLORIDE 75 MG: 25 TABLET, FILM COATED ORAL at 21:33

## 2023-02-17 RX ADMIN — ACETAMINOPHEN 650 MG: 325 TABLET ORAL at 02:29

## 2023-02-17 RX ADMIN — METOPROLOL TARTRATE 12.5 MG: 25 TABLET, FILM COATED ORAL at 18:06

## 2023-02-17 RX ADMIN — AMLODIPINE BESYLATE 10 MG: 5 TABLET ORAL at 06:10

## 2023-02-17 RX ADMIN — ACETAMINOPHEN 650 MG: 325 TABLET ORAL at 15:02

## 2023-02-17 RX ADMIN — BACLOFEN 5 MG: 10 TABLET ORAL at 08:24

## 2023-02-17 RX ADMIN — METOPROLOL TARTRATE 12.5 MG: 25 TABLET, FILM COATED ORAL at 10:00

## 2023-02-17 RX ADMIN — HYDRALAZINE HYDROCHLORIDE 75 MG: 25 TABLET, FILM COATED ORAL at 15:02

## 2023-02-17 RX ADMIN — ATORVASTATIN CALCIUM 80 MG: 40 TABLET, FILM COATED ORAL at 20:16

## 2023-02-17 RX ADMIN — HYDRALAZINE HYDROCHLORIDE 75 MG: 25 TABLET, FILM COATED ORAL at 06:10

## 2023-02-17 RX ADMIN — ALUMINUM HYDROXIDE, MAGNESIUM HYDROXIDE, AND DIMETHICONE 20 ML: 400; 400; 40 SUSPENSION ORAL at 20:18

## 2023-02-17 RX ADMIN — ENOXAPARIN SODIUM 40 MG: 40 INJECTION SUBCUTANEOUS at 18:06

## 2023-02-17 RX ADMIN — BACLOFEN 5 MG: 10 TABLET ORAL at 15:03

## 2023-02-17 RX ADMIN — BACLOFEN 5 MG: 10 TABLET ORAL at 20:16

## 2023-02-17 RX ADMIN — LOSARTAN POTASSIUM 100 MG: 25 TABLET, FILM COATED ORAL at 06:10

## 2023-02-17 RX ADMIN — ACETAMINOPHEN 650 MG: 325 TABLET ORAL at 08:24

## 2023-02-17 ASSESSMENT — PAIN DESCRIPTION - PAIN TYPE
TYPE: ACUTE PAIN
TYPE: ACUTE PAIN

## 2023-02-17 ASSESSMENT — GAIT ASSESSMENTS
DISTANCE (FEET): 60
GAIT LEVEL OF ASSIST: MINIMAL ASSIST
ASSISTIVE DEVICE: QUAD CANE

## 2023-02-17 ASSESSMENT — ACTIVITIES OF DAILY LIVING (ADL)
TOILET_TRANSFER_DESCRIPTION: GRAB BAR;INCREASED TIME;SUPERVISION FOR SAFETY
BED_CHAIR_WHEELCHAIR_TRANSFER_DESCRIPTION: INCREASED TIME;SET-UP OF EQUIPMENT;VERBAL CUEING;SUPERVISION FOR SAFETY

## 2023-02-17 NOTE — CARE PLAN
Problem: Knowledge Deficit - Standard  Goal: Patient and family/care givers will demonstrate understanding of plan of care, disease process/condition, diagnostic tests and medications  Outcome: Progressing   Patient verbalized understanding plan of care.         Problem: Pain - Standard  Goal: Alleviation of pain or a reduction in pain to the patient’s comfort goal  Outcome: Progressing   Patient is able to rate pain on a scale of 1-10.

## 2023-02-17 NOTE — PROGRESS NOTES
"Rehab Progress Note     Date of Service: 2/17/2023  Chief Complaint: follow up stroke    Interval Events (Subjective)    Patient seen and examined today in the therapy gym.  She is working on the leg press with PT.  She reports she feels much better today on the lower dose of baclofen.  She is reporting a mild headache 1 out of 10 in severity.  She was mildly hypertensive this morning.  She slept very well last night.  She is doing further pool therapy today.    Patient has no other new questions, concerns, or complaints today.     ROS: No changes to bowel, bladder, mood, or sleep.           Objective:  VITAL SIGNS: BP (P) 137/88   Pulse (P) 84   Temp 37.6 °C (99.6 °F) (Oral)   Resp 16   Ht 1.676 m (5' 6\")   Wt 79.4 kg (175 lb)   LMP  (LMP Unknown)   SpO2 95%   BMI 28.25 kg/m²   Gen: alert, no apparent distress  CV: Regular rate, regular rhythm, very mild trace edema in the right lower extremity  Resp: Clear to auscultation bilaterally  Neuro: Right spastic hemiparesis, right hemianesthesia      Recent Results (from the past 72 hour(s))   SODIUM SERUM (NA)    Collection Time: 02/16/23  5:38 AM   Result Value Ref Range    Sodium 138 135 - 145 mmol/L   CBC WITHOUT DIFFERENTIAL    Collection Time: 02/16/23  9:28 AM   Result Value Ref Range    WBC 6.3 4.8 - 10.8 K/uL    RBC 4.12 (L) 4.20 - 5.40 M/uL    Hemoglobin 13.1 12.0 - 16.0 g/dL    Hematocrit 37.5 37.0 - 47.0 %    MCV 91.0 81.4 - 97.8 fL    MCH 31.8 27.0 - 33.0 pg    MCHC 34.9 33.6 - 35.0 g/dL    RDW 39.9 35.9 - 50.0 fL    Platelet Count 401 164 - 446 K/uL    MPV 9.2 9.0 - 12.9 fL   URINALYSIS    Collection Time: 02/16/23 11:50 AM    Specimen: Urine, Clean Catch   Result Value Ref Range    Color Yellow     Character Clear     Specific Gravity 1.012 <1.035    Ph 6.5 5.0 - 8.0    Glucose Negative Negative mg/dL    Ketones Negative Negative mg/dL    Protein Negative Negative mg/dL    Bilirubin Negative Negative    Urobilinogen, Urine 0.2 Negative    Nitrite " Negative Negative    Leukocyte Esterase Small (A) Negative    Occult Blood Negative Negative    Micro Urine Req Microscopic    URINE MICROSCOPIC (W/UA)    Collection Time: 02/16/23 11:50 AM   Result Value Ref Range    WBC 10-20 (A) /hpf    RBC 0-2 /hpf    Bacteria Negative None /hpf    Epithelial Cells Few /hpf    Hyaline Cast 3-5 (A) /lpf       Scheduled Medications   Medication Dose Frequency    metoprolol tartrate  12.5 mg TWICE DAILY    baclofen  5 mg TID    hydrALAZINE  75 mg Q8HRS    senna-docusate  2 Tablet BID    And    polyethylene glycol/lytes  1 Packet DAILY    losartan  100 mg Q DAY    atorvastatin  80 mg Q EVENING    amLODIPine  10 mg Q DAY    enoxaparin (LOVENOX) injection  40 mg DAILY AT 1800       Current Diet Order   Procedures    Diet Order Diet: Regular (Meds one at a time with liquid wash, larger pills in applesauce)       Assessment:    This patient is a 57 y.o. female admitted for acute inpatient rehabilitation with Hemorrhagic stroke (HCC).    I led and attended the weekly conference, and agree with the IDT conference documentation and plan of care as noted below.    Date of conference: 2/15/2023    Goals and barriers: See IDT note.    Biggest barriers: spasticity, right hemiparesis    CM/social support: Family to provide 24/7    Anticipated DC date: 3/7    Home health: PT/OT/RN    Equip: To be determined    Follow up: PCP, [facility], Dr. De La Torre      Problem List/Medical Decision Making and Plan:    Left basal hemorrhagic ganglia stroke  From uncontrolled hypertension  Right hemiparesis, improved  Right sarahy-anesthesia, improved  Cognitive impairment, continues  Dysphagia, resolved  Cognitive impairment, poor insight, continues  Impulsive, continues  Mild expressive aphasia/anomia, improved  Visual impairment, improved  Double vision, improved  Right neglect, continues  Continue therapies    Neuropathic pain, resolved  In the right arm  Was likely secondary to spasticity, off  gabapentin    Malaise, resolved  Baclofen side effect, dose decreased    Positive urinalysis  With resolved malaise and altered mental status  Culture ordered    Spasticity  Continue baclofen    Hyperlipidemia    Continue statin    Hypertension, improved/resolved  Start metoprolol now that she is off clonidine  Continue amlodipine, losartan, and hydralazine    Alcohol use  Will need counseling  Neuro-psychologist consulted     PFO  Not cause of stroke  Outpatient follow up     Hyponatremia, resolved  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  Physical Medicine and Rehabilitation

## 2023-02-17 NOTE — THERAPY
Physical Therapy   Daily Treatment     Patient Name: Anali Pan  Age:  57 y.o., Sex:  female  Medical Record #: [id number]  Today's Date: 2/17/2023     Precautions  Precautions: Fall Risk  Comments: R hemiplegia, absent R UE/LE light touch sensation    Subjective    Pt reports she slept 13 hours last night     Objective       02/17/23 0931   PT Charge Group   PT Therapeutic Exercise (Units) 1   PT Neuromuscular Re-Education / Balance (Units) 1   Supervising Physical Therapist José Luis Ibarra DPT   PT Total Time Spent   PT Individual Total Time Spent (Mins) 30   Supine Lower Body Exercise   Other Exercises Shuttle for closed chain strengthening and forced use B LE with 5 bands for 3 x 10, SL squats L LE with 4 bands for 3 x 10 and R LE initially with 3 bands progressing to 4 bands after first set, 3 x 10. B LE squats with wobble board 5 bands for 3 x 10.   Neuro-Muscular Treatments   Neuro-Muscular Treatments Joint Approximation;Verbal Cuing;Facilitation   Comments shuttle activity, manual cues for maintainign hip in neutral alignment and verbal cues for eccentric control and to avoid TKE   Interdisciplinary Plan of Care Collaboration   IDT Collaboration with  Physician   Patient Position at End of Therapy Seated;Self Releasing Lap Belt Applied   Collaboration Comments observed pt during therapy     [place] SQPT wc <> shuttle vc for set up and sequencing    Assessment    The patient tolerated the session well with focus on progression of R LE coordination and motor control  Strengths: Able to follow instructions, Alert and oriented, Effective communication skills, Good carryover of learning, Good endurance, Independent prior level of function  Barriers: Generalized weakness, Hemiparesis, Impaired balance    Plan    Litegait treadmill with mirror feedback, gait and transfer training NBQC, quadruped and tall kneel, prone on elbow combat crawl, [employer]     Therapy tech program RLE  2-3x/week.     Passport items to be  completed:  Get in/out of bed safely, in/out of a vehicle, safely use mobility device, walk or wheel around home/community, navigate up and down stairs, show how to get up/down from the ground, ensure home is accessible, demonstrate HEP, complete caregiver training    Physical Therapy Problems (Active)       Problem: Mobility Transfers       Dates: Start: 02/07/23         Goal: STG-Within one week, patient will perform bed mobility independently       Dates: Start: 02/07/23               Problem: PT-Long Term Goals       Dates: Start: 02/07/23         Goal: LTG-By discharge, patient will propel wheelchair 150ft [place]       Dates: Start: 02/07/23            Goal: LTG-By discharge, patient will ambulate 150ft with LRAD and CGA       Dates: Start: 02/07/23            Goal: LTG-By discharge, patient will transfer one surface to another [place]       Dates: Start: 02/07/23            Goal: LTG-By discharge, patient will perform home exercise program independently       Dates: Start: 02/07/23            Goal: LTG-By discharge, patient will ambulate up/down 1 curb step with [place]       Dates: Start: 02/07/23

## 2023-02-17 NOTE — CARE PLAN
The patient is Stable - Low risk of patient condition declining or worsening    Shift Goals  Clinical Goals: Safety  Patient Goals: saftey    Progress made toward(s) clinical / shift goals:    Problem: Bladder / Voiding  Goal: Patient will establish and maintain regular urinary output  Outcome: Progressing. Patient continent of bladder over shift. Voiding well.      Problem: Pain - Standard  Goal: Alleviation of pain or a reduction in pain to the patient’s comfort goal  Outcome: Progressing. Patient states having a headache to left side of head, 3/10, received prn tylenol 650 mg for pain and stated adequate relief.

## 2023-02-17 NOTE — THERAPY
Physical Therapy   Daily Treatment     Patient Name: Anali Pan  Age:  57 y.o., Sex:  female  Medical Record #: [id number]  Today's Date: 2/17/2023     Precautions  Precautions: Fall Risk  Comments: R hemiplegia, absent R UE/LE light touch sensation    Subjective    Patient seated in w/c and agreeable to aquatic therapy.     Objective       02/17/23 1301   PT Charge Group   PT Aquatic Therapy  4   PT Total Time Spent   PT Individual Total Time Spent (Mins) 60   Precautions   Precautions Fall Risk   Comments R hemiplegia, absent R UE/LE light touch sensation   Interdisciplinary Plan of Care Collaboration   IDT Collaboration with  Occupational Therapist;Nursing;Therapy Tech   Patient Position at End of Therapy Seated  (with therapy tech)   Collaboration Comments assist for pool therapy     Entered/exited pool via stairs with CGA, step to pattern with RLE leading on descent, LLE on ascent. Required SBA for activities unless otherwise noted. Ace wrap used for dorsiflexion/eversion assist on RLE:   - Forward ambulation x1 lap no UE support   - Lateral side stepping x2 laps no UE support    - Retrowalking x2 laps no UE support   - Prone with BUE support on pool rail (assist for RUE) performing flutter kicking and breast stroke kick 2x1 minute each type   - Seated RLE hamstring/gastroc stretch while patient performing BUE extension/retraction with yellow pool paddles 1x15, as well as in standing 1x10   - Standing trunk rotations for hip IR/ER rhythmic rotation and balance 1x10 each direction   - Supine Bahd Ragaz floating technique for spasticity management x2 minutes    Assessment    Patient tolerated session well, enjoying aquatic therapy however limited by feeling chilled in water. Focus of session on ambulation, dynamic balance, and tone management.    Strengths: Able to follow instructions, Alert and oriented, Effective communication skills, Good carryover of learning, Good endurance, Independent prior level of  function  Barriers: Generalized weakness, Hemiparesis, Impaired balance    Plan    Litegait treadmill with mirror feedback, gait and transfer training NBQC, quadruped and tall kneel, prone on elbow combat crawl, Bioness     Therapy tech program RLE  2-3x/week.     Passport items to be completed:  Get in/out of bed safely, in/out of a vehicle, safely use mobility device, walk or wheel around home/community, navigate up and down stairs, show how to get up/down from the ground, ensure home is accessible, demonstrate HEP, complete caregiver training    Physical Therapy Problems (Active)       Problem: Mobility Transfers       Dates: Start: 02/07/23         Goal: STG-Within one week, patient will perform bed mobility independently       Dates: Start: 02/07/23               Problem: PT-Long Term Goals       Dates: Start: 02/07/23         Goal: LTG-By discharge, patient will propel wheelchair 150ft [place]       Dates: Start: 02/07/23            Goal: LTG-By discharge, patient will ambulate 150ft with LRAD and CGA       Dates: Start: 02/07/23            Goal: LTG-By discharge, patient will transfer one surface to another [place]       Dates: Start: 02/07/23            Goal: LTG-By discharge, patient will perform home exercise program independently       Dates: Start: 02/07/23            Goal: LTG-By discharge, patient will ambulate up/down 1 curb step with [place]       Dates: Start: 02/07/23

## 2023-02-17 NOTE — THERAPY
Occupational Therapy  Daily Treatment     Patient Name: Anali Pan  Age:  57 y.o., Sex:  female  Medical Record #: [id number]  Today's Date: 2/17/2023     Precautions  Precautions: (P) Fall Risk  Comments: R hemiplegia, absent R UE/LE light touch sensation         Subjective    Pt reported SOB and headache when laying supine on forearms.     Objective       02/17/23 0831   OT Charge Group   OT Self Care / ADL (Units) 2   OT Neuromuscular Re-education / Balance (Units) 2   OT Total Time Spent   OT Individual Total Time Spent (Mins) 60   Precautions   Precautions Fall Risk   Vitals   Pulse 84   Patient BP Position Supine   Blood Pressure 137/88   Functional Level of Assist   Grooming Modified Independent;Seated   Grooming Description Seated in wheelchair at sink   Lower Body Dressing Minimal Assist  (A to don R shoe/AFO and tie B shoes)   Toilet Transfers Standby Assist   Toilet Transfer Description Grab bar;Increased time;Supervision for safety   Balance   Sitting Balance (Static) Good   Sitting Balance (Dynamic) Good   Interdisciplinary Plan of Care Collaboration   IDT Collaboration with  Physical Therapist Assistant (PTA)   Patient Position at End of Therapy Seated  (in gym waiting for PT)   Collaboration Comments transition of care     Seated EOM pt completed weightbearing through R hand and R forearm to address proprioception and tone. Pt completed R lateral lean down to forearm 2 sets of 10 reps to focus on R elbow flexion/extension.     Pt completed RUE AAROM w/ quickie to address elbow flexion/extension, shoulder flexion/extension/rotation coordination. Pt completed 2 sets of 10 reps for push fwd/bwd and arcs R/L.     Pt attempted to get into quadruped on mat. Pt got into supine propped up on forearm for ~1 min. Pt reported SOB, sudden onset of headache, and sweating. Pt's BP was taken 5 min later while in supine and was 137/88.     Assessment    Pt demonstrated increased control w/ RUE during session. Pt  cont to need cues to use RUE during functional tasks. Pt had increased tone in fingers compared to yesterdays session. Pt unable to complete exercises in quadruped during session, but agreeable to try again later. Pt cont to be very motivated to increase RUE function and independence.   Strengths: Able to follow instructions, Alert and oriented, Effective communication skills, Good insight into deficits/needs, Independent prior level of function, Making steady progress towards goals, Manages pain appropriately, Motivated for self care and independence, Pleasant and cooperative, Supportive family, Willingly participates in therapeutic activities  Barriers: Decreased endurance, Impaired activity tolerance, Impaired balance, Limited mobility, Hemiparesis    Plan    RUE Neuro re-ed, functional cognition, ADLs, activity tolerance, standing balance monitor BP for hypertension. Pt is not always symptomatic.   3x/wk w/ tech    Occupational Therapy Goals (Active)       Problem: Bathing       Dates: Start: 02/15/23         Goal: STG-Within one week, patient will bathe w/ supervision.        Dates: Start: 02/15/23               Problem: Dressing       Dates: Start: 02/15/23         Goal: STG-Within one week, patient will retrieve clothing and dress w/ SBA.        Dates: Start: 02/15/23               Problem: Functional Transfers       Dates: Start: 02/15/23         Goal: STG-Within one week, patient will transfer to toilet w/ supervision.        Dates: Start: 02/15/23            Goal: STG-Within one week, patient will transfer to tub/shower       Dates: Start: 02/15/23       Description: W/ SBA to simulate home set up.            Problem: IADL's       Dates: Start: 02/15/23         Goal: STG-Within one week, patient will access kitchen area w/ min A using LRD.        Dates: Start: 02/15/23               Problem: OT Long Term Goals       Dates: Start: 02/07/23         Goal: LTG-By discharge, patient will complete basic  self care tasks w/ mod I - min A.       Dates: Start: 02/07/23            Goal: LTG-By discharge, patient will perform bathroom transfers w/ mod I - min A.       Dates: Start: 02/07/23               Problem: Toileting       Dates: Start: 02/07/23         Goal: STG-Within one week, patient will complete toileting tasks w/ min.       Dates: Start: 02/07/23

## 2023-02-17 NOTE — THERAPY
"Occupational Therapy  Daily Treatment     Patient Name: Anali Pan  Age:  57 y.o., Sex:  female  Medical Record #: [id number]  Today's Date: 2/17/2023     Precautions  Precautions: (P) Fall Risk  Comments: (P) R hemiplegia, absent R UE/LE light touch sensation         Subjective    Pt reported being \"freezing\" and feeling \"unwell\" throughout session     Objective       02/17/23 1431   OT Charge Group   OT Self Care / ADL (Units) 1   OT Therapy Activity (Units) 1   OT Total Time Spent   OT Individual Total Time Spent (Mins) 30   Precautions   Precautions Fall Risk   Comments R hemiplegia, absent R UE/LE light touch sensation   Vitals   Room Air Oximetry 98.6   Functional Level of Assist   Lower Body Dressing Supervision  (doff/don socks)   Lower Body Dressing Description   (one handed)   Bed, Chair, Wheelchair Transfer Minimal Assist   Bed Chair Wheelchair Transfer Description Increased time;Set-up of equipment;Verbal cueing;Supervision for safety  (reach pivot w/c>bed)   Fine Motor / Dexterity    Comments  Pt attempted to complete large peg board but reported being \"freezing\" and feeling \"unwell\". Pt unable to focus on task. Pt's RUE had increased difficulty w/ grasp, release, and gross motor compared to earlier session.   Interdisciplinary Plan of Care Collaboration   IDT Collaboration with  Nursing   Patient Position at End of Therapy In Bed;Phone within Reach;Tray Table within Reach;Call Light within Reach   Collaboration Comments transition of care         Assessment    Pt agreeable to OT session, but ultimately was feeling too \"unwell\" to complete FM activity. Pt was unable to focus on task due and requested to transfer to bed. Nursing was notified and pt's temperature was taken.   Strengths: Able to follow instructions, Alert and oriented, Effective communication skills, Good insight into deficits/needs, Independent prior level of function, Making steady progress towards goals, Manages pain appropriately, " Motivated for self care and independence, Pleasant and cooperative, Supportive family, Willingly participates in therapeutic activities  Barriers: Decreased endurance, Impaired activity tolerance, Impaired balance, Limited mobility, Hemiparesis    Plan    RUE Neuro re-ed, functional cognition, ADLs, activity tolerance, standing balance monitor BP for hypertension. Pt is not always symptomatic.   3x/wk w/ tech    Occupational Therapy Goals (Active)       Problem: Bathing       Dates: Start: 02/15/23         Goal: STG-Within one week, patient will bathe w/ supervision.        Dates: Start: 02/15/23               Problem: Dressing       Dates: Start: 02/15/23         Goal: STG-Within one week, patient will retrieve clothing and dress w/ SBA.        Dates: Start: 02/15/23               Problem: Functional Transfers       Dates: Start: 02/15/23         Goal: STG-Within one week, patient will transfer to toilet w/ supervision.        Dates: Start: 02/15/23            Goal: STG-Within one week, patient will transfer to tub/shower       Dates: Start: 02/15/23       Description: W/ SBA to simulate home set up.            Problem: IADL's       Dates: Start: 02/15/23         Goal: STG-Within one week, patient will access kitchen area w/ min A using LRD.        Dates: Start: 02/15/23               Problem: OT Long Term Goals       Dates: Start: 02/07/23         Goal: LTG-By discharge, patient will complete basic self care tasks w/ mod I - min A.       Dates: Start: 02/07/23            Goal: LTG-By discharge, patient will perform bathroom transfers w/ mod I - min A.       Dates: Start: 02/07/23               Problem: Toileting       Dates: Start: 02/07/23         Goal: STG-Within one week, patient will complete toileting tasks w/ min.       Dates: Start: 02/07/23

## 2023-02-17 NOTE — THERAPY
Physical Therapy   Daily Treatment     Patient Name: Anali Pan  Age:  57 y.o., Sex:  female  Medical Record #: [id number]  Today's Date: 2/17/2023     Precautions  Precautions: (P) Fall Risk  Comments: (P) R hemiplegia, absent R UE/LE light touch sensation    Subjective    Patient seated in w/c and agreeable to therapy, requesting to use bathroom.     Objective       02/17/23 1031   PT Charge Group   PT Electrical Stimulation Attended (Units) 3   PT Therapeutic Activities (Units) 1   PT Total Time Spent   PT Individual Total Time Spent (Mins) 60   Precautions   Precautions Fall Risk   Comments R hemiplegia, absent R UE/LE light touch sensation   Vitals   Pulse 72   Patient BP Position Sitting   Blood Pressure (!) 150/90   O2 Delivery Device None - Room Air   Gait Functional Level of Assist    Gait Level Of Assist Minimal Assist   Assistive Device Quad Cane  (SBQC, R extern AFO)   Distance (Feet) 60   # of Times Distance was Traveled 1   Deviation Decreased Base Of Support;Step To;Decreased Toe Off  (R sarahy gait with decreased terminal knee extension in stance, tendency for flexor synergy.)   Bed Mobility    Sit to Stand Contact Guard Assist   Interdisciplinary Plan of Care Collaboration   IDT Collaboration with  Occupational Therapist   Patient Position at End of Therapy Seated;Self Releasing Lap Belt Applied;Call Light within Reach;Tray Table within Reach;Phone within Reach   Collaboration Comments CLOF, POC     Toileting at w/c level with grab bar and CGA, intermittent standing balance with UE support on grab bar vs. No UE support and SBA. LB dressing in sitting and standing with grab bar to don pants better suited for .    Initial assessment and set up of RLE ; patient educated on process, expectations, and purpose. Stimulation parameters set for RLE glutes, quads, hamstrings, tib anterior, and gastrocs; see patient profile for details.   Distance: 1.64 miles   Average Power: 1.9 W   Average  Asymmetry: Right 2%   Time: total 14:06 minutes; time active 12:23 minutes      Assessment    Patient tolerated session well, receptive to trialing  for RLE. Unable to tolerate sufficient stimulation amplitude to elicit isolated muscle contraction, however demonstrated significant improvement in flexor tone with ambulation post-intervention. Will continue to benefit from additional sessions and increasing amplitude to patient tolerance.    Strengths: Able to follow instructions, Alert and oriented, Effective communication skills, Good carryover of learning, Good endurance, Independent prior level of function  Barriers: Generalized weakness, Hemiparesis, Impaired balance    Plan    Litegait treadmill with mirror feedback, gait and transfer training NBQC, quadruped and tall kneel, prone on elbow combat crawl, [employer]    Therapy tech program RLE  2-3x/week.     Passport items to be completed:  Get in/out of bed safely, in/out of a vehicle, safely use mobility device, walk or wheel around home/community, navigate up and down stairs, show how to get up/down from the ground, ensure home is accessible, demonstrate HEP, complete caregiver training    Physical Therapy Problems (Active)       Problem: Mobility Transfers       Dates: Start: 02/07/23         Goal: STG-Within one week, patient will perform bed mobility independently       Dates: Start: 02/07/23               Problem: PT-Long Term Goals       Dates: Start: 02/07/23         Goal: LTG-By discharge, patient will propel wheelchair 150ft [place]       Dates: Start: 02/07/23            Goal: LTG-By discharge, patient will ambulate 150ft with LRAD and CGA       Dates: Start: 02/07/23            Goal: LTG-By discharge, patient will transfer one surface to another [place]       Dates: Start: 02/07/23            Goal: LTG-By discharge, patient will perform home exercise program independently       Dates: Start: 02/07/23            Goal: LTG-By discharge, patient will  ambulate up/down 1 curb step with [place]       Dates: Start: 02/07/23

## 2023-02-17 NOTE — THERAPY
"Physical Therapy   Daily Treatment     Patient Name: Anali Pan  Age:  57 y.o., Sex:  female  Medical Record #: [id number]  Today's Date: 2/16/2023     Precautions  Precautions: Fall Risk  Comments: R hemiplegia, absent R UE/LE light touch sensation    Subjective    Pt reported difficulty sleeping last night and increased fatigue today. She believes this is due to increase in baclofen dosage but acknowledges that she completed more activity overall yesterday. Pt still confusing gabapentin and baclofen when asked about her symptoms, \"I was drugged up on that sheila stuff\"     Objective       02/16/23 1401   PT Charge Group   PT Gait Training (Units) 1   PT Therapeutic Activities (Units) 1   PT Total Time Spent   PT Individual Total Time Spent (Mins) 30   Gait Functional Level of Assist    Gait Level Of Assist Minimal Assist   Assistive Device Quad Cane   Distance (Feet) 40   # of Times Distance was Traveled 2   Deviation Antalgic;Decreased Base Of Support;Step To  (3pt gait progressed to 2 pt with max verbal cueing, decreased R knee hyperextension than previously observed)   Bed Mobility    Sit to Stand Contact Guard Assist   Interdisciplinary Plan of Care Collaboration   IDT Collaboration with  Occupational Therapist;Physical Therapist   Patient Position at End of Therapy Seated;Call Light within Reach;Tray Table within Reach;Phone within Reach   Collaboration Comments pt reporting adverse side effects due to baclofen dosage increase, poor sleep, and overall having difficult day     Adjusted Xtern AFO for improved fit and comfort. Session focused on gait training NBQC and education regarding POC, overall pt progress, and side effects/benefits of medications    Assessment    Pt demonstrated decreased R knee hyperextension in stance phase of gait today. She is inconsistent with step to/step through pattern, particularly when challenged to progress from 3 point to 2 point gait pattern. Despite feeling more fatigued " today, pt is making good progress toward her goals.  Strengths: Able to follow instructions, Alert and oriented, Effective communication skills, Good carryover of learning, Good endurance, Independent prior level of function  Barriers: Generalized weakness, Hemiparesis, Impaired balance    Plan    Litegait treadmill with mirror feedback, gait and transfer training NBQC, quadruped and tall kneel, prone on elbow combat crawl, Bioness    Passport items to be completed:  Get in/out of bed safely, in/out of a vehicle, safely use mobility device, walk or wheel around home/community, navigate up and down stairs, show how to get up/down from the ground, ensure home is accessible, demonstrate HEP, complete caregiver training    Physical Therapy Problems (Active)       Problem: Mobility Transfers       Dates: Start: 02/07/23         Goal: STG-Within one week, patient will perform bed mobility independently       Dates: Start: 02/07/23               Problem: PT-Long Term Goals       Dates: Start: 02/07/23         Goal: LTG-By discharge, patient will propel wheelchair 150ft [place]       Dates: Start: 02/07/23            Goal: LTG-By discharge, patient will ambulate 150ft with LRAD and CGA       Dates: Start: 02/07/23            Goal: LTG-By discharge, patient will transfer one surface to another [place]       Dates: Start: 02/07/23            Goal: LTG-By discharge, patient will perform home exercise program independently       Dates: Start: 02/07/23            Goal: LTG-By discharge, patient will ambulate up/down 1 curb step with [place]       Dates: Start: 02/07/23

## 2023-02-18 LAB
BACTERIA UR CULT: NORMAL
SIGNIFICANT IND 70042: NORMAL
SITE SITE: NORMAL
SOURCE SOURCE: NORMAL

## 2023-02-18 PROCEDURE — 700111 HCHG RX REV CODE 636 W/ 250 OVERRIDE (IP): Performed by: PHYSICAL MEDICINE & REHABILITATION

## 2023-02-18 PROCEDURE — 770010 HCHG ROOM/CARE - REHAB SEMI PRIVAT*

## 2023-02-18 PROCEDURE — A9270 NON-COVERED ITEM OR SERVICE: HCPCS | Performed by: PHYSICAL MEDICINE & REHABILITATION

## 2023-02-18 PROCEDURE — 700102 HCHG RX REV CODE 250 W/ 637 OVERRIDE(OP): Performed by: PHYSICAL MEDICINE & REHABILITATION

## 2023-02-18 RX ADMIN — BACLOFEN 5 MG: 10 TABLET ORAL at 14:53

## 2023-02-18 RX ADMIN — POLYETHYLENE GLYCOL 3350 1 PACKET: 17 POWDER, FOR SOLUTION ORAL at 09:06

## 2023-02-18 RX ADMIN — HYDRALAZINE HYDROCHLORIDE 75 MG: 25 TABLET, FILM COATED ORAL at 14:53

## 2023-02-18 RX ADMIN — BACLOFEN 5 MG: 10 TABLET ORAL at 09:05

## 2023-02-18 RX ADMIN — SENNOSIDES AND DOCUSATE SODIUM 2 TABLET: 50; 8.6 TABLET ORAL at 09:05

## 2023-02-18 RX ADMIN — METOPROLOL TARTRATE 12.5 MG: 25 TABLET, FILM COATED ORAL at 05:54

## 2023-02-18 RX ADMIN — HYDRALAZINE HYDROCHLORIDE 75 MG: 25 TABLET, FILM COATED ORAL at 05:54

## 2023-02-18 RX ADMIN — HYDRALAZINE HYDROCHLORIDE 75 MG: 25 TABLET, FILM COATED ORAL at 21:20

## 2023-02-18 RX ADMIN — ACETAMINOPHEN 650 MG: 325 TABLET ORAL at 11:56

## 2023-02-18 RX ADMIN — ATORVASTATIN CALCIUM 80 MG: 40 TABLET, FILM COATED ORAL at 20:20

## 2023-02-18 RX ADMIN — AMLODIPINE BESYLATE 10 MG: 5 TABLET ORAL at 05:53

## 2023-02-18 RX ADMIN — ENOXAPARIN SODIUM 40 MG: 40 INJECTION SUBCUTANEOUS at 17:11

## 2023-02-18 RX ADMIN — METOPROLOL TARTRATE 25 MG: 25 TABLET, FILM COATED ORAL at 17:11

## 2023-02-18 RX ADMIN — SENNOSIDES AND DOCUSATE SODIUM 2 TABLET: 50; 8.6 TABLET ORAL at 21:20

## 2023-02-18 RX ADMIN — BACLOFEN 5 MG: 10 TABLET ORAL at 20:20

## 2023-02-18 RX ADMIN — LOSARTAN POTASSIUM 100 MG: 25 TABLET, FILM COATED ORAL at 05:53

## 2023-02-18 ASSESSMENT — PAIN DESCRIPTION - PAIN TYPE: TYPE: ACUTE PAIN

## 2023-02-18 ASSESSMENT — PATIENT HEALTH QUESTIONNAIRE - PHQ9
1. LITTLE INTEREST OR PLEASURE IN DOING THINGS: NOT AT ALL
2. FEELING DOWN, DEPRESSED, IRRITABLE, OR HOPELESS: NOT AT ALL
1. LITTLE INTEREST OR PLEASURE IN DOING THINGS: NOT AT ALL
SUM OF ALL RESPONSES TO PHQ9 QUESTIONS 1 AND 2: 0
SUM OF ALL RESPONSES TO PHQ9 QUESTIONS 1 AND 2: 0
2. FEELING DOWN, DEPRESSED, IRRITABLE, OR HOPELESS: NOT AT ALL

## 2023-02-18 NOTE — PROGRESS NOTES
Pt c/o feeling tired today. Temp 100.8 and flushed. Tylenol 650mg given-see MAR.  Resting throughout shift.

## 2023-02-18 NOTE — CARE PLAN
The patient is Stable - Low risk of patient condition declining or worsening    Shift Goals  Clinical Goals: Safety  Patient Goals: Safety    Problem: Skin Integrity  Goal: Patient's skin integrity will be maintained or improve  Outcome: Progressing  Note:   Raciel Score: 19    Patient's skin remains intact and free from new or accidental injury this shift; no s/s of infection. RN wound protocol checked. Encouraged hydration and educated about the importance of nutrition to keep skin integrity. Will continue to monitor.       Problem: Fall Risk - Rehab  Goal: Patient will remain free from falls  Outcome: Progressing  Note: Karime Olivarez Fall risk Assessment Score: 8    Low fall risk interventions   - Call light within reach   - Yellow  socks   - Belongings within reach   - Bed in the lowest position

## 2023-02-19 PROCEDURE — 97116 GAIT TRAINING THERAPY: CPT

## 2023-02-19 PROCEDURE — 700111 HCHG RX REV CODE 636 W/ 250 OVERRIDE (IP): Performed by: PHYSICAL MEDICINE & REHABILITATION

## 2023-02-19 PROCEDURE — A9270 NON-COVERED ITEM OR SERVICE: HCPCS | Performed by: PHYSICAL MEDICINE & REHABILITATION

## 2023-02-19 PROCEDURE — 700102 HCHG RX REV CODE 250 W/ 637 OVERRIDE(OP): Performed by: PHYSICAL MEDICINE & REHABILITATION

## 2023-02-19 PROCEDURE — 770010 HCHG ROOM/CARE - REHAB SEMI PRIVAT*

## 2023-02-19 PROCEDURE — 97530 THERAPEUTIC ACTIVITIES: CPT

## 2023-02-19 RX ADMIN — ATORVASTATIN CALCIUM 80 MG: 40 TABLET, FILM COATED ORAL at 20:47

## 2023-02-19 RX ADMIN — METOPROLOL TARTRATE 25 MG: 25 TABLET, FILM COATED ORAL at 17:45

## 2023-02-19 RX ADMIN — HYDRALAZINE HYDROCHLORIDE 75 MG: 25 TABLET, FILM COATED ORAL at 20:47

## 2023-02-19 RX ADMIN — BACLOFEN 5 MG: 10 TABLET ORAL at 08:14

## 2023-02-19 RX ADMIN — POLYETHYLENE GLYCOL 3350 1 PACKET: 17 POWDER, FOR SOLUTION ORAL at 08:14

## 2023-02-19 RX ADMIN — METOPROLOL TARTRATE 25 MG: 25 TABLET, FILM COATED ORAL at 05:53

## 2023-02-19 RX ADMIN — HYDRALAZINE HYDROCHLORIDE 75 MG: 25 TABLET, FILM COATED ORAL at 14:30

## 2023-02-19 RX ADMIN — HYDRALAZINE HYDROCHLORIDE 75 MG: 25 TABLET, FILM COATED ORAL at 05:52

## 2023-02-19 RX ADMIN — ACETAMINOPHEN 650 MG: 325 TABLET ORAL at 08:14

## 2023-02-19 RX ADMIN — BACLOFEN 5 MG: 10 TABLET ORAL at 20:47

## 2023-02-19 RX ADMIN — LOSARTAN POTASSIUM 100 MG: 25 TABLET, FILM COATED ORAL at 05:52

## 2023-02-19 RX ADMIN — SENNOSIDES AND DOCUSATE SODIUM 2 TABLET: 50; 8.6 TABLET ORAL at 08:14

## 2023-02-19 RX ADMIN — ENOXAPARIN SODIUM 40 MG: 40 INJECTION SUBCUTANEOUS at 17:44

## 2023-02-19 RX ADMIN — BACLOFEN 5 MG: 10 TABLET ORAL at 14:30

## 2023-02-19 RX ADMIN — AMLODIPINE BESYLATE 10 MG: 5 TABLET ORAL at 05:52

## 2023-02-19 ASSESSMENT — GAIT ASSESSMENTS
DEVIATION: STEP TO
DISTANCE (FEET): 80
GAIT LEVEL OF ASSIST: CONTACT GUARD ASSIST
ASSISTIVE DEVICE: QUAD CANE

## 2023-02-19 ASSESSMENT — PAIN DESCRIPTION - PAIN TYPE
TYPE: ACUTE PAIN
TYPE: ACUTE PAIN

## 2023-02-19 NOTE — CARE PLAN
The patient is Stable - Low risk of patient condition declining or worsening    Shift Goals  Clinical Goals: safety  Patient Goals: safety    Problem: Skin Integrity  Goal: Patient's skin integrity will be maintained or improve  Outcome: Progressing  Note:   Raciel Score: 19    Patient's skin remains intact and free from new or accidental injury this shift; no s/s of infection. RN wound protocol checked. Encouraged hydration and educated about the importance of nutrition to keep skin integrity. Will continue to monitor.       Problem: Fall Risk - Rehab  Goal: Patient will remain free from falls  Outcome: Progressing  Note: Karime Olivarez Fall risk Assessment Score: 9      Low fall risk interventions   - Call light within reach   - Yellow  socks   - Belongings within reach   - Bed in the lowest position

## 2023-02-19 NOTE — CARE PLAN
The patient is Stable - Low risk of patient condition declining or worsening    Shift Goals  Clinical Goals: safety  Patient Goals: safety    Progress made toward(s) clinical / shift goals:  Pt remained safe throughout shift.  She demonstrates all the safety interventions she has been taught and we continue to remind pt.      Patient is not progressing towards the following goals:None

## 2023-02-19 NOTE — CARE PLAN
Problem: Self Care  Goal: Patient will have the ability to perform ADLs independently or with assistance  Outcome: Progressing  Note: Patient able to perform regular activities this shift.  Pain controlled this shift.  Pain management includes PRN pain meds as well as non-pharmacological measures such as emotional support, rest, and repositioning.  Will continue to monitor.    The patient is Stable - Low risk of patient condition declining or worsening    Shift Goals  Clinical Goals: safety  Patient Goals: safety    Progress made toward(s) clinical / shift goals:  pt participates with therapy and cooperates with nursing    Patient is not progressing towards the following goals:

## 2023-02-20 ENCOUNTER — APPOINTMENT (OUTPATIENT)
Dept: OCCUPATIONAL THERAPY | Facility: REHABILITATION | Age: 58
DRG: 057 | End: 2023-02-20
Attending: PHYSICAL MEDICINE & REHABILITATION
Payer: COMMERCIAL

## 2023-02-20 ENCOUNTER — APPOINTMENT (OUTPATIENT)
Dept: PHYSICAL THERAPY | Facility: REHABILITATION | Age: 58
DRG: 057 | End: 2023-02-20
Attending: PHYSICAL MEDICINE & REHABILITATION
Payer: COMMERCIAL

## 2023-02-20 PROCEDURE — 97112 NEUROMUSCULAR REEDUCATION: CPT

## 2023-02-20 PROCEDURE — 700111 HCHG RX REV CODE 636 W/ 250 OVERRIDE (IP): Performed by: PHYSICAL MEDICINE & REHABILITATION

## 2023-02-20 PROCEDURE — 97530 THERAPEUTIC ACTIVITIES: CPT

## 2023-02-20 PROCEDURE — A9270 NON-COVERED ITEM OR SERVICE: HCPCS | Performed by: PHYSICAL MEDICINE & REHABILITATION

## 2023-02-20 PROCEDURE — 99232 SBSQ HOSP IP/OBS MODERATE 35: CPT | Performed by: PHYSICAL MEDICINE & REHABILITATION

## 2023-02-20 PROCEDURE — 97116 GAIT TRAINING THERAPY: CPT

## 2023-02-20 PROCEDURE — 770010 HCHG ROOM/CARE - REHAB SEMI PRIVAT*

## 2023-02-20 PROCEDURE — 700102 HCHG RX REV CODE 250 W/ 637 OVERRIDE(OP): Performed by: PHYSICAL MEDICINE & REHABILITATION

## 2023-02-20 RX ADMIN — ENOXAPARIN SODIUM 40 MG: 40 INJECTION SUBCUTANEOUS at 18:19

## 2023-02-20 RX ADMIN — BACLOFEN 5 MG: 10 TABLET ORAL at 10:16

## 2023-02-20 RX ADMIN — HYDRALAZINE HYDROCHLORIDE 75 MG: 25 TABLET, FILM COATED ORAL at 05:40

## 2023-02-20 RX ADMIN — METOPROLOL TARTRATE 25 MG: 25 TABLET, FILM COATED ORAL at 18:20

## 2023-02-20 RX ADMIN — HYDRALAZINE HYDROCHLORIDE 75 MG: 25 TABLET, FILM COATED ORAL at 21:39

## 2023-02-20 RX ADMIN — BACLOFEN 5 MG: 10 TABLET ORAL at 16:43

## 2023-02-20 RX ADMIN — HYDRALAZINE HYDROCHLORIDE 75 MG: 25 TABLET, FILM COATED ORAL at 16:42

## 2023-02-20 RX ADMIN — METOPROLOL TARTRATE 25 MG: 25 TABLET, FILM COATED ORAL at 05:40

## 2023-02-20 RX ADMIN — ATORVASTATIN CALCIUM 80 MG: 40 TABLET, FILM COATED ORAL at 21:33

## 2023-02-20 RX ADMIN — BACLOFEN 5 MG: 10 TABLET ORAL at 21:34

## 2023-02-20 RX ADMIN — AMLODIPINE BESYLATE 10 MG: 5 TABLET ORAL at 05:40

## 2023-02-20 RX ADMIN — ACETAMINOPHEN 650 MG: 325 TABLET ORAL at 10:20

## 2023-02-20 RX ADMIN — LOSARTAN POTASSIUM 100 MG: 25 TABLET, FILM COATED ORAL at 05:40

## 2023-02-20 ASSESSMENT — GAIT ASSESSMENTS
DEVIATION: DECREASED BASE OF SUPPORT;BRADYKINETIC
ASSISTIVE DEVICE: QUAD CANE
DISTANCE (FEET): 150
GAIT LEVEL OF ASSIST: CONTACT GUARD ASSIST

## 2023-02-20 ASSESSMENT — ACTIVITIES OF DAILY LIVING (ADL): BED_CHAIR_WHEELCHAIR_TRANSFER_DESCRIPTION: SQUAT PIVOT TRANSFER TO WHEELCHAIR;VERBAL CUEING

## 2023-02-20 ASSESSMENT — PAIN DESCRIPTION - PAIN TYPE: TYPE: ACUTE PAIN

## 2023-02-20 NOTE — CARE PLAN
Problem: Skin Integrity  Goal: Patient's skin integrity will be maintained or improve  Outcome: Progressing  Note: Patient's skin remains intact and free from new or accidental injury this shift.  Will continue to monitor.    The patient is Stable - Low risk of patient condition declining or worsening    Shift Goals  Clinical Goals: safety  Patient Goals: safety    Progress made toward(s) clinical / shift goals:  pt skin intact, no s/s breakdown    Patient is not progressing towards the following goals:

## 2023-02-20 NOTE — THERAPY
Occupational Therapy  Daily Treatment     Patient Name: Anali Pan  Age:  57 y.o., Sex:  female  Medical Record #: [id number]  Today's Date: 2/20/2023     Precautions  Precautions: (P) Fall Risk  Comments: (P) R hemiplegia, absent R UE/LE light touch sensation         Subjective    Pt agreeable to OT session     Objective       02/20/23 1001   OT Charge Group   OT Neuromuscular Re-education / Balance (Units) 2   OT Therapy Activity (Units) 2   OT Total Time Spent   OT Individual Total Time Spent (Mins) 60   Precautions   Precautions Fall Risk   Comments R hemiplegia, absent R UE/LE light touch sensation   Fine Motor / Dexterity    Fine Motor / Dexterity Yes   Fine Motor / Dexterity Interventions Pt completed grasp and release activity w/ RUE while seated at table. Pt collected blocks, balls, and washclothes from table and placed them into bucket to address functional grasp.   Neuro-Muscular Treatments   Neuro-Muscular Treatments Joint Approximation;Postural Changes;Postural Facilitation;Quick Stretch;Tactile Cuing;Sequencing   Comments See note for details   Interdisciplinary Plan of Care Collaboration   IDT Collaboration with  Physical Therapist   Patient Position at End of Therapy Seated;Phone within Reach;Tray Table within Reach;Call Light within Reach   Collaboration Comments CLOF/POC       Pt propelled w/c through hallways and gym w/ mod I.     Pt used therapy ball for RUE joint approximation  to address proprioception and tone. Pt cont to have increased tone and spacticity in biceps, wrist flexors, and fingers.     While seated in front of mirror pt completed AAROM of RUE to address functional movement and neuromuscular re-education. W/ RUE and min A pt completed PNF patterns D1 and D2, shoulder press, shoulder rotation, and elbow flexion/extension. Pt required verbal cues to not over compensate with shoulder and trunk while moving RUE.     Pt was educated on neuromuscular re-education and principals of  neuro plasticity. Pt would benefit from cont education and handouts on neuromuscular re-education.     Assessment    Pt cont to demonstrate increased functional movement with RUE. Pt required hand over hand and fading verbal cues for reaching and grasp tasks. Pt was able to increase efficiency with grasp and release after repetition. Pt was educated to cont to work on grasp/release and reaching outside of therapy sessions.   Strengths: Able to follow instructions, Alert and oriented, Effective communication skills, Good insight into deficits/needs, Independent prior level of function, Making steady progress towards goals, Manages pain appropriately, Motivated for self care and independence, Pleasant and cooperative, Supportive family, Willingly participates in therapeutic activities  Barriers: Decreased endurance, Impaired activity tolerance, Impaired balance, Limited mobility, Hemiparesis    Plan    RUE Neuro re-ed, functional cognition, ADLs, activity tolerance, standing balance monitor BP for hypertension. Pt is not always symptomatic.   3x/wk w/ tech       Occupational Therapy Goals (Active)       Problem: Bathing       Dates: Start: 02/15/23         Goal: STG-Within one week, patient will bathe w/ supervision.        Dates: Start: 02/15/23               Problem: Dressing       Dates: Start: 02/15/23         Goal: STG-Within one week, patient will retrieve clothing and dress w/ SBA.        Dates: Start: 02/15/23               Problem: Functional Transfers       Dates: Start: 02/15/23         Goal: STG-Within one week, patient will transfer to toilet w/ supervision.        Dates: Start: 02/15/23            Goal: STG-Within one week, patient will transfer to tub/shower       Dates: Start: 02/15/23       Description: W/ SBA to simulate home set up.            Problem: IADL's       Dates: Start: 02/15/23         Goal: STG-Within one week, patient will access kitchen area w/ min A using LRD.        Dates: Start:  02/15/23               Problem: OT Long Term Goals       Dates: Start: 02/07/23         Goal: LTG-By discharge, patient will complete basic self care tasks w/ mod I - min A.       Dates: Start: 02/07/23            Goal: LTG-By discharge, patient will perform bathroom transfers w/ mod I - min A.       Dates: Start: 02/07/23               Problem: Toileting       Dates: Start: 02/07/23         Goal: STG-Within one week, patient will complete toileting tasks w/ min.       Dates: Start: 02/07/23

## 2023-02-20 NOTE — THERAPY
"Physical Therapy   Daily Treatment     Patient Name: Anali Pan  Age:  57 y.o., Sex:  female  Medical Record #: [id number]  Today's Date: 2/19/2023     Precautions  Precautions: Fall Risk  Comments: R hemiplegia, absent R UE/LE light touch sensation    Subjective    Patient asking to work on walking; reports leg \"heaviness/numbness\" with WB activities     Objective       02/19/23 1431   PT Charge Group   PT Gait Training (Units) 3   PT Therapeutic Activities (Units) 1   PT Total Time Spent   PT Individual Total Time Spent (Mins) 60   Gait Functional Level of Assist    Gait Level Of Assist Contact Guard Assist   Assistive Device Quad Cane   Distance (Feet) 80   # of Times Distance was Traveled 5   Deviation Step To  (3 point pattern; cues to slow down, step-to patterning)   Interdisciplinary Plan of Care Collaboration   IDT Collaboration with  Family / Caregiver   Collaboration Comments encouraged patient throughout session, provided functional distractions during activities     Requires max assist to don right shoe with external AFO    Assessment    CGA for ambulation with verbal cues to reset feet when take occasional misstep, then resume 3 point pattern with step-to in short bouts; patient demonstrated 2 minor LOB she was able to hear foot (not able to identify proprioceptively) and correct with close CGA    Strengths: Able to follow instructions, Alert and oriented, Effective communication skills, Good carryover of learning, Good endurance, Independent prior level of function  Barriers: Generalized weakness, Hemiparesis, Impaired balance    Plan    Litegait treadmill with mirror feedback, gait and transfer training NBQC, quadruped and tall kneel, prone on elbow combat crawl, Bioness     Therapy tech program RLE  2-3x/week.     Passport items to be completed:  Get in/out of bed safely, in/out of a vehicle, safely use mobility device, walk or wheel around home/community, navigate up and down stairs, show " how to get up/down from the ground, ensure home is accessible, demonstrate HEP, complete caregiver training    Physical Therapy Problems (Active)       Problem: Mobility Transfers       Dates: Start: 02/07/23         Goal: STG-Within one week, patient will perform bed mobility independently       Dates: Start: 02/07/23               Problem: PT-Long Term Goals       Dates: Start: 02/07/23         Goal: LTG-By discharge, patient will propel wheelchair 150ft [place]       Dates: Start: 02/07/23            Goal: LTG-By discharge, patient will ambulate 150ft with LRAD and CGA       Dates: Start: 02/07/23            Goal: LTG-By discharge, patient will transfer one surface to another [place]       Dates: Start: 02/07/23            Goal: LTG-By discharge, patient will perform home exercise program independently       Dates: Start: 02/07/23            Goal: LTG-By discharge, patient will ambulate up/down 1 curb step with [place]       Dates: Start: 02/07/23

## 2023-02-20 NOTE — PROGRESS NOTES
"Rehab Progress Note     Date of Service: 2/20/2023  Chief Complaint: follow up stroke    Interval Events (Subjective)    Patient seen and examined today in the room.  She is hoping that her discharge date can be moved up to the end of the week and therapy agrees she will be ready.  Patient reports her home has been set up and changes made.  Patient's high blood pressure has resolved.  She is tolerating the baclofen at the current dosing.  She denies any pain.    Patient has no other new questions, concerns, or complaints today.     ROS: No changes to bowel, bladder, pain, mood, or sleep.           Objective:  VITAL SIGNS: /78   Pulse 70   Temp 36.9 °C (98.5 °F) (Oral)   Resp 18   Ht 1.676 m (5' 6\")   Wt 79.4 kg (175 lb)   LMP  (LMP Unknown)   SpO2 94%   BMI 28.25 kg/m²   Gen: alert, no apparent distress  CV: Regular rate, regular rhythm  Resp: Clear to auscultation bilaterally  Neuro: Right spastic hemiparesis, right hemianesthesia      No results found for this or any previous visit (from the past 72 hour(s)).      Scheduled Medications   Medication Dose Frequency    metoprolol tartrate  25 mg TWICE DAILY    baclofen  5 mg TID    hydrALAZINE  75 mg Q8HRS    senna-docusate  2 Tablet BID    And    polyethylene glycol/lytes  1 Packet DAILY    losartan  100 mg Q DAY    atorvastatin  80 mg Q EVENING    amLODIPine  10 mg Q DAY    enoxaparin (LOVENOX) injection  40 mg DAILY AT 1800       Current Diet Order   Procedures    Diet Order Diet: Regular (Meds one at a time with liquid wash, larger pills in applesauce)       Assessment:    This patient is a 57 y.o. female admitted for acute inpatient rehabilitation with Hemorrhagic stroke (HCC).    I led and attended the weekly conference, and agree with the IDT conference documentation and plan of care as noted below.    Date of conference: 2/15/2023    Goals and barriers: See IDT note.    Biggest barriers: spasticity, right hemiparesis    CM/social support: Family " to provide 24/7    Anticipated DC date: 2/24, date moved up as patient is making excellent progress    Home health: PT/OT/RN    Equip: Manual wheelchair    Follow up: PCP[facility], Dr. De La Torre      Problem List/Medical Decision Making and Plan:    Left basal hemorrhagic ganglia stroke  From uncontrolled hypertension  Right hemiparesis, improved  Right sarahy-anesthesia, improved  Cognitive impairment, improved  Dysphagia, resolved  Cognitive impairment, improved  Impulsive, improved  Mild expressive aphasia/anomia, improved  Visual impairment, improved  Double vision, improved  Right neglect, improved  Continue therapies    Neuropathic pain, resolved  In the right arm  Was likely secondary to spasticity, off gabapentin    Malaise, resolved  Baclofen side effect, dose decreased    Positive urinalysis  With resolved malaise and altered mental status  Culture negative    Spasticity  Continue baclofen    Hyperlipidemia    Continue statin    Hypertension, improved/resolved  Continue amlodipine, losartan, hydralazine, metoprolol    Alcohol use  Will need counseling  Neuro-psychologist consulted     PFO  Not cause of stroke  Outpatient follow up     Hyponatremia, resolved  Likely 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  Physical Medicine and Rehabilitation

## 2023-02-20 NOTE — THERAPY
"Occupational Therapy  Daily Treatment     Patient Name: Anali Pan  Age:  57 y.o., Sex:  female  Medical Record #: [id number]  Today's Date: 2/20/2023     Precautions  Precautions: Fall Risk  Comments: R hemiplegia, absent R UE/LE light touch sensation         Subjective    \"My boyfriend makes me use my hand all the time.\" Pt reported about if she has been using her R hand for functional tasks      Objective       02/20/23 1431   OT Charge Group   OT Neuromuscular Re-education / Balance (Units) 2   OT Total Time Spent   OT Individual Total Time Spent (Mins) 30   Cognition    Level of Consciousness Alert   Neuro-Muscular Treatments   Neuro-Muscular Treatments Facilitation;Verbal Cuing   Comments see note for details   Interdisciplinary Plan of Care Collaboration   Patient Position at End of Therapy Seated;Other (Comments)  (handoff to rec therapy in gym)     Pt demonstrated AROM in all functional planes I.e. shoulder flexion, abduction, elbow flex/ext, wrist flex/ext, finger flex/ext with increased time and concentration.     Pt engaged in functional grasping on tabletop: able to grasp koosh balls with R hand with 1-step commands given on bringing hand over ball, grasping, holding on and then moving hand over to R hand and releasing. Noted improvement with functional grasp/release and functional reaching with time and able to complete 8/10 without dropping ball. Pt then transitioned to grasping 1\" cubes and placing onto higher surface on R side with min cues needed- able to complete 9/10 without dropping. Pt then engaged in removing large pegs from pegboard with increased time- able to remove 15/15 pegs with increased time and min cues 13/15- only dropping 2/15.      Assessment    Pt tolerated session well. Pt fatigued from long day of therapy but motivated to work with therapy. Pt engaged in functional grasp/release with functional reaching on tabletop. Noted improvement from last session and able to complete " "grasping koosh balls, 1\" cubes with min cues and removing large pegs with dropping less than 15%.     Strengths: Able to follow instructions, Alert and oriented, Effective communication skills, Good insight into deficits/needs, Independent prior level of function, Making steady progress towards goals, Manages pain appropriately, Motivated for self care and independence, Pleasant and cooperative, Supportive family, Willingly participates in therapeutic activities  Barriers: Decreased endurance, Impaired activity tolerance, Impaired balance, Limited mobility, Hemiparesis    Plan    RUE Neuro re-ed, functional cognition, ADLs, activity tolerance, standing balance monitor BP for hypertension. Pt is not always symptomatic.   3x/wk w/ tech    Occupational Therapy Goals (Active)       Problem: Bathing       Dates: Start: 02/15/23         Goal: STG-Within one week, patient will bathe w/ supervision.        Dates: Start: 02/15/23               Problem: Dressing       Dates: Start: 02/15/23         Goal: STG-Within one week, patient will retrieve clothing and dress w/ SBA.        Dates: Start: 02/15/23               Problem: Functional Transfers       Dates: Start: 02/15/23         Goal: STG-Within one week, patient will transfer to toilet w/ supervision.        Dates: Start: 02/15/23            Goal: STG-Within one week, patient will transfer to tub/shower       Dates: Start: 02/15/23       Description: W/ SBA to simulate home set up.            Problem: IADL's       Dates: Start: 02/15/23         Goal: STG-Within one week, patient will access kitchen area w/ min A using LRD.        Dates: Start: 02/15/23               Problem: OT Long Term Goals       Dates: Start: 02/07/23         Goal: LTG-By discharge, patient will complete basic self care tasks w/ mod I - min A.       Dates: Start: 02/07/23            Goal: LTG-By discharge, patient will perform bathroom transfers w/ mod I - min A.       Dates: Start: 02/07/23    "            Problem: Toileting       Dates: Start: 02/07/23         Goal: STG-Within one week, patient will complete toileting tasks w/ min.       Dates: Start: 02/07/23

## 2023-02-20 NOTE — THERAPY
"Physical Therapy   Daily Treatment     Patient Name: Anali Pan  Age:  57 y.o., Sex:  female  Medical Record #: [id number]  Today's Date: 2/20/2023     Precautions  Precautions: (P) Fall Risk  Comments: (P) R hemiplegia, absent R UE/LE light touch sensation    Subjective    \"I had food poisoning yesterday, so I'm still recovering from that\"     Objective       02/20/23 0830 02/20/23 1301   PT Charge Group   PT Gait Training (Units) 2  --    PT Neuromuscular Re-Education / Balance (Units) 1 1   PT Therapeutic Activities (Units) 1 1   PT Total Time Spent   PT Individual Total Time Spent (Mins) 60 30   Gait Functional Level of Assist    Gait Level Of Assist Contact Guard Assist  --    Assistive Device Quad Cane  --    Distance (Feet) 150  --    # of Times Distance was Traveled 1  (as well as 100ft x3)  --    Deviation Decreased Base Of Support;Bradykinetic  (2 point step through pattern, decr R LE clearance when fatigued)  --    Wheelchair Functional Level of Assist   Wheelchair Assist Modified Independent Modified Independent   Distance Wheelchair (Feet or Distance) 200  --    Stairs Functional Level of Assist   Level of Assist with Stairs Minimal Assist  --    # of Stairs Climbed 1  (6inch curb step with NBQC x4 reps)  --    Transfer Functional Level of Assist   Bed, Chair, Wheelchair Transfer Supervised Contact Guard Assist  (WC>bed to R required HHA)   Bed Chair Wheelchair Transfer Description Squat pivot transfer to wheelchair;Verbal cueing  --    Bed Mobility    Supine to Sit Independent  --    Sit to Supine Independent Independent   Sit to Stand Contact Guard Assist Contact Guard Assist   Scooting Independent  --    Rolling Independent  --    Neuro-Muscular Treatments   Comments hooklying LTR x10, prone on elbows R UE reaching with facilitation, prone on elbows L UE reaching with stabilization at R shoulder, L sidelying R LE flex/add dynamic reversals // bars R LE forward repeated step up to 2inch step with L " UE support x10, sideways step up R LE with L UE support x10   Interdisciplinary Plan of Care Collaboration   IDT Collaboration with  Occupational Therapist;Physician Occupational Therapist   Patient Position at End of Therapy Seated;Call Light within Reach;Tray Table within Reach;Phone within Reach In Bed;Call Light within Reach;Tray Table within Reach;Phone within Reach   Collaboration Comments potential early DC pt feeling tired from frequent BMs over weekend     1st tx- Discussed potentially early DC at WC level after FT on Weds and Thurs. Contacted [facility] for Xtern AFO order. completed R UE and LE PROM    2nd tx- Completed floor recovery training with [place] and max VC's      Assessment    Pt is making steady progress toward her goals. She is trending toward [place] for transfers at WC level however benefits from occasional verbal cueing for set up. She progressed to ambulating 150ft NBQC with CGA and 2 point pattern, however this requires significant concentration for pt to complete. Pt has improved R ankle inversion/plantarflexion tone in gait when using Xtern AFO and O/P has been contacted to order AFO.  Strengths: Able to follow instructions, Alert and oriented, Effective communication skills, Good carryover of learning, Good endurance, Independent prior level of function  Barriers: Generalized weakness, Hemiparesis, Impaired balance    Plan    Set up room for [place] at WC level, gait training NBQC, NRE for R LE forced use, continue  for R LE and aquatic tx as adjunct therapy     Passport items to be completed:  Get in/out of bed safely, in/out of a vehicle, safely use mobility device, walk or wheel around home/community, navigate up and down stairs, show how to get up/down from the ground, ensure home is accessible, demonstrate HEP, complete caregiver training    Physical Therapy Problems (Active)       Problem: Mobility Transfers       Dates: Start: 02/07/23         Goal: STG-Within one week, patient will  perform bed mobility independently       Dates: Start: 02/07/23               Problem: PT-Long Term Goals       Dates: Start: 02/07/23         Goal: LTG-By discharge, patient will propel wheelchair 150ft [place]       Dates: Start: 02/07/23            Goal: LTG-By discharge, patient will ambulate 150ft with LRAD and CGA       Dates: Start: 02/07/23            Goal: LTG-By discharge, patient will transfer one surface to another [place]       Dates: Start: 02/07/23            Goal: LTG-By discharge, patient will perform home exercise program independently       Dates: Start: 02/07/23            Goal: LTG-By discharge, patient will ambulate up/down 1 curb step with [place]       Dates: Start: 02/07/23

## 2023-02-21 ENCOUNTER — APPOINTMENT (OUTPATIENT)
Dept: PHYSICAL THERAPY | Facility: REHABILITATION | Age: 58
DRG: 057 | End: 2023-02-21
Attending: PHYSICAL MEDICINE & REHABILITATION
Payer: COMMERCIAL

## 2023-02-21 ENCOUNTER — APPOINTMENT (OUTPATIENT)
Dept: OCCUPATIONAL THERAPY | Facility: REHABILITATION | Age: 58
DRG: 057 | End: 2023-02-21
Attending: PHYSICAL MEDICINE & REHABILITATION
Payer: COMMERCIAL

## 2023-02-21 PROCEDURE — 97530 THERAPEUTIC ACTIVITIES: CPT

## 2023-02-21 PROCEDURE — 97116 GAIT TRAINING THERAPY: CPT

## 2023-02-21 PROCEDURE — 700102 HCHG RX REV CODE 250 W/ 637 OVERRIDE(OP): Performed by: PHYSICAL MEDICINE & REHABILITATION

## 2023-02-21 PROCEDURE — 700111 HCHG RX REV CODE 636 W/ 250 OVERRIDE (IP): Performed by: PHYSICAL MEDICINE & REHABILITATION

## 2023-02-21 PROCEDURE — 97535 SELF CARE MNGMENT TRAINING: CPT

## 2023-02-21 PROCEDURE — 97110 THERAPEUTIC EXERCISES: CPT

## 2023-02-21 PROCEDURE — A9270 NON-COVERED ITEM OR SERVICE: HCPCS | Performed by: PHYSICAL MEDICINE & REHABILITATION

## 2023-02-21 PROCEDURE — 770010 HCHG ROOM/CARE - REHAB SEMI PRIVAT*

## 2023-02-21 PROCEDURE — 99232 SBSQ HOSP IP/OBS MODERATE 35: CPT | Performed by: PHYSICAL MEDICINE & REHABILITATION

## 2023-02-21 PROCEDURE — 97112 NEUROMUSCULAR REEDUCATION: CPT

## 2023-02-21 RX ADMIN — AMLODIPINE BESYLATE 10 MG: 5 TABLET ORAL at 05:37

## 2023-02-21 RX ADMIN — METOPROLOL TARTRATE 25 MG: 25 TABLET, FILM COATED ORAL at 05:37

## 2023-02-21 RX ADMIN — HYDRALAZINE HYDROCHLORIDE 75 MG: 25 TABLET, FILM COATED ORAL at 05:37

## 2023-02-21 RX ADMIN — LOSARTAN POTASSIUM 100 MG: 25 TABLET, FILM COATED ORAL at 05:36

## 2023-02-21 RX ADMIN — BACLOFEN 5 MG: 10 TABLET ORAL at 08:28

## 2023-02-21 RX ADMIN — BACLOFEN 5 MG: 10 TABLET ORAL at 17:15

## 2023-02-21 RX ADMIN — HYDRALAZINE HYDROCHLORIDE 75 MG: 25 TABLET, FILM COATED ORAL at 20:51

## 2023-02-21 RX ADMIN — ENOXAPARIN SODIUM 40 MG: 40 INJECTION SUBCUTANEOUS at 17:15

## 2023-02-21 RX ADMIN — ACETAMINOPHEN 650 MG: 325 TABLET ORAL at 08:29

## 2023-02-21 RX ADMIN — BACLOFEN 5 MG: 10 TABLET ORAL at 20:52

## 2023-02-21 RX ADMIN — METOPROLOL TARTRATE 25 MG: 25 TABLET, FILM COATED ORAL at 17:15

## 2023-02-21 RX ADMIN — ATORVASTATIN CALCIUM 80 MG: 40 TABLET, FILM COATED ORAL at 20:52

## 2023-02-21 ASSESSMENT — PAIN DESCRIPTION - PAIN TYPE
TYPE: ACUTE PAIN
TYPE: ACUTE PAIN

## 2023-02-21 ASSESSMENT — GAIT ASSESSMENTS
GAIT LEVEL OF ASSIST: CONTACT GUARD ASSIST
DEVIATION: DECREASED BASE OF SUPPORT;BRADYKINETIC
DISTANCE (FEET): 160
ASSISTIVE DEVICE: QUAD CANE

## 2023-02-21 ASSESSMENT — ACTIVITIES OF DAILY LIVING (ADL)
TOILET_TRANSFER_DESCRIPTION: ADAPTIVE EQUIPMENT;GRAB BAR
BED_CHAIR_WHEELCHAIR_TRANSFER_DESCRIPTION: SQUAT PIVOT TRANSFER TO WHEELCHAIR
BED_CHAIR_WHEELCHAIR_TRANSFER_DESCRIPTION: SQUAT PIVOT TRANSFER TO WHEELCHAIR
TOILETING_LEVEL_OF_ASSIST_DESCRIPTION: INCREASED TIME
TOILET_TRANSFER_DESCRIPTION: GRAB BAR;INCREASED TIME
TUB_SHOWER_TRANSFER_DESCRIPTION: GRAB BAR;INCREASED TIME;INITIAL PREPARATION FOR TASK;SUPERVISION FOR SAFETY

## 2023-02-21 NOTE — PROGRESS NOTES
"REHABILITATION PSYCHOLOGY FOLLOW-UP:  Reason for admission: Nontraumatic acute hemorrhage of basal ganglia (HCC) [I61.9]  Hemorrhagic stroke (HCC) [I61.9]  Length of Visit: 43 minutes    Chief Complaint: Post stroke recovery    S: Met with the patient for brief individual psychotherapy. Patient presented with a euthymic affect and reported a \"doing pretty well\" mood.  Session focused on lifestyle management of post stroke recovery after returning home to include diet, exercise, and substance use particularly alcohol.  Remainder of session focused on understanding brain health, brain injury, and alcohol.  We will continue to follow through discharge    O: Psychiatric Examination:  Vitals: Blood pressure 117/80, pulse 80, temperature 37.3 °C (99.1 °F), temperature source Oral, resp. rate 18, height 1.676 m (5' 6\"), weight 79.4 kg (175 lb), SpO2 93 %, not currently breastfeeding.  Musculoskeletal: Sitting in wheelchair normal psychomotor activity, no tics or unusual mannerisms noted  Appearance and Eye Contact: Easily established rapport WDWN, appropriate dress and grooming. Behavior is calm, cooperative,  appropriate eye contact  Attention/Alertness: Alert  Thought Process: Linear logical goal directed  Thought Content: No psychotic processes noted  Speech: Clear with normal rate and rhythm  Mood: \"A little overwhelmed\"            Affect: Euthymic somewhat tearful         SI/HI: Denies     Memory: Recent and remote memory appear intact      Orientation: Alert and oriented to person place and time  Insight into symptoms: Within normal limits  Judgement into symptoms: Within normal limits        ASSESSMENT: Anali Pan is a 57 y.o.  female with a past medical history of hypertension noncompliant on antihypertensive medications and alcohol use of approximately 5-6 drinks per day;  who presented on 1/31/2023 11:17 PM as a transfer from [facility] with right-sided weakness.  Per documentation, patient had " acute onset right-sided weakness and difficulty speaking, she arrived at the outside hospital with SBP up to 200.  A CT head was obtained which showed an acute left thalamic hemorrhage patient was transferred to [facility] for higher level of care.  Patient was admitted to the ICU with strict SBP goal of 100-1 40.  She was placed on a nicardipine drip for blood pressure control.  An MRI brain was obtained with evidence of an acute/subacute hemorrhage in the left thalamus adjacent to the parietal corona radiata with mass effect upon the posterior third ventricle with slight midline shift measuring 4 to 5 mm there is no evidence of abnormality relating to underlying lesion or abnormal vascularity.  Patient's hospital course has been complicated by onset of alcohol withdrawal, patient had poor participation with therapy due to anxiety and unwillingness to complete full commands for therapy session.  Patient is on a Precedex drip for agitation.  Echocardiogram was obtained with an EF of 70%, grade 1 diastolic dysfunction with evidence right right to left shift/PFO     Psychology was consulted due to difficulty with adjustment to post stroke recovery.  Session focused on assessment of current functioning as well as psychoeducation regarding the common cognitive and emotional impacts of stroke and stroke recovery as well as cognitive expectations for post stroke recovery.  We will continue to follow     DSM5 Diagnostic Considerations: Adjustment disorder with mixed anxiety and depressed mood        PLAN:  Records reviewed: Yes  Discussed patient with other provider: Gaye  We will continue to follow  Thank you for the consult.     Indy Hernandez, Ph.D

## 2023-02-21 NOTE — THERAPY
"Recreational Therapy  Daily Treatment     Patient Name: Anali Pan  AGE:  57 y.o., SEX:  female  Medical Record #: [id number]  Today's Date: 2/20/2023       Subjective    \"I just learned the game. \"I've never played before\" when she was asked what her next step should be in the game of card golf.      Objective       02/20/23 1501   Procedural Tracking   Procedural Tracking Leisure Skills Development;Cognitive Skills Training   Treatment Time   Total Time Spent (mins) 30   Functional Ability Status - Cognitive   Attention Span Remains on Task   Comprehension Requires Cueing;Follows One Step Commands   Judgment Needs Assistance   Cognitive Comments Min to Mod verbal cues required for participation.   Functional Ability Status - Emotional    Affect Appropriate   Mood Appropriate   Behavior Appropriate   Skilled Intervention    Skilled Intervention Cognitive Leisure   Skilled Intervention Comments Pt learned and participated in a card game called golf.   Interdisciplinary Plan of Care Collaboration   IDT Collaboration with  Recreation Therapist;Therapy Tech   Patient Position at End of Therapy Seated   Collaboration Comments Hand off of care to therapy tech in her room.   Strengths & Barriers   Strengths Pleasant and cooperative;Motivated for self care and independence;Willingly participates in therapeutic activities   Barriers Hemiparesis;Impaired activity tolerance;Impaired balance;Impaired carryover of learning;Impaired insight/denial of deficits;Impaired functional cognition;Limited mobility         Assessment    Pt required Min to Mod cues for following the rules and problem solving the correct next step in the new game she was learning. Pt was given a written set of rules for her to follow. She required cues to refer to this rule sheet when she became stuck in the game. At times, the rules needed to be repeated greater than 3 times.     Strengths: (P) Pleasant and cooperative, Motivated for self care and " independence, Willingly participates in therapeutic activities  Barriers: (P) Hemiparesis, Impaired activity tolerance, Impaired balance, Impaired carryover of learning, Impaired insight/denial of deficits, Impaired functional cognition, Limited mobility    Plan    Will continue with cognitive leisure activities for problem solving skills.     Passport items to be completed:  Verbalize two positive leisure activities, discuss returning to work, hobbies, community groups or volunteer activities, explore community resources

## 2023-02-21 NOTE — THERAPY
Recreational Therapy  Daily Treatment     Patient Name: Anali Pan  AGE:  57 y.o., SEX:  female  Medical Record #: [id number]  Today's Date: 2/21/2023       Subjective    Patient was ready and willing for recreation therapy session.      Objective       02/21/23 1031   Procedural Tracking   Procedural Tracking Leisure Skills Awareness;Leisure Skills Development;Cognitive Skills Training;Fine Motor Functional Leisure Skills   Treatment Time   Total Time Spent (mins) 30   Total Time Missed 0   Functional Ability Status - Cognitive   Attention Span Remains on Task with Cueing   Comprehension Follows One Step Commands   Judgment Able to Make Independent Decisions   Functional Ability Status - Emotional    Affect Appropriate   Mood Appropriate   Behavior Appropriate;Cooperative   Leisure Competence Measure   Leisure Awareness Minimal Assist   Leisure Attitude Minimal Assist   Leisure Skills Minimal Assist   Skilled Intervention    Skilled Intervention Cognitive Leisure   Interdisciplinary Plan of Care Collaboration   Patient Position at End of Therapy   (In gym with OT)   Strengths & Barriers   Strengths Able to follow instructions;Alert and oriented;Effective communication skills;Good carryover of learning;Willingly participates in therapeutic activities;Supportive family;Pleasant and cooperative;Motivated for self care and independence   Barriers Decreased endurance;Fatigue;Generalized weakness;Impaired balance;Impaired functional cognition     Patient taught daughter how to play recently learned card game, Golf. Patient required mod prompts in remember rules, benefiting from using a cheat sheet. Patient required mod cues for divided attention, processing speed and planning more than one step at a time. Patient needed rules repeated 2 times, for the majority of the game. Patient required max cues for strategy.     Assessment    Patient is 57 y.o. female with a past medical history of hypertension noncompliant on  antihypertensive medications and a diagnosis of Stroke: Right Body Involvement (Left Brain).      Strengths: Able to follow instructions, Alert and oriented, Effective communication skills, Good carryover of learning, Willingly participates in therapeutic activities, Supportive family, Pleasant and cooperative, Motivated for self care and independence  Barriers: Decreased endurance, Fatigue, Generalized weakness, Impaired balance, Impaired functional cognition    Plan    Patient will benefit from continued recreation therapy.     Passport items to be completed:  Verbalize two positive leisure activities, discuss returning to work, hobbies, community groups or volunteer activities, explore community resources

## 2023-02-21 NOTE — PROGRESS NOTES
"Rehab Progress Note     Date of Service: 2/21/2023  Chief Complaint: follow up stroke    Interval Events (Subjective)    Patient seen and evaluated today in the therapy gym.  Her daughter is present.  We discussed moving up her discharge date to this Friday.  We also discussed strategies to be successful at quitting alcohol use which the patient does want to do.    Patient has no other new questions, concerns, or complaints today.     ROS: No changes to bowel, bladder, pain, mood, or sleep.         Objective:  VITAL SIGNS: /76   Pulse 72   Temp 37.3 °C (99.1 °F) (Oral)   Resp 18   Ht 1.676 m (5' 6\")   Wt 79.4 kg (175 lb)   LMP  (LMP Unknown)   SpO2 91%   BMI 28.25 kg/m²   Gen: alert, no apparent distress  CV: Regular rate, regular rhythm  Resp: Clear to auscultation bilaterally  Neuro: Right spastic hemiparesis, right hemianesthesia      No results found for this or any previous visit (from the past 72 hour(s)).      Scheduled Medications   Medication Dose Frequency    metoprolol tartrate  25 mg TWICE DAILY    baclofen  5 mg TID    hydrALAZINE  75 mg Q8HRS    senna-docusate  2 Tablet BID    And    polyethylene glycol/lytes  1 Packet DAILY    losartan  100 mg Q DAY    atorvastatin  80 mg Q EVENING    amLODIPine  10 mg Q DAY    enoxaparin (LOVENOX) injection  40 mg DAILY AT 1800       Current Diet Order   Procedures    Diet Order Diet: Regular (Meds one at a time with liquid wash, larger pills in applesauce)       Assessment:    This patient is a 57 y.o. female admitted for acute inpatient rehabilitation with Hemorrhagic stroke (HCC).    I led and attended the weekly conference, and agree with the IDT conference documentation and plan of care as noted below.    Date of conference: 2/15/2023    Goals and barriers: See IDT note.    Biggest barriers: spasticity, right hemiparesis    CM/social support: Family to provide 24/7    Anticipated DC date: 2/24, date moved up as patient is making excellent " progress    Home health: PT/OT/RN    Equip: Manual wheelchair    Follow up: PCP[facility], Dr. De La Torre      Problem List/Medical Decision Making and Plan:    Left basal hemorrhagic ganglia stroke  From uncontrolled hypertension  Right hemiparesis, improved  Right sarahy-anesthesia, improved  Cognitive impairment, improved  Dysphagia, resolved  Cognitive impairment, improved  Impulsive, improved  Mild expressive aphasia/anomia, improved  Visual impairment, improved  Double vision, improved  Right neglect, improved  Continue therapies    Neuropathic pain, resolved  In the right arm  Was likely secondary to spasticity, off gabapentin    Malaise, resolved  Baclofen side effect, dose decreased    Positive urinalysis  With resolved malaise and altered mental status  Culture negative    Spasticity  Continue baclofen at low-dose, was unable to tolerate more than 5 mg 3 times daily    Hyperlipidemia    Continue statin    Hypertension, improved/resolved  Continue amlodipine, losartan, hydralazine, metoprolol  Currently with excellent control    Alcohol use  Counseling provided today, patient does want to quit  Neuro-psychologist consulted     PFO  Not cause of stroke  Outpatient follow up     Hyponatremia, resolved  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  Physical Medicine and Rehabilitation

## 2023-02-21 NOTE — CARE PLAN
Problem: Pain - Standard  Goal: Alleviation of pain or a reduction in pain to the patient’s comfort goal  Outcome: Progressing  Note: Patient able to verbalize pain level and verbalize an acceptable level of pain.    The patient is Stable - Low risk of patient condition declining or worsening    Shift Goals  Clinical Goals: safety  Patient Goals: safety    Progress made toward(s) clinical / shift goals:  pt pain controlled with prn meds    Patient is not progressing towards the following goals:

## 2023-02-21 NOTE — CARE PLAN
Safety measures enforced ,pt compliant to safety measures, needs anticipated and attended.  Problem: Psychosocial  Goal: Patient's level of anxiety will decrease  Outcome: Progressing  Note: Pt calm and cheerful, no s/s of anxiety noted.     Problem: Pain - Standard  Goal: Alleviation of pain or a reduction in pain to the patient’s comfort goal  Outcome: Progressing  Note: Assessed for pain and discomfort, pain under control .   The patient is Stable - Low risk of patient condition declining or worsening    Shift Goals  Clinical Goals: safety  Patient Goals: safety    Progress made toward(s) clinical / shift goals:  Pt free from fall and injury .

## 2023-02-21 NOTE — THERAPY
Occupational Therapy  Daily Treatment     Patient Name: Anali Pan  Age:  57 y.o., Sex:  female  Medical Record #: [id number]  Today's Date: 2/21/2023     Precautions  Precautions: (P) Fall Risk  Comments: (P) R hemiplegia, absent R UE/LE light touch sensation, mod I in room w/ w/c         Subjective    Pt reports daughter is worried about new d/c date.      Objective       02/21/23 0931 02/21/23 1101   OT Charge Group   OT Self Care / ADL (Units) 2  --    OT Neuromuscular Re-education / Balance (Units) 1  --    OT Therapy Activity (Units) 1 2   OT Total Time Spent   OT Individual Total Time Spent (Mins) 60 30   Precautions   Precautions Fall Risk Fall Risk   Comments R hemiplegia, absent R UE/LE light touch sensation, mod I in room w/ w/c R hemiplegia, absent R UE/LE light touch sensation, mod I in room w/ w/c   Functional Level of Assist   Upper Body Dressing Modified Independent  --    Upper Body Dressing Description Increased time  --    Lower Body Dressing Modified Independent  --    Lower Body Dressing Description Increased time  --    Toileting Modified Independent  --    Toileting Description Increased time  --    Toilet Transfers Modified Independent  --    Toilet Transfer Description Grab bar;Increased time  (w/c<>toilet)  --    Tub / Shower Transfers Contact Guard Assist  (threshold shower transfer)  --    Tub Shower Transfer Description Grab bar;Increased time;Initial preparation for task;Supervision for safety  --    Fine Motor / Dexterity    Fine Motor / Dexterity Yes Yes   Fine Motor / Dexterity Interventions Orange theraputty exercises, FM home exercise handout Pt used RUE to remove and place large peg in peg board. Pt removed and placed 5 pegs w/ verbal cues to focus on using distal UE.   Neuro-Muscular Treatments   Neuro-Muscular Treatments Biofeedback  --    Comments mirror therapy for RUE  --    Interdisciplinary Plan of Care Collaboration   IDT Collaboration with   --  Recreation Therapist    Patient Position at End of Therapy Seated;Tray Table within Reach;Family / Friend in Room  (in gym waiting for rec therapy w/ daughter) Seated;Phone within Reach;Tray Table within Reach;Call Light within Reach   Collaboration Comments  --  tranisiton of care       Family training was completed w/ pt's daughter to address concerns about new d/c date. Pt demonstrated mod I w/ w/c mobility and transfers in the room. Pt required CGA for threshold shower transfer to simulate home set-up. Pt reports her boyfriend will be with her 24/7 for 1st week and then she will have other family member assist her as needed. Pt is safe at w/c level and requires CGA while ambulating w/ cane. Pt is recommended to have supervision - CGA for shower and shower transfers. Pt daughter was educated on neuro re-education and specific RUE exercises to cont at home. Pt was given HEP on fine motor activities, mirror therapy, and UE exercises. Pt's daughter reports she believes her mom will be safe d/cing this week, but wants her to get as much therapy as possible.     Pt ambulated around gym w/ quad cane and CGA. Pt cont to require verbal cues for gait pattern.   Assessment    Pt cont to demonstrate increased balance and safety during transfers and ADLs. Pt and pt's daughter were receptive to family training and are agreeable to d/c this week. Pt reports understanding that she will need to complete therapy at home to cont to make progress w/ RUE function.   Strengths: Able to follow instructions, Alert and oriented, Effective communication skills, Good insight into deficits/needs, Independent prior level of function, Making steady progress towards goals, Manages pain appropriately, Motivated for self care and independence, Pleasant and cooperative, Supportive family, Willingly participates in therapeutic activities  Barriers: Decreased endurance, Impaired activity tolerance, Impaired balance, Limited mobility, Hemiparesis    Plan    RUE Neuro  re-ed, functional cognition, ADLs, activity tolerance, standing balance monitor BP for hypertension. Pt is not always symptomatic.   3x/wk w/ tech       Occupational Therapy Goals (Active)       Problem: Bathing       Dates: Start: 02/15/23         Goal: STG-Within one week, patient will bathe w/ supervision.        Dates: Start: 02/15/23               Problem: Dressing       Dates: Start: 02/15/23         Goal: STG-Within one week, patient will retrieve clothing and dress w/ SBA.        Dates: Start: 02/15/23               Problem: Functional Transfers       Dates: Start: 02/15/23         Goal: STG-Within one week, patient will transfer to toilet w/ supervision.        Dates: Start: 02/15/23            Goal: STG-Within one week, patient will transfer to tub/shower       Dates: Start: 02/15/23       Description: W/ SBA to simulate home set up.            Problem: IADL's       Dates: Start: 02/15/23         Goal: STG-Within one week, patient will access kitchen area w/ min A using LRD.        Dates: Start: 02/15/23               Problem: OT Long Term Goals       Dates: Start: 02/07/23         Goal: LTG-By discharge, patient will complete basic self care tasks w/ mod I - min A.       Dates: Start: 02/07/23            Goal: LTG-By discharge, patient will perform bathroom transfers w/ mod I - min A.       Dates: Start: 02/07/23               Problem: Toileting       Dates: Start: 02/07/23         Goal: STG-Within one week, patient will complete toileting tasks w/ min.       Dates: Start: 02/07/23

## 2023-02-22 ENCOUNTER — APPOINTMENT (OUTPATIENT)
Dept: PHYSICAL THERAPY | Facility: REHABILITATION | Age: 58
DRG: 057 | End: 2023-02-22
Attending: PHYSICAL MEDICINE & REHABILITATION
Payer: COMMERCIAL

## 2023-02-22 ENCOUNTER — APPOINTMENT (OUTPATIENT)
Dept: OCCUPATIONAL THERAPY | Facility: REHABILITATION | Age: 58
DRG: 057 | End: 2023-02-22
Attending: PHYSICAL MEDICINE & REHABILITATION
Payer: COMMERCIAL

## 2023-02-22 PROCEDURE — 700111 HCHG RX REV CODE 636 W/ 250 OVERRIDE (IP): Performed by: PHYSICAL MEDICINE & REHABILITATION

## 2023-02-22 PROCEDURE — 97116 GAIT TRAINING THERAPY: CPT

## 2023-02-22 PROCEDURE — 97110 THERAPEUTIC EXERCISES: CPT

## 2023-02-22 PROCEDURE — 97530 THERAPEUTIC ACTIVITIES: CPT

## 2023-02-22 PROCEDURE — 97112 NEUROMUSCULAR REEDUCATION: CPT

## 2023-02-22 PROCEDURE — 770010 HCHG ROOM/CARE - REHAB SEMI PRIVAT*

## 2023-02-22 PROCEDURE — A9270 NON-COVERED ITEM OR SERVICE: HCPCS | Performed by: PHYSICAL MEDICINE & REHABILITATION

## 2023-02-22 PROCEDURE — 700102 HCHG RX REV CODE 250 W/ 637 OVERRIDE(OP): Performed by: PHYSICAL MEDICINE & REHABILITATION

## 2023-02-22 PROCEDURE — 99232 SBSQ HOSP IP/OBS MODERATE 35: CPT | Performed by: PHYSICAL MEDICINE & REHABILITATION

## 2023-02-22 RX ADMIN — ACETAMINOPHEN 650 MG: 325 TABLET ORAL at 17:09

## 2023-02-22 RX ADMIN — ENOXAPARIN SODIUM 40 MG: 40 INJECTION SUBCUTANEOUS at 17:12

## 2023-02-22 RX ADMIN — BACLOFEN 5 MG: 10 TABLET ORAL at 15:23

## 2023-02-22 RX ADMIN — LOSARTAN POTASSIUM 100 MG: 25 TABLET, FILM COATED ORAL at 06:03

## 2023-02-22 RX ADMIN — BACLOFEN 5 MG: 10 TABLET ORAL at 08:48

## 2023-02-22 RX ADMIN — HYDRALAZINE HYDROCHLORIDE 75 MG: 25 TABLET, FILM COATED ORAL at 15:23

## 2023-02-22 RX ADMIN — ATORVASTATIN CALCIUM 80 MG: 40 TABLET, FILM COATED ORAL at 21:04

## 2023-02-22 RX ADMIN — HYDRALAZINE HYDROCHLORIDE 75 MG: 25 TABLET, FILM COATED ORAL at 21:05

## 2023-02-22 RX ADMIN — HYDRALAZINE HYDROCHLORIDE 75 MG: 25 TABLET, FILM COATED ORAL at 06:02

## 2023-02-22 RX ADMIN — AMLODIPINE BESYLATE 10 MG: 5 TABLET ORAL at 06:03

## 2023-02-22 RX ADMIN — BACLOFEN 5 MG: 10 TABLET ORAL at 21:04

## 2023-02-22 RX ADMIN — METOPROLOL TARTRATE 25 MG: 25 TABLET, FILM COATED ORAL at 17:12

## 2023-02-22 RX ADMIN — METOPROLOL TARTRATE 25 MG: 25 TABLET, FILM COATED ORAL at 06:03

## 2023-02-22 ASSESSMENT — GAIT ASSESSMENTS
ASSISTIVE DEVICE: QUAD CANE
GAIT LEVEL OF ASSIST: MINIMAL ASSIST
DEVIATION: DECREASED BASE OF SUPPORT;BRADYKINETIC
DISTANCE (FEET): 80
GAIT LEVEL OF ASSIST: CONTACT GUARD ASSIST
DISTANCE (FEET): 45
ASSISTIVE DEVICE: QUAD CANE

## 2023-02-22 ASSESSMENT — ACTIVITIES OF DAILY LIVING (ADL): BED_CHAIR_WHEELCHAIR_TRANSFER_DESCRIPTION: SQUAT PIVOT TRANSFER TO WHEELCHAIR

## 2023-02-22 NOTE — THERAPY
Occupational Therapy  Daily Treatment     Patient Name: Anali Pan  Age:  57 y.o., Sex:  female  Medical Record #: [id number]  Today's Date: 2/22/2023     Precautions  Precautions: (P) Fall Risk  Comments: (P) R hemiplegia, absent R UE/LE light touch sensation, mod I in room w/ w/c         Subjective    Pt and pt's 2 adult children participated in family training.      Objective       02/22/23 0901   OT Charge Group   OT Therapy Activity (Units) 3   OT Therapeutic Exercise (Units) 1   OT Total Time Spent   OT Individual Total Time Spent (Mins) 60   Precautions   Precautions Fall Risk   Comments R hemiplegia, absent R UE/LE light touch sensation, mod I in room w/ w/c   Sitting Upper Body Exercises   Front Arm Raise 1 set of 10;Right    Side Arm Raise 1 set of 10;Right    Shoulder Extension 1 set of 10;Right    Internal Shoulder Rotation 1 set of 10;Right    External Shoulder Rotation 1 set of 10;Right    Bicep Curls 1 set of 10;Right    Wrist Flexion / Extension 1 set of 10;Right    Comments Family educated on how to assist with RUE strenthening   Interdisciplinary Plan of Care Collaboration   IDT Collaboration with  Family / Caregiver;Physical Therapist;   Patient Position at End of Therapy Seated;Tray Table within Reach;Call Light within Reach;Phone within Reach   Collaboration Comments family training; transition of care; A w/ d/c planningfamily training; transition of care; A w/ d/c planning       Family training focused on educating pt's family on pt's CLOF, level of assist needed, and HEP. Pt's family is concerned that she will not have enough assistance due to working schedule of adult children, boyfriend, and mother. Pt reports her boyfriend will be staying with her for the 1st week and then family and friends will come together to assist as needed. Pt will need supervision for transfers and while standing for ADLs directly post d/c. Pt's family was educated on neuro re-education, exercises,  mirror therapy, and FM activities to complete at home with pt. Pt's  was notified that family is seeking resources for caregivers and medical transportation.     Assessment  Pt and pt's family demonstrated good understanding of education above. Pt cont to report she feels ready and confident to d/c home on Friday. Pt's family was educated and given resources to support the pt at home. All questions were answered during session.   Strengths: Able to follow instructions, Alert and oriented, Effective communication skills, Good insight into deficits/needs, Independent prior level of function, Making steady progress towards goals, Manages pain appropriately, Motivated for self care and independence, Pleasant and cooperative, Supportive family, Willingly participates in therapeutic activities  Barriers: Decreased endurance, Impaired activity tolerance, Impaired balance, Limited mobility, Hemiparesis    Plan    Family training w/ boyfriend 2/23 d/c 2/24    Occupational Therapy Goals (Active)       Problem: Bathing       Dates: Start: 02/15/23         Goal: STG-Within one week, patient will bathe w/ supervision.        Dates: Start: 02/15/23               Problem: Dressing       Dates: Start: 02/15/23         Goal: STG-Within one week, patient will retrieve clothing and dress w/ SBA.        Dates: Start: 02/15/23               Problem: Functional Transfers       Dates: Start: 02/15/23         Goal: STG-Within one week, patient will transfer to toilet w/ supervision.        Dates: Start: 02/15/23            Goal: STG-Within one week, patient will transfer to tub/shower       Dates: Start: 02/15/23       Description: W/ SBA to simulate home set up.            Problem: IADL's       Dates: Start: 02/15/23         Goal: STG-Within one week, patient will access kitchen area w/ min A using LRD.        Dates: Start: 02/15/23               Problem: OT Long Term Goals       Dates: Start: 02/07/23         Goal: LTG-By  discharge, patient will complete basic self care tasks w/ mod I - min A.       Dates: Start: 02/07/23            Goal: LTG-By discharge, patient will perform bathroom transfers w/ mod I - min A.       Dates: Start: 02/07/23               Problem: Toileting       Dates: Start: 02/07/23         Goal: STG-Within one week, patient will complete toileting tasks w/ min.       Dates: Start: 02/07/23

## 2023-02-22 NOTE — DISCHARGE PLANNING
CM met with patient and her adult daughter and son and gave them information for private caregivers and local transportation resources.  CM will also complete an application for disabled placards.  CM will continue to monitor for DC needs.

## 2023-02-22 NOTE — PROGRESS NOTES
Received bedside shift report from Alexander MOSLEY RN regarding patient and assumed care. Patient awake, calm and stable, currently positioned in bed for comfort and safety; call light within reach. Denies pain or discomfort at this time. Will continue to monitor.

## 2023-02-22 NOTE — DISCHARGE PLANNING
CM faxed application for disabled placards to [employer].  Copy scanned into EMR.  Original w/fax confirmation given to patient.  CM will continue to monitor for DC needs.

## 2023-02-22 NOTE — THERAPY
Physical Therapy   Daily Treatment     Patient Name: Anali Pan  Age:  57 y.o., Sex:  female  Medical Record #: [id number]  Today's Date: 2/21/2023     Precautions  Precautions: Fall Risk  Comments: R hemiplegia, absent R UE/LE light touch sensation, mod I in room w/ w/c    Subjective    Pt reports that her family is concerned about moving up DC date though they are agreeable to complete training and see how pt is moving     Objective       02/21/23 0901 02/21/23 1401   PT Charge Group   PT Gait Training (Units)  --  2   PT Therapeutic Exercise (Units)  --  1   PT Therapeutic Activities (Units) 2 1   PT Total Time Spent   PT Individual Total Time Spent (Mins) 30 60   Gait Functional Level of Assist    Gait Level Of Assist  --  Contact Guard Assist  (as good as SBA)   Assistive Device  --  Quad Cane   Distance (Feet)  --  160   # of Times Distance was Traveled  --  1  (as well as 60ftx3)   Deviation  --  Decreased Base Of Support;Bradykinetic  (2 point pattern, VC's for weight shift and incr R LE WBing)   Wheelchair Functional Level of Assist   Wheelchair Assist Modified Independent  --    Distance Wheelchair (Feet or Distance) 80  --    Transfer Functional Level of Assist   Bed, Chair, Wheelchair Transfer Modified Independent Modified Independent   Bed Chair Wheelchair Transfer Description Squat pivot transfer to wheelchair Squat pivot transfer to wheelchair   Toilet Transfers Modified Independent  --    Toilet Transfer Description Adaptive equipment;Grab bar  --    Supine Lower Body Exercise   Other Exercises  --  R ankle DF/eversion with knee extended, R DF/eversion in hooklying, Gross R LE flexion with emphasis on eccentric lowering x15 each, [id number]   Sitting Lower Body Exercises   Sit to Stand  --  2 sets of 10  (staggered stance biasing R LE WBing)   Bed Mobility    Supine to Sit Independent  --    Sit to Supine Independent  --    Sit to Stand Independent  --    Scooting Independent  --    Rolling  Independent  --    Interdisciplinary Plan of Care Collaboration   IDT Collaboration with  Family / Caregiver;Occupational Therapist;Nursing Family / Caregiver   Patient Position at End of Therapy Seated;Phone within Reach;Family / Friend in Room;Call Light within Reach Seated;Family / Friend in Room   Collaboration Comments set up for Naseem at WC level in room, daughter present at end of tx and updated on fxl progress initiated FT         1st tx- assisted pt in donning R shoe with Xtern AFO in sitting maxA. Set up room and assessed mobility for Naseem at WC level. Provided description of equipment to order NBQC and potentially a bed rail prior to DC    2nd tx- completed floor recovery Genaro after demonstration. Initiated FT with pt's dtr Briseida for gait NBQC CGA with gait belt    Assessment    Pt has progressed to Naseem at WC level and has been cleared for bed mobility, transfers, and toileting in the room. Pt would benefit from continued intensive interdisplinary rehab to progress balance and independence with standing mobility prior to DC. Pt continues to demonstrate R ankle inversion/PF as well as decr knee flexion in swing phase of gait though it has significantly improved with use of Xtern AFO. Family training has been initiated with one pf pt's daughters and will continue with her son, daughter, and partner on 2/22-23.  Strengths: Able to follow instructions, Alert and oriented, Effective communication skills, Good carryover of learning, Good endurance, Independent prior level of function  Barriers: Generalized weakness, Hemiparesis, Impaired balance    Plan    FT for stairs, gait training, and car transfer. Gait training with bioness and treadmill    Passport items to be completed:  Get in/out of bed safely, in/out of a vehicle, safely use mobility device, walk or wheel around home/community, navigate up and down stairs, show how to get up/down from the ground, ensure home is accessible, demonstrate HEP, complete  caregiver training    Physical Therapy Problems (Active)       Problem: Mobility Transfers       Dates: Start: 02/07/23         Goal: STG-Within one week, patient will perform bed mobility independently       Dates: Start: 02/07/23               Problem: PT-Long Term Goals       Dates: Start: 02/07/23         Goal: LTG-By discharge, patient will propel wheelchair 150ft [place]       Dates: Start: 02/07/23            Goal: LTG-By discharge, patient will ambulate 150ft with LRAD and CGA       Dates: Start: 02/07/23            Goal: LTG-By discharge, patient will transfer one surface to another [place]       Dates: Start: 02/07/23            Goal: LTG-By discharge, patient will perform home exercise program independently       Dates: Start: 02/07/23            Goal: LTG-By discharge, patient will ambulate up/down 1 curb step with [place]       Dates: Start: 02/07/23

## 2023-02-22 NOTE — DISCHARGE PLANNING
Updated clinicals faxed to [employer] to request 3 more days.  Awaiting response.      2/23/23:  rec'd fax approving days through 2/23/23.  Patient DC's tomorrow.

## 2023-02-22 NOTE — CARE PLAN
Problem: Bathing  Goal: STG-Within one week, patient will bathe w/ supervision.   Outcome: Not Met  Note: Pt requires SBA for bathing      Problem: IADL's  Goal: STG-Within one week, patient will access kitchen area w/ min A using LRD.   Outcome: Not Met  Note: Not yet addressed     Problem: Toileting  Goal: STG-Within one week, patient will complete toileting tasks w/ min.  Outcome: Met     Problem: Dressing  Goal: STG-Within one week, patient will retrieve clothing and dress w/ SBA.   Outcome: Met     Problem: Functional Transfers  Goal: STG-Within one week, patient will transfer to toilet w/ supervision.   Outcome: Met  Goal: STG-Within one week, patient will transfer to tub/shower  Description: W/ SBA to simulate home set up.  Outcome: Met

## 2023-02-22 NOTE — CARE PLAN
Problem: Knowledge Deficit - Standard  Goal: Patient and family/care givers will demonstrate understanding of plan of care, disease process/condition, diagnostic tests and medications  Outcome: Progressing  Note: Pt agrees with plan of care tonight regarding medications and safety.  Will continue to monitor patient.      Problem: Fall Risk - Rehab  Goal: Patient will remain free from falls  Outcome: Progressing  Note: Karime Olivarez Fall risk Assessment Score: 9        Low fall risk interventions   - Call light within reach   - Yellow  socks   - Belongings within reach   - Bed in the lowest position          The patient is Stable - Low risk of patient condition declining or worsening    Shift Goals  Clinical Goals: Safety  Patient Goals: Sleep well    Progress made toward(s) clinical / shift goals:  progressing

## 2023-02-22 NOTE — PROGRESS NOTES
"Rehab Progress Note     Date of Service: 2/22/2023  Chief Complaint: follow up stroke    Interval Events (Subjective)      Patient seen and examined today in the therapy gym.  Her son and one of her daughters are present.  Patient has been made modified independent in her room.  Reviewed her blood pressure medications patient denies any pain.  She continues to improve her function in her right upper extremity though still takes time to feed herself using the right hand.    Patient has no other new questions, concerns, or complaints today.     ROS: No changes to bowel, bladder, pain, mood, or sleep.         Objective:  VITAL SIGNS: /77   Pulse 64   Temp 36.7 °C (98.1 °F) (Oral)   Resp 18   Ht 1.676 m (5' 6\")   Wt 79.4 kg (175 lb)   LMP  (LMP Unknown)   SpO2 94%   BMI 28.25 kg/m²   Gen: alert, no apparent distress  CV: Regular rate, regular rhythm  Resp: Clear to auscultation bilaterally  Neuro: Right hemiparesis, right hemianesthesia      No results found for this or any previous visit (from the past 72 hour(s)).      Scheduled Medications   Medication Dose Frequency    metoprolol tartrate  25 mg TWICE DAILY    baclofen  5 mg TID    hydrALAZINE  75 mg Q8HRS    senna-docusate  2 Tablet BID    And    polyethylene glycol/lytes  1 Packet DAILY    losartan  100 mg Q DAY    atorvastatin  80 mg Q EVENING    amLODIPine  10 mg Q DAY    enoxaparin (LOVENOX) injection  40 mg DAILY AT 1800       Current Diet Order   Procedures    Diet Order Diet: Regular (Meds one at a time with liquid wash, larger pills in applesauce)       Assessment:    This patient is a 57 y.o. female admitted for acute inpatient rehabilitation with Hemorrhagic stroke (HCC).    I led and attended the weekly conference, and agree with the IDT conference documentation and plan of care as noted below.    Date of conference: 2/22/2023    Goals and barriers: See IDT note.    Biggest barriers: Right hemianesthesia, right hemiparesis, mild " spasticity    Goals in next week: Family training, discharge    CM/social support: Multiple family members supportive and will assist at discharge    Anticipated DC date: 2/24    Home health: PT/OT/RN    Equip: Manual wheelchair    Follow up: [facility], Dr. De La Torre    Rx: meds to beds      Problem List/Medical Decision Making and Plan:    Left basal hemorrhagic ganglia stroke  From uncontrolled hypertension  Right hemiparesis, improved  Right sarahy-anesthesia, improved  Cognitive impairment, improved  Dysphagia, resolved  Cognitive impairment, improved  Impulsive, improved  Mild expressive aphasia/anomia, improved  Visual impairment, improved  Double vision, improved  Right neglect, improved  Continue therapies    Neuropathic pain, resolved  In the right arm  Was likely secondary to spasticity, off gabapentin    Spasticity  Continue baclofen  Outpatient follow-up with Dr. 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  Physical Medicine and Rehabilitation

## 2023-02-22 NOTE — CARE PLAN
Problem: PT-Long Term Goals  Goal: LTG-By discharge, patient will perform home exercise program independently  Outcome: Progressing     Problem: Mobility Transfers  Goal: STG-Within one week, patient will perform bed mobility independently  Outcome: Met     Problem: PT-Long Term Goals  Goal: LTG-By discharge, patient will propel wheelchair 150ft [place]  Outcome: Met  Goal: LTG-By discharge, patient will ambulate 150ft with LRAD and CGA  Outcome: Met  Goal: LTG-By discharge, patient will transfer one surface to another [place]  Outcome: Met  Goal: LTG-By discharge, patient will ambulate up/down 1 curb step with [place]  Outcome: Met

## 2023-02-22 NOTE — THERAPY
Physical Therapy   Daily Treatment     Patient Name: Anali Pan  Age:  57 y.o., Sex:  female  Medical Record #: [id number]  Today's Date: 2/22/2023     Precautions  Precautions: Fall Risk  Comments: R hemiplegia, absent R UE/LE light touch sensation, mod I in room w/ w/c    Subjective    Pt and family agreeable to family training.   Pt reporting anterior knee pain after Bioness gait training      Objective       02/22/23 1001 02/22/23 1430   PT Charge Group   PT Gait Training (Units) 1 2   PT Neuromuscular Re-Education / Balance (Units)  --  2   PT Therapeutic Activities (Units) 3  --    PT Total Time Spent   PT Individual Total Time Spent (Mins) 60 60   Gait Functional Level of Assist    Gait Level Of Assist Contact Guard Assist Minimal Assist   Assistive Device Quad Cane Quad Cane   Distance (Feet) 80 45   # of Times Distance was Traveled 3 2   Deviation Decreased Base Of Support;Bradykinetic  (R hip internal rotation, 1 instance requiring [place] for LOB when turning with dtr assisting pt)   (slight R ankle inversion even with bioness for DF/eversion and hamstring to R LE)   Stairs Functional Level of Assist   Level of Assist with Stairs Minimal Assist Minimal Assist   # of Stairs Climbed 1  (6 inch curb step x3 bouts) 1   Stairs Description  --    (8inch step with rail to access treadmill)   Transfer Functional Level of Assist   Bed, Chair, Wheelchair Transfer  --  Modified Independent   Bed Chair Wheelchair Transfer Description  --  Squat pivot transfer to wheelchair   Bed Mobility    Sit to Stand  --  Contact Guard Assist   Interdisciplinary Plan of Care Collaboration   IDT Collaboration with  Physician;;Occupational Therapist;Family / Caregiver Physical Therapist;Family / Caregiver  (SPT)   Patient Position at End of Therapy Seated;Call Light within Reach;Family / Friend in Room Seated;Family / Friend in Room   Collaboration Comments FT with daughter Jennifer and son Margarita HERNANDEZ present during tx to  round on pt and discuss meds; CM provided resources for CG and disabled placard; transition of care with OT collaboration with PT and SPT for bioness and litegait over treadmill gait training     1st tx- Complete training with pt's son and daughter for gait NBQC and Xtern AFO, curb step NBQC and Xtern AFO, and car transfer stand pivot [place].    2nd tx- Trialed gait training with Bioness FES system for R ankle DF/eversion and R hamstring in swing phase of gait. Completed over ground and with treadmill and litegait set up. Pt completed 62ft in 1:40min at 0.5mph, 150ft in 2:10min 0.5-1.0 mph, and 169ft in 2:12 at 1.0mph. All treadmill training was completed 1 person facilitating R hip/knee flexion and R ankle DF/eversion and a second person facilitating weightshift.    Assessment    Pt's daughter and son demonstrated competency in assisting pt with household ambulation, curb step negotiation, and car transfers. Her partner will be present tomorrow to complete training.    Pt demonstrated improved ankle stability and decreased ankle inversion when ambulating with Xtern AFO compared to Bioness FES system. Pt would benefit from use of an Xtern AFO upon DC to facilitate independence with ambulation and reduce risk of ankle inversion injury.    Strengths: Able to follow instructions, Alert and oriented, Effective communication skills, Good carryover of learning, Good endurance, Independent prior level of function  Barriers: Generalized weakness, Hemiparesis, Impaired balance    Plan    FT with partner, continue gait training NBQC and Xtern AFO, complete passport, DC [facility] [place]        Physical Therapy Problems (Active)       Problem: PT-Long Term Goals       Dates: Start: 02/07/23         Goal: LTG-By discharge, patient will perform home exercise program independently       Dates: Start: 02/07/23

## 2023-02-23 ENCOUNTER — APPOINTMENT (OUTPATIENT)
Dept: OCCUPATIONAL THERAPY | Facility: REHABILITATION | Age: 58
DRG: 057 | End: 2023-02-23
Attending: PHYSICAL MEDICINE & REHABILITATION
Payer: COMMERCIAL

## 2023-02-23 ENCOUNTER — APPOINTMENT (OUTPATIENT)
Dept: PHYSICAL THERAPY | Facility: REHABILITATION | Age: 58
DRG: 057 | End: 2023-02-23
Attending: PHYSICAL MEDICINE & REHABILITATION
Payer: COMMERCIAL

## 2023-02-23 PROBLEM — E87.1 HYPONATREMIA: Status: RESOLVED | Noted: 2019-04-05 | Resolved: 2023-02-23

## 2023-02-23 PROBLEM — R25.2 SPASTICITY AS LATE EFFECT OF CEREBROVASCULAR ACCIDENT (CVA): Status: ACTIVE | Noted: 2023-02-23

## 2023-02-23 PROBLEM — I69.398 SPASTICITY AS LATE EFFECT OF CEREBROVASCULAR ACCIDENT (CVA): Status: ACTIVE | Noted: 2023-02-23

## 2023-02-23 PROCEDURE — 99232 SBSQ HOSP IP/OBS MODERATE 35: CPT | Performed by: PHYSICAL MEDICINE & REHABILITATION

## 2023-02-23 PROCEDURE — 770010 HCHG ROOM/CARE - REHAB SEMI PRIVAT*

## 2023-02-23 PROCEDURE — A9270 NON-COVERED ITEM OR SERVICE: HCPCS | Performed by: PHYSICAL MEDICINE & REHABILITATION

## 2023-02-23 PROCEDURE — RXMED WILLOW AMBULATORY MEDICATION CHARGE: Performed by: PHYSICAL MEDICINE & REHABILITATION

## 2023-02-23 PROCEDURE — 97530 THERAPEUTIC ACTIVITIES: CPT

## 2023-02-23 PROCEDURE — 97110 THERAPEUTIC EXERCISES: CPT

## 2023-02-23 PROCEDURE — 97112 NEUROMUSCULAR REEDUCATION: CPT

## 2023-02-23 PROCEDURE — 700102 HCHG RX REV CODE 250 W/ 637 OVERRIDE(OP): Performed by: PHYSICAL MEDICINE & REHABILITATION

## 2023-02-23 PROCEDURE — 97116 GAIT TRAINING THERAPY: CPT

## 2023-02-23 RX ORDER — BACLOFEN 5 MG/1
5 TABLET ORAL 3 TIMES DAILY
Qty: 90 TABLET | Refills: 0 | Status: SHIPPED | OUTPATIENT
Start: 2023-02-23 | End: 2023-03-23

## 2023-02-23 RX ORDER — HYDRALAZINE HYDROCHLORIDE 25 MG/1
75 TABLET, FILM COATED ORAL EVERY 8 HOURS
Qty: 270 TABLET | Refills: 0 | Status: SHIPPED | OUTPATIENT
Start: 2023-02-23 | End: 2023-03-23 | Stop reason: SDUPTHER

## 2023-02-23 RX ORDER — AMOXICILLIN 250 MG
2 CAPSULE ORAL 2 TIMES DAILY
Qty: 120 TABLET | Refills: 0 | COMMUNITY
Start: 2023-02-23 | End: 2023-03-06

## 2023-02-23 RX ORDER — ATORVASTATIN CALCIUM 80 MG/1
80 TABLET, FILM COATED ORAL EVERY EVENING
Qty: 30 TABLET | Refills: 0 | Status: SHIPPED | OUTPATIENT
Start: 2023-02-23 | End: 2023-03-13 | Stop reason: SDUPTHER

## 2023-02-23 RX ORDER — LOSARTAN POTASSIUM 100 MG/1
100 TABLET ORAL DAILY
Qty: 30 TABLET | Refills: 0 | Status: SHIPPED | OUTPATIENT
Start: 2023-02-24 | End: 2023-03-13 | Stop reason: SDUPTHER

## 2023-02-23 RX ORDER — AMLODIPINE BESYLATE 10 MG/1
10 TABLET ORAL DAILY
Qty: 30 TABLET | Refills: 0 | Status: SHIPPED | OUTPATIENT
Start: 2023-02-24 | End: 2023-03-23 | Stop reason: SDUPTHER

## 2023-02-23 RX ORDER — ACETAMINOPHEN 325 MG/1
650 TABLET ORAL EVERY 4 HOURS PRN
Qty: 30 TABLET | Refills: 0 | COMMUNITY
Start: 2023-02-23 | End: 2023-03-13 | Stop reason: SDUPTHER

## 2023-02-23 RX ADMIN — BACLOFEN 5 MG: 10 TABLET ORAL at 08:16

## 2023-02-23 RX ADMIN — METOPROLOL TARTRATE 25 MG: 25 TABLET, FILM COATED ORAL at 05:49

## 2023-02-23 RX ADMIN — HYDRALAZINE HYDROCHLORIDE 75 MG: 25 TABLET, FILM COATED ORAL at 14:41

## 2023-02-23 RX ADMIN — BACLOFEN 5 MG: 10 TABLET ORAL at 14:42

## 2023-02-23 RX ADMIN — METOPROLOL TARTRATE 25 MG: 25 TABLET, FILM COATED ORAL at 17:13

## 2023-02-23 RX ADMIN — HYDRALAZINE HYDROCHLORIDE 75 MG: 25 TABLET, FILM COATED ORAL at 05:49

## 2023-02-23 RX ADMIN — LOSARTAN POTASSIUM 100 MG: 25 TABLET, FILM COATED ORAL at 05:50

## 2023-02-23 RX ADMIN — BACLOFEN 5 MG: 10 TABLET ORAL at 22:05

## 2023-02-23 RX ADMIN — AMLODIPINE BESYLATE 10 MG: 5 TABLET ORAL at 05:48

## 2023-02-23 RX ADMIN — HYDRALAZINE HYDROCHLORIDE 75 MG: 25 TABLET, FILM COATED ORAL at 22:05

## 2023-02-23 RX ADMIN — ATORVASTATIN CALCIUM 80 MG: 40 TABLET, FILM COATED ORAL at 22:04

## 2023-02-23 ASSESSMENT — BRIEF INTERVIEW FOR MENTAL STATUS (BIMS)
BIMS SUMMARY SCORE: 15
ASKED TO RECALL BLUE: YES, NO CUE REQUIRED
INITIAL REPETITION OF BED BLUE SOCK - FIRST ATTEMPT: 3
WHAT DAY OF THE WEEK IS IT: CORRECT
ASKED TO RECALL BED: YES, NO CUE REQUIRED
WHAT MONTH IS IT: ACCURATE WITHIN 5 DAYS
WHAT YEAR IS IT: CORRECT
ASKED TO RECALL SOCK: YES, NO CUE REQUIRED

## 2023-02-23 ASSESSMENT — ACTIVITIES OF DAILY LIVING (ADL)
SHOWER_TRANSFER_LEVEL_OF_ASSIST: REQUIRES SUPERVISION WITH SHOWER TRANSFER
TOILET_TRANSFER_LEVEL_OF_ASSIST: ABLE TO COMPLETE TOILET TRANSFER WITHOUT ASSIST
BED_CHAIR_WHEELCHAIR_TRANSFER_DESCRIPTION: SQUAT PIVOT TRANSFER TO WHEELCHAIR
TOILETING_LEVEL_OF_ASSIST: ABLE TO COMPLETE TOILETING WITHOUT ASSIST

## 2023-02-23 ASSESSMENT — GAIT ASSESSMENTS
GAIT LEVEL OF ASSIST: CONTACT GUARD ASSIST
DISTANCE (FEET): 50
ASSISTIVE DEVICE: QUAD CANE

## 2023-02-23 NOTE — CARE PLAN
The patient is Stable - Low risk of patient condition declining or worsening    Shift Goals  Clinical Goals: Safety  Patient Goals: Sleep well    Problem: Self Care  Goal: Patient will have the ability to perform ADLs independently or with assistance  Outcome: Progressing     Problem: Fall Risk - Rehab  Goal: Patient will remain free from falls  Outcome: Progressing

## 2023-02-23 NOTE — THERAPY
Occupational Therapy  Daily Treatment     Patient Name: Anali Pan  Age:  57 y.o., Sex:  female  Medical Record #: [id number]  Today's Date: 2/23/2023     Precautions  Precautions: (P) Fall Risk  Comments: (P) R hemiplegia, absent R UE/LE light touch sensation, mod I in room w/ w/c         Subjective  Pt and pt's SO participated in family training      Objective       02/23/23 0901   OT Charge Group   OT Therapy Activity (Units) 4   OT Total Time Spent   OT Individual Total Time Spent (Mins) 60   Precautions   Precautions Fall Risk   Comments R hemiplegia, absent R UE/LE light touch sensation, mod I in room w/ w/c   Strength Upper Body   Right  Impaired  (5#, 5#, 4#)   Right Lateral Pinch Impaired  (4#, 3#, 4#)   Functional Level of Assist   Lower Body Dressing Minimal Assist  (A w/ R shoe)   Lower Body Dressing Description Increased time;Initial preparation for task   Sitting Upper Body Exercises   Chest Press 2 sets of 10;Bilateral  (w/ standard chair)   Fine Motor / Dexterity    Fine Motor / Dexterity Yes   Fine Motor / Dexterity Interventions beading  (Pt grasped large wooden beads w/ RUE and threaded them onto string w/ LUE. Pt required increased time for problem solving and grasp to complete activity.)   Interdisciplinary Plan of Care Collaboration   IDT Collaboration with  Family / Caregiver;Physician;Physical Therapist   Patient Position at End of Therapy Seated;Tray Table within Reach;Call Light within Reach;Phone within Reach   Collaboration Comments family training; transition of care     Family training w/ pt's SO to address d/c planning and pt's level of function. Pt's So provided pictures of shower seat, GB in shower, and new GB by toilet. Pt's bathroom appears to be accessible from w/c level and safe for d/c tomorrow. Pt's So was educated on importance of RUE exercises, mirror therapy, and general neuro-muscular reeducation. All of pt's questions were either answered or referred to physician  and PT.  Pt and pt's SO report they are ready for d/c tomorrow and feel very comfortable with d/c plan.     Pt given elastic shoe laces to increase independence with LB dressing.     Assessment  Pt and Pt's SO demonstrate good understanding of home safety and importance of cont therapy post d/c. Pt's So reports he will be staying with her for the first week and on and off after that. Pt is very excited to d/c tomorrow and no further questions. Pt and pt's SO were educated to call or email with any questions that arise at home.   Strengths: Able to follow instructions, Alert and oriented, Effective communication skills, Good insight into deficits/needs, Independent prior level of function, Making steady progress towards goals, Manages pain appropriately, Motivated for self care and independence, Pleasant and cooperative, Supportive family, Willingly participates in therapeutic activities  Barriers: Decreased endurance, Impaired activity tolerance, Impaired balance, Limited mobility, Hemiparesis    Plan    D/c home tomorrow     Occupational Therapy Goals (Active)       Problem: Bathing       Dates: Start: 02/15/23         Goal: STG-Within one week, patient will bathe w/ supervision.        Dates: Start: 02/15/23         Goal Note filed on 02/22/23 1133 by Kavita Page OT       Pt requires SBA for bathing                  Problem: IADL's       Dates: Start: 02/15/23         Goal: STG-Within one week, patient will access kitchen area w/ min A using LRD.        Dates: Start: 02/15/23         Goal Note filed on 02/22/23 1133 by Kavita Page OT       Not yet addressed                 Problem: OT Long Term Goals       Dates: Start: 02/07/23         Goal: LTG-By discharge, patient will complete basic self care tasks w/ mod I - min A.       Dates: Start: 02/07/23            Goal: LTG-By discharge, patient will perform bathroom transfers w/ mod I - min A.       Dates: Start: 02/07/23

## 2023-02-23 NOTE — THERAPY
Occupational Therapy  Daily Treatment     Patient Name: Anali Pan  Age:  57 y.o., Sex:  female  Medical Record #: [id number]  Today's Date: 2/23/2023     Precautions  Precautions: (P) Fall Risk  Comments: (P) R hemiplegia, absent R UE/LE light touch sensation, mod I in room w/ w/c         Subjective    Pt agreeable to last OT session     Objective       02/23/23 1401   OT Charge Group   OT Neuromuscular Re-education / Balance (Units) 2   OT Total Time Spent   OT Individual Total Time Spent (Mins) 30   Precautions   Precautions Fall Risk   Comments R hemiplegia, absent R UE/LE light touch sensation, mod I in room w/ w/c   Neuro-Muscular Treatments   Neuro-Muscular Treatments   (Mirror Therapy)   Interdisciplinary Plan of Care Collaboration   Patient Position at End of Therapy Seated;Phone within Reach;Tray Table within Reach;Call Light within Reach       Pt completed mirror therapy for ~20 min to address RUE function. Pt was given handout to cont mirror therapy at home.    Pt was educated on slip on shoes (Kiziks).  Assessment    Pt has demonstrated increased independence in all areas of ADLs and IADLs since eval. Pt is ready for d/c home tomorrow.   Strengths: Able to follow instructions, Alert and oriented, Effective communication skills, Good insight into deficits/needs, Independent prior level of function, Making steady progress towards goals, Manages pain appropriately, Motivated for self care and independence, Pleasant and cooperative, Supportive family, Willingly participates in therapeutic activities  Barriers: Decreased endurance, Impaired activity tolerance, Impaired balance, Limited mobility, Hemiparesis    Plan    D/c home tomorrow    Occupational Therapy Goals (Active)       Problem: Bathing       Dates: Start: 02/15/23         Goal: STG-Within one week, patient will bathe w/ supervision.        Dates: Start: 02/15/23         Goal Note filed on 02/22/23 1133 by Kavita Page OT       Pt requires  SBA for bathing                  Problem: IADL's       Dates: Start: 02/15/23         Goal: STG-Within one week, patient will access kitchen area w/ min A using LRD.        Dates: Start: 02/15/23         Goal Note filed on 02/22/23 1133 by Kavita Page, OT       Not yet addressed                 Problem: OT Long Term Goals       Dates: Start: 02/07/23         Goal: LTG-By discharge, patient will complete basic self care tasks w/ mod I - min A.       Dates: Start: 02/07/23            Goal: LTG-By discharge, patient will perform bathroom transfers w/ mod I - min A.       Dates: Start: 02/07/23

## 2023-02-23 NOTE — THERAPY
Physical Therapy   Daily Treatment     Patient Name: Anali Pan  Age:  57 y.o., Sex:  female  Medical Record #: [id number]  Today's Date: 2/23/2023     Precautions  Precautions: Fall Risk  Comments: R hemiplegia, absent R UE/LE light touch sensation, mod I in room w/ w/c    Subjective    Pt eager to DC home with family to assist     Objective       02/23/23 1001   PT Charge Group   PT Gait Training (Units) 1   PT Therapeutic Activities (Units) 3   PT Total Time Spent   PT Individual Total Time Spent (Mins) 60   Gait Functional Level of Assist    Gait Level Of Assist Contact Guard Assist   Assistive Device Quad Cane   Distance (Feet) 50   # of Times Distance was Traveled 2   Deviation   (R hip internal rotation, VC's for incr R hip/knee flexion and weight shift left in swing)   Wheelchair Functional Level of Assist   Wheelchair Assist Modified Independent   Distance Wheelchair (Feet or Distance) 150   Stairs Functional Level of Assist   Level of Assist with Stairs Minimal Assist   # of Stairs Climbed 4   Stairs Description Extra time;Assist device/equipment;Supervision for safety;Verbal cueing   Transfer Functional Level of Assist   Bed, Chair, Wheelchair Transfer Modified Independent   Bed Chair Wheelchair Transfer Description Squat pivot transfer to wheelchair  ([place] stand pivot car transfer into [place])   Bed Mobility    Sit to Stand Independent   Interdisciplinary Plan of Care Collaboration   IDT Collaboration with  Family / Caregiver;Occupational Therapist   Patient Position at End of Therapy Seated;Family / Friend in Room   Collaboration Comments transition of care; partner, Zana, present for hands on training for standing mobility     Completed FT with pt's partner, Zana, for gait NBQC, curb step NBQC, and car transfer. Discussed benefits of recumbant bike or stationary pedals for cardiovascular endurance. Recommended frequent short distance ambulation with assistance to conserve energy and improve stability  with gait. Referred to [facility] for pt to perform independence. Recommended creating a schedule to help ease transition to home and keep pt addressing impairments independently       02/23/23 1301   PT Charge Group   PT Therapeutic Exercise (Units) 2   PT Total Time Spent   PT Individual Total Time Spent (Mins) 30     Completed dynamic standing balance to ring gong for graduation. Reviewed 1 set of each exercise in HEP, see below.                Assessment    Pt has made excellent progress while admitted and met 5/5 long term goals. She has progressed to [place] at the WC level and CGA-[place] for standing mobility with NBQC. An Xtern AFO has been ordered for pt through [employer]. Pt would benefit from HHPT to continue to address impairments and optimize independence with standing fxl mobility within the home.  Strengths: Able to follow instructions, Alert and oriented, Effective communication skills, Good carryover of learning, Good endurance, Independent prior level of function  Barriers: Generalized weakness, Hemiparesis, Impaired balance    Plan    DC, retrieve RR Xtern AFO from pt's shoe    Passport items to be completed:  complete    Physical Therapy Problems (Active)       Problem: PT-Long Term Goals       Dates: Start: 02/07/23         Goal: LTG-By discharge, patient will perform home exercise program independently       Dates: Start: 02/07/23

## 2023-02-23 NOTE — PROGRESS NOTES
Pt asleep the whole night after pm meds, requested door to be closed and lights turned off, done. Vital signs taken in the am prior to morning meds. Then pt complianed that the door was opened, explained to pt that it's vital signs  and med administration time. Inquired if she needed pain med, pt denies pain . Pt Mod I in the room. Cont monitored.

## 2023-02-23 NOTE — PROGRESS NOTES
"Rehab Progress Note     Date of Service: 2/23/2023  Chief Complaint: follow up stroke    Interval Events (Subjective)      Patient seen and examined today in the therapy gym.  She is getting family training with her significant other.  We discussed her medications and need to stay on her blood pressure medications for sure.  The only other 2 medication she is on is a statin for her cholesterol and baclofen for spasticity.  She is looking forward to her discharge home tomorrow.    Patient has no other new questions, concerns, or complaints today.     ROS: No changes to bowel, bladder, pain, mood, or sleep.         Objective:  VITAL SIGNS: /72   Pulse 72   Temp 36.4 °C (97.6 °F) (Oral)   Resp 18   Ht 1.676 m (5' 6\")   Wt 79.4 kg (175 lb)   LMP  (LMP Unknown)   SpO2 93%   BMI 28.25 kg/m²   Gen: alert, no apparent distress  CV: Regular rate, regular rhythm  Resp: Clear to auscultation bilaterally  Neuro: Right hemiparesis, right hemianesthesia, right-sided incoordination      No results found for this or any previous visit (from the past 72 hour(s)).      Scheduled Medications   Medication Dose Frequency    metoprolol tartrate  25 mg TWICE DAILY    baclofen  5 mg TID    hydrALAZINE  75 mg Q8HRS    senna-docusate  2 Tablet BID    And    polyethylene glycol/lytes  1 Packet DAILY    losartan  100 mg Q DAY    atorvastatin  80 mg Q EVENING    amLODIPine  10 mg Q DAY    enoxaparin (LOVENOX) injection  40 mg DAILY AT 1800       Current Diet Order   Procedures    Diet Order Diet: Regular (Meds one at a time with liquid wash, larger pills in applesauce)       Assessment:    This patient is a 57 y.o. female admitted for acute inpatient rehabilitation with Hemorrhagic stroke (HCC).    I led and attended the weekly conference, and agree with the IDT conference documentation and plan of care as noted below.    Date of conference: 2/22/2023    Goals and barriers: See IDT note.    Biggest barriers: Right hemianesthesia, " right hemiparesis, mild spasticity    Goals in next week: Family training, discharge    CM/social support: Multiple family members supportive and will assist at discharge    Anticipated DC date: 2/24    Home health: PT/OT/RN    Equip: Manual wheelchair    Follow up: [facility]CP, [facility], Dr. De La Torre    Rx: meds to beds      Problem List/Medical Decision Making and Plan:    Left basal hemorrhagic ganglia stroke  From uncontrolled hypertension  Right hemiparesis, improved  Right sarahy-anesthesia, improved  Cognitive impairment, improved  Dysphagia, resolved  Impulsive, resolved  Mild expressive aphasia/anomia, resolved  Visual impairment, improved  Double vision, resolved  Right neglect, resolved  Continue therapies    Neuropathic pain, resolved  In the right arm  Was likely secondary to spasticity, off gabapentin    Spasticity  Continue baclofen  Outpatient follow-up with Dr. 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  Physical Medicine and Rehabilitation

## 2023-02-23 NOTE — DISCHARGE PLANNING
DPA    Home health sent to [facility]. [facility] denied due to insurance. Referral now sent to [facility].     Dme sent to [facility].

## 2023-02-23 NOTE — DISCHARGE SUMMARY
"Admission Date: 2/6/2023    Discharge Date: 2/24/2023     Attending Provider: Bassem Mujica DO, for Valerie Brooke, MD    Admission Diagnosis:   Active Hospital Problems    Diagnosis     *Hemorrhagic stroke (HCC)     Hypertension     PFO (patent foramen ovale)     Alcohol use     Dyslipidemia     Hyponatremia        Discharge Diagnosis:  Active Hospital Problems    Diagnosis     *Hemorrhagic stroke (HCC)     Hypertension     PFO (patent foramen ovale)     Alcohol use     Dyslipidemia     Hyponatremia        HPI per H&P:    Per Dr. Marcum's consult, \"The patient is a 57 y.o.  female with a past medical history of hypertension noncompliant on antihypertensive medications and alcohol use of approximately 5-6 drinks per day;  who presented on 1/31/2023 11:17 PM as a transfer from [facility] with right-sided weakness.  Per documentation, patient had acute onset right-sided weakness and difficulty speaking, she arrived at the outside hospital with SBP up to 200.  A CT head was obtained which showed an acute left thalamic hemorrhage patient was transferred to [facility] for higher level of care.  Patient was admitted to the ICU with strict SBP goal of 100-1 40.  She was placed on a nicardipine drip for blood pressure control.  An MRI brain was obtained with evidence of an acute/subacute hemorrhage in the left thalamus adjacent to the parietal corona radiata with mass effect upon the posterior third ventricle with slight midline shift measuring 4 to 5 mm there is no evidence of abnormality relating to underlying lesion or abnormal vascularity.  Patient's hospital course has been complicated by onset of alcohol withdrawal, patient had poor participation with therapy due to anxiety and unwillingness to complete full commands for therapy session.  Patient is on a Precedex drip for agitation.  Echocardiogram was obtained with an EF of 70%, grade 1 diastolic dysfunction with evidence right right to left " "shift/PFO.\"     HgbA1c 5.1, .        Patient currently reports right sided weakness, abnormal sensation on the right, and blurry vision since her stroke. She denies any headache, or pain. She has had some urinary urgency, no dysuria, as well as mild constipation, though she hasn't been eating much. She does want to control her blood pressure and wants to stop drinking.      Patient was evaluated by Rehab Medicine physician and Physical Therapy, Occupational Therapy, and Speech Therapy and determined to be appropriate for acute inpatient rehab and was transferred to [facility] on 2/6/2023 12:16 PM.    Rehab Hospital Course:    Patient's impairments from her stroke included right hemiparesis, right hemianesthesia, cognitive impairment impulsivity, mild expressive aphasia, right neglect, visual impairment and double vision.  All of her symptoms improved with resolution of her dysphagia, impulsivity, aphasia/anomia, neglect and double vision.  On day of discharge she still continued to have right-sided hemiparesis as well as hemianesthesia and some impaired cognition with poor insight.    Patient developed spasticity and mild neuropathic pain on the right arm and leg.  She did not tolerate gabapentin.  She was started on baclofen low-dose which was increased to 10 mg 3 times a day which she did not tolerate due to feeling very sedated.  The dose was decreased back down to 5 mg 3 times daily.  She will need outpatient follow-up with Dr. De La Torre for continued management of her spasticity.  She is to continue on the statin for her hyperlipidemia and an LDL of 139.    Patient had significant hypertension upon admission.  She was treated with max dose amlodipine, losartan, increased dose of hydralazine.  She was titrated off clonidine patch and started on metoprolol.  She will need close follow-up with her primary care doctor as well as recording her daily blood pressures at home.    Patient will " need outpatient follow-up for the incidental finding of her PFO.  She was provided counseling for alcohol cessation.  She was treated with Lovenox for DVT prophylaxis and with Senokot for intermittent constipation.    Functional Status at Discharge  Eating:  Independent  Eating Description:  Increased time, Set-up of equipment or meal/tube feeding  Grooming:  Modified Independent, Seated  Grooming Description:  Seated in wheelchair at sink  Bathing:  Standby Assist  Bathing Description:  Grab bar, Hand held shower, Tub bench, Increased time, Supervision for safety  Upper Body Dressing:  Modified Independent  Upper Body Dressing Description:  Increased time  Lower Body Dressing:  Minimal Assist (A w/ R shoe)  Lower Body Dressing Description:  Increased time, Initial preparation for task     Walk:  Contact Guard Assist  Distance Walked:  50  Number of Times Distance Was Traveled:  2  Assistive Device:  Quad Cane  Gait Deviation:   (R hip internal rotation, VC's for incr R hip/knee flexion and weight shift left in swing)  Wheelchair:  Modified Independent  Distance Propelled:  150   Wheelchair Description:   (hemitechnique)  Stairs Minimal Assist  Stairs Description Extra time, Assist device/equipment, Supervision for safety, Verbal cueing     Comprehension:  Modified Independent  Comprehension Description:  Glasses, Contacts  Expression:  Independent  Expression Description:  Verbal cueing  Social Interaction:  Independent  Social Interaction Description:  Increased time, Schedule, Verbal cues  Problem Solving:  Modified Independent  Problem Solving Description:  Verbal cueing  Memory:  Modified Independent  Memory Description:  Verbal cueing       IVanessa M.D., personally performed a complete drug regimen review and no potential clinically significant medication issues were identified.     Discharge Medication:     Medication List        ASK your doctor about these medications        Instructions    amLODIPine 10 MG Tabs  Commonly known as: NORVASC   Take 1 Tablet by mouth every day.  Dose: 10 mg     atorvastatin 10 MG Tabs  Commonly known as: LIPITOR   Take 1 Tablet by mouth every evening.  Dose: 10 mg     cloNIDine 0.1 MG/24HR Ptwk patch  Commonly known as: CATAPRES   Place 1 Patch on the skin every 7 days.  Dose: 1 Patch     Fish Oil Oil   by Does not apply route.     folic acid 1 MG Tabs  Commonly known as: FOLVITE   Take 1 Tablet by mouth every day.  Dose: 1 mg     losartan 50 MG Tabs  Commonly known as: COZAAR   Take 1 Tablet by mouth every day.  Dose: 50 mg     multivitamin Tabs   Take 1 Tablet by mouth every day.  Dose: 1 Tablet     sodium chloride 1 GM Tabs  Commonly known as: SALT   Take 2 Tablets by mouth 3 times a day with meals.  Dose: 2 g     thiamine 100 MG tablet  Commonly known as: THIAMINE   Take 1 Tablet by mouth every day.  Dose: 100 mg              Discharge Diet:  Regular    Discharge Activity:  Do not return to work or driving until cleared by a physician.         Disposition:  Patient to discharge home with family support and community resources.     Equipment:  Follow-up & Discharge Instructions:     Home health: PT/OT/RN     Equip: Manual wheelchair     Follow up: [facility]CP[facility], Dr. De La Torre     Condition on Discharge:  Good    More than 35 minutes was spent on discharging this patient, including face-to-face time, prescription management, and the dictation of this note.        Date of Service: 2/24/2023

## 2023-02-23 NOTE — CARE PLAN
Problem: Fall Risk - Rehab  Goal: Patient will remain free from falls  Outcome: Progressing  Note: Karime Olivarez Fall risk Assessment Score: 9    Low fall risk interventions   - Call light within reach   - Yellow  socks   - Belongings within reach   - Bed in the lowest position        Problem: Pain - Standard  Goal: Alleviation of pain or a reduction in pain to the patient’s comfort goal  Outcome: Progressing  Note: Assessed for pain and discomfort, denies pain, needs anticipated and attended.   The patient is Stable - Low risk of patient condition declining or worsening    Shift Goals  Clinical Goals: Safety  Patient Goals: Sleep well    Progress made toward(s) clinical / shift goals:  Pt free from fall and injury.

## 2023-02-23 NOTE — CARE PLAN
Problem: OT Long Term Goals  Goal: LTG-By discharge, patient will complete basic self care tasks w/ mod I - min A.  Outcome: Met  Goal: LTG-By discharge, patient will perform bathroom transfers w/ mod I - min A.  Outcome: Met     Problem: Bathing  Goal: STG-Within one week, patient will bathe w/ supervision.   Outcome: Met     Problem: IADL's  Goal: STG-Within one week, patient will access kitchen area w/ min A using LRD.   Outcome: Discharged - Not Met     Problem: IADL's  Goal: STG-Within one week, patient will access kitchen area w/ min A using LRD.   Outcome: Discharged - Not Met

## 2023-02-23 NOTE — DISCHARGE INSTRUCTIONS
Physical Therapy Discharge Instructions for Anali Pan    2/23/2023    Level of Assist Required for Ambulation: Assist for Balance on Flat Surfaces, Physical Assist on Curbs, Physical Assist on Stairs  Device Recommended for Ambulation: Quad Cane  Level of Assist Required to Propel Wheelchair: Requires No Assist  Level of Assist Required for Transfers: Supervision  Device Recommended for Transfers: None  Home Exercise Program: Refer to Home Exercise Program Handout for Details    **It has been so great working with you and seeing how much progress you have made! You will continue to do great at home, just take it slow and steady. Please come show off in the future :) José Luis Ibarra, PT, DPT **      Occupational Therapy Discharge Instructions for Anali Pan    2/23/2023    Level of Assist Required for Eating: Able to Complete Eating without Assist  Level of Assist Required for Grooming: Able to Complete Grooming without Assist  Level of Assist Required for Dressing: Able to Complete Dressing without Assist  Level of Assist Required for Toileting: Able to Complete Toileting without Assist  Level of Assist Required for Toilet Transfer: Able to Complete Toilet Transfer without Assist  Level of Assist Required for Bathing: Requires Supervision with Bathing  Level of Assist Required for Shower Transfer: Requires Supervision with Shower Transfer  Level of Assist Required for Home Mgmt: Requires Supervision with Home Management  Level of Assist Required for Meal Prep: Requires Supervision with Meal Preparation  Driving: Please Contact Physician Prior to Driving  Home Exercise Program: Refer to Home Exercise Program Handout for Details    ** I t has been wonderful to work with you! Please send us updates! We would love to hear from you! - Alla [email or web address]/MARY [email or web address]  Hemorrhagic Stroke    A hemorrhagic stroke is the sudden death of brain tissue that occurs when a blood vessel in the brain leaks or  bursts (ruptures), causing bleeding in or around the brain(hemorrhage). When this happens, areas of the brain do not get enough oxygen, and blood builds up and presses on areas of the brain. Lack of oxygen and pressure from hemorrhaging can lead to brain damage and death.  There are two major types of hemorrhagic stroke:  Intracerebral hemorrhage. This happens if bleeding occurs within the brain tissue.  Subarachnoid hemorrhage. This happens if bleeding occurs in the area between the brain and the membrane that covers the brain (subarachnoid space).  Hemorrhagic stroke is a medical emergency. It can cause temporary or permanent brain damage and loss of brain function.  What are the causes?  This condition is caused by a blood vessel leaking or rupturing, which may be the result of:  Part of a weakened blood vessel wall bulging or ballooning out (cerebral aneurysm).  A hardened, thin blood vessel cracking open and allowing blood to leak out. Blood vessels may become hardened and thin due to plaque buildup.  Tangled blood vessels in the brain (brain arteriovenous malformation).  Protein buildup in artery walls in the brain (amyloid angiopathy).  Inflamed blood vessels (vasculitis).  A tumor in the brain.  High blood pressure (hypertension).  What increases the risk?  The following factors may make you more likely to develop this condition:  Hypertension.  Having abnormal blood vessels present since birth (congenital abnormality).  Bleeding disorders, such as hemophilia, sickle cell disease, or liver disease.  The blood becoming too thin while taking blood thinners (anticoagulants).  Aging.  Moderate or heavy alcohol use.  Using drugs, such as cocaine or methamphetamines.  What are the signs or symptoms?  Symptoms of this condition usually appear suddenly, and may include:  Weakness or numbness of the face, arm, or leg, especially on one side of the body.  Confusion.  Difficulty speaking (aphasia) or understanding  speech.  Difficulty seeing out of one or both eyes.  Difficulty walking or moving the arms or legs.  Dizziness.  Loss of balance or coordination.  Seizures.  A severe headache with no known cause. This headache may feel like the worst headache a person has ever experienced.  How is this diagnosed?  This condition may be diagnosed based on:  Your symptoms.  Your medical history.  A physical exam.  Tests, including:  Blood tests.  CT scan.  MRI.  CT angiography (CTA) or magnetic resonance angiography (MRA).  Catheter angiogram. In this procedure, dye is injected through a long, thin tube (catheter) into one of your arteries. Then, X-rays are taken. The X-rays will show whether there is a blockage or a problem in a blood vessel.  How is this treated?  This condition is a medical emergency that must be treated in a hospital immediately. The goals of treatment are to stop bleeding, reduce pressure on the brain, relieve symptoms, and prevent complications. Treatment for this condition may include:  Medicines that:  Lower blood pressure (antihypertensives).  Relieve pain (analgesics).  Relieve nausea or vomiting.  Stop or prevent seizures (anticonvulsants).  Relieve fever.  Prevent blood vessels in the brain from spasming in response to bleeding.  Control bleeding in the brain.  Assisted breathing (ventilation). This involves using a machine to help you breathe (ventilator).  Receiving donated blood products through an IV (transfusion). You will receive cells that help your blood clot.  Placement of a tube (shunt) in the brain to relieve pressure.  Physical, speech, or occupational therapy.  Surgery to stop bleeding, remove a blood clot or tumor, or reduce pressure.  Treatment depends on the cause, severity, and duration of symptoms. Medicines and changes to your diet may be used to help treat and manage risk factors for stroke, such as diabetes and high blood pressure. Recovery from hemorrhagic stroke varies widely. Talk  with your health care provider about what to expect during your recovery.  Follow these instructions at home:  Activity  Use a walker or a cane as told by your health care provider.  Return to your normal activities as told by your health care provider. Ask your health care provider what activities are safe for you.  Rest. Rest helps the brain to heal. Make sure you:  Get plenty of sleep. Avoid staying up late at night.  Keep a consistent sleep schedule. Try to go to sleep and wake up at about the same time every day.  Avoid activities that cause physical or mental stress.  Lifestyle  Do not drink alcohol if:  Your health care provider tells you not to drink.  You are pregnant, may be pregnant, or are planning to become pregnant.  If you drink alcohol:  Limit how much you use to:  0-1 drink a day for women.  0-2 drinks a day for men.  Be aware of how much alcohol is in your drink. In the [place] one drink equals one 12 oz bottle of beer (355 mL), one 5 oz glass of wine (148 mL), or one 1½ oz glass of hard liquor (44 mL).  General instructions  Do not drive or use heavy machinery until your health care provider approves.  Take over-the-counter and prescription medicines only as told by your health care provider.  Keep all follow-up visits as told by your health care provider, including visits with therapists. This is important.  How is this prevented?  Your risk of stroke can be decreased by working with your health care provider to treat:  High blood pressure.  High cholesterol.  Diabetes.  Heart disease.  Obesity.  Your risk of stroke can also be decreased by quitting smoking, limiting alcohol, and staying physically active. If you take the blood thinner warfarin, have your bloodwork monitored frequently by your health care provider.  Contact a health care provider if:  You develop any of the following symptoms:  Headaches that keep coming back (chronic headaches).  Nausea.  Vision problems.  Increased sensitivity to  "noise or light.  Depression or mood swings.  Anxiety or irritability.  Memory problems.  Difficulty concentrating or paying attention.  Sleep problems.  Feeling tired all of the time.  Get help right away if you:  Have a partial or total loss of consciousness.  Are taking blood thinners and you fall or you experience minor injury to the head.  Have a bleeding disorder and you fall or you experience minor trauma to the head.  Have any symptoms of a stroke. \"BE FAST\" is an easy way to remember the main warning signs of a stroke:  B - Balance. Signs are dizziness, sudden trouble walking, or loss of balance.  E - Eyes. Signs are trouble seeing or a sudden change in vision.  F - Face. Signs are sudden weakness or numbness of the face, or the face or eyelid drooping on one side.  A - Arms. Signs are weakness or numbness in an arm. This happens suddenly and usually on one side of the body.  S - Speech. Signs are sudden trouble speaking, slurred speech, or trouble understanding what people say.  T - Time. Time to call emergency services. Write down what time symptoms started.  Have other signs of a stroke, such as:  A sudden, severe headache with no known cause.  Nausea or vomiting.  Seizure.  These symptoms may represent a serious problem that is an emergency. Do not wait to see if the symptoms will go away. Get medical help right away. Call your local emergency services (911 in the [place] Do not drive yourself to the hospital.  Summary  Hemorrhagic stroke is a medical emergency.  Hemorrhagic stroke is caused by bleeding in or around the brain.  Know the signs and symptoms of stroke.  Know your stroke risk factors. Work with your health care provider to decrease your risk of stroke.  This information is not intended to replace advice given to you by your health care provider. Make sure you discuss any questions you have with your health care provider.  Document Released: 06/07/2011 Document Revised: 01/23/2020 Document " Reviewed: 01/24/2020  Elsevier Patient Education © 2020 [employer]

## 2023-02-24 ENCOUNTER — PHARMACY VISIT (OUTPATIENT)
Dept: PHARMACY | Facility: MEDICAL CENTER | Age: 58
End: 2023-02-24
Payer: COMMERCIAL

## 2023-02-24 ENCOUNTER — APPOINTMENT (OUTPATIENT)
Dept: OCCUPATIONAL THERAPY | Facility: REHABILITATION | Age: 58
DRG: 057 | End: 2023-02-24
Attending: PHYSICAL MEDICINE & REHABILITATION
Payer: COMMERCIAL

## 2023-02-24 VITALS
OXYGEN SATURATION: 95 % | HEART RATE: 68 BPM | DIASTOLIC BLOOD PRESSURE: 80 MMHG | TEMPERATURE: 99 F | HEIGHT: 66 IN | WEIGHT: 175 LBS | SYSTOLIC BLOOD PRESSURE: 133 MMHG | RESPIRATION RATE: 18 BRPM | BODY MASS INDEX: 28.12 KG/M2

## 2023-02-24 PROCEDURE — 700102 HCHG RX REV CODE 250 W/ 637 OVERRIDE(OP): Performed by: PHYSICAL MEDICINE & REHABILITATION

## 2023-02-24 PROCEDURE — 99239 HOSP IP/OBS DSCHRG MGMT >30: CPT | Performed by: PHYSICAL MEDICINE & REHABILITATION

## 2023-02-24 PROCEDURE — A9270 NON-COVERED ITEM OR SERVICE: HCPCS | Performed by: PHYSICAL MEDICINE & REHABILITATION

## 2023-02-24 RX ADMIN — BACLOFEN 5 MG: 10 TABLET ORAL at 08:19

## 2023-02-24 RX ADMIN — HYDRALAZINE HYDROCHLORIDE 75 MG: 25 TABLET, FILM COATED ORAL at 05:54

## 2023-02-24 RX ADMIN — AMLODIPINE BESYLATE 10 MG: 5 TABLET ORAL at 05:53

## 2023-02-24 RX ADMIN — LOSARTAN POTASSIUM 100 MG: 25 TABLET, FILM COATED ORAL at 05:53

## 2023-02-24 RX ADMIN — METOPROLOL TARTRATE 25 MG: 25 TABLET, FILM COATED ORAL at 05:53

## 2023-02-24 NOTE — CARE PLAN
Problem: Discharge Barriers/Planning  Goal: Patient's continuum of care needs are met  Flowsheets (Taken 2/24/2023 8078)  Continuum of Care Needs:   Assessed for discharge barriers   Communicated discharge barriers to interdisciplinary tream   Involved patient/family/support system in discharge process   Collaborated with Case Management/   Provided and explained discharge instructions     Problem: Fall Risk - Rehab  Goal: Patient will remain free from falls  Note: Patient remains free from falls this shift. Patient was educated on using the call light to prevent falls. Patients bed is in the lowest position. The patients belongings are placed in near proximity to the patient. Patient is modified independent this shift.    The patient is Stable - Low risk of patient condition declining or worsening

## 2023-02-24 NOTE — PROGRESS NOTES
Patient discharged to home per order.  Discharge instructions reviewed with patient and ; they verbalize understanding and signed copies placed in chart.  Patient has all belongings; signed copy of form in chart.  Patient left facility at 1055 via wheelchair accompanied by rehab staff and .  Have enjoyed working with this pleasant patient.

## 2023-02-24 NOTE — CARE PLAN
Problem: Fall Risk - Rehab  Goal: Patient will remain free from falls  Outcome: Progressing  Note: Karime Olivarez Fall risk Assessment Score: 7    Low fall risk interventions   - Call light within reach   - Yellow  socks   - Belongings within reach   - Bed in the lowest position        Problem: Pain - Standard  Goal: Alleviation of pain or a reduction in pain to the patient’s comfort goal  Outcome: Progressing  Note: Assessed for pain and discomfort , denies pain, under control . Pt MOD 1 Iin the room.   The patient is Stable - Low risk of patient condition declining or worsening    Shift Goals  Clinical Goals: Safety  Patient Goals: Sleep well    Progress made toward(s) clinical / shift goals:  Pt free from fall and injury.

## 2023-04-04 DIAGNOSIS — I61.9 HEMORRHAGIC STROKE (HCC): ICD-10-CM

## 2023-04-10 ENCOUNTER — TELEMEDICINE (OUTPATIENT)
Dept: PHYSICAL MEDICINE AND REHAB | Facility: MEDICAL CENTER | Age: 58
End: 2023-04-10
Payer: COMMERCIAL

## 2023-04-10 DIAGNOSIS — Z74.09 IMPAIRED MOBILITY AND ENDURANCE: ICD-10-CM

## 2023-04-10 DIAGNOSIS — Z99.89 AMBULATES WITH CANE: ICD-10-CM

## 2023-04-10 DIAGNOSIS — G81.91 RIGHT HEMIPARESIS (HCC): ICD-10-CM

## 2023-04-10 DIAGNOSIS — Z78.9 IMPAIRED MOBILITY AND ACTIVITIES OF DAILY LIVING: ICD-10-CM

## 2023-04-10 DIAGNOSIS — I61.9 HEMORRHAGIC STROKE (HCC): Primary | ICD-10-CM

## 2023-04-10 DIAGNOSIS — Z74.09 IMPAIRED MOBILITY AND ACTIVITIES OF DAILY LIVING: ICD-10-CM

## 2023-04-10 PROCEDURE — 99215 OFFICE O/P EST HI 40 MIN: CPT | Mod: 95 | Performed by: PHYSICAL MEDICINE & REHABILITATION

## 2023-04-10 NOTE — PROGRESS NOTES
[facility]  [facility]  [address] [place] [address]  Ph: [phone]    NEW PATIENT EVALUATION    This evaluation was conducted via Zoom using secure and encrypted videoconferencing technology. The patient was in a private location in their home in the [facility]. The patient's identity was confirmed and verbal consent was obtained for this virtual visit.    *Patient established in PM&R practice, however, patient new to writer as patient is transferring care. Therefore, patient billed as established.     Patient Name: Anali Pan   Patient : 1965  Patient Age: 57 y.o.     Examining Physician: Dr. Larisa De La Torre, DO    PM&R History to Date - Background Information:  Dates of Admission/Discharge to ARU: 23-23    SUBJECTIVE:   Patient Identification: Anali Pan is a 57 y.o. female with PMH significant for hypertension noncompliant on antihypertensive medications and alcohol use of approximately 5-6 drinks per day and rehabilitation history significant for left basal ganglia hemorrhagic stroke secondary to uncontrolled HTN 23 and is presenting to PM&R clinic for a NEW OUTPATIENT evaluation with the following chief complaint/s:    Chief Complaint: ARU Discharge Follow Up    History of Present Illness:      Records reviewed: Acute rehab discharge summary reviewed in its entirety.    She has progressed to using cane from wheelchair. She is getting PT and OT at the house. She is now going to go to OP therapy. She has abnormal sensation on her R side. It is sometimes painful but not always. Stopped the Baclofen about a month ago. She gets a lot of tightening on her shoulder area. She tried to put weight on her foot and her foot wants to curl. Is using a single point cane. Has been massaging and using hear for her right side. She does try to walk on different surfaces. She does not have an AFO, was not recommended. Her ankle does get tired  at the end of the day. She sleeps really well. She does not take naps during the day. She does feel cognitively back to her baseline. She had been a . Has not returned to driving. Had blurry vision but it is improved. She has gotten better, but day to day she doesn't notice a big difference. Has not had any falls. Urinating well and has normal BM.     Review of Systems:  All other pertinent positive review of systems are noted above in HPI.   All other systems reviewed and are negative.    Past Medical History:  Past Medical History:   Diagnosis Date    Alcohol use     Hypertension       Past Surgical History:   Procedure Laterality Date    OVARIAN CYSTECTOMY          Current Outpatient Medications:     hydrALAZINE (APRESOLINE) 25 MG Tab, Take 3 Tablets by mouth every 8 hours., Disp: 270 Tablet, Rfl: 3    amLODIPine (NORVASC) 10 MG Tab, Take 1 Tablet by mouth every day., Disp: 90 Tablet, Rfl: 3    metoprolol tartrate (LOPRESSOR) 25 MG Tab, Take 1 Tablet by mouth 2 times a day., Disp: 60 Tablet, Rfl: 0    losartan (COZAAR) 100 MG Tab, Take 1 Tablet by mouth every day., Disp: 30 Tablet, Rfl: 0    acetaminophen (TYLENOL) 325 MG Tab, Take 2 Tablets by mouth every four hours as needed for Mild Pain., Disp: 30 Tablet, Rfl: 0  No Known Allergies     Past Social History:  Social History     Socioeconomic History    Marital status: Single     Spouse name: Not on file    Number of children: Not on file    Years of education: Not on file    Highest education level: Not on file   Occupational History    Not on file   Tobacco Use    Smoking status: Never    Smokeless tobacco: Never   Vaping Use    Vaping Use: Never used   Substance and Sexual Activity    Alcohol use: Yes     Alcohol/week: 21.0 oz     Types: 35 Glasses of wine per week     Comment: 1-2 drinks per day, 4-6 days a week    Drug use: No    Sexual activity: Not Currently   Other Topics Concern    Not on file   Social History Narrative    . 3 kids. One  at home. Self employed     Social Determinants of Health     Financial Resource Strain: Not on file   Food Insecurity: Not on file   Transportation Needs: No Transportation Needs    Lack of Transportation (Medical): No    Lack of Transportation (Non-Medical): No   Physical Activity: Not on file   Stress: Not on file   Social Connections: Not on file   Intimate Partner Violence: Not on file   Housing Stability: Not on file        Family History:  Family History   Problem Relation Age of Onset    Breast Cancer Mother     Hypertension Mother     Thyroid Mother         Hyperthyoidism    Cancer Mother         Breast    Hypertension Father     Diabetes Father         Type 2    Hypertension Brother         Gout       Depression and Opioid Screening  PHQ-9:      2/19/2023     9:00 PM 2/20/2023     8:30 PM 2/24/2023     9:31 AM   Depression Screen (PHQ-2/PHQ-9)   PHQ-2 Total Score 0 0 0     Interpretation of PHQ-9 Total Score   Score Severity   1-4 No Depression   5-9 Mild Depression   10-14 Moderate Depression   15-19 Moderately Severe Depression   20-27 Severe Depression     OBJECTIVE:   Vital Signs:  There were no vitals filed for this visit.     Pertinent Labs:  Lab Results   Component Value Date/Time    SODIUM 135 03/13/2023 08:08 AM    SODIUM 138 02/16/2023 05:38 AM    POTASSIUM 4.2 03/13/2023 08:08 AM    POTASSIUM 3.6 02/07/2023 05:41 AM    CHLORIDE 100 03/13/2023 08:08 AM    CHLORIDE 103 02/07/2023 05:41 AM    CO2 21 03/13/2023 08:08 AM    CO2 21 02/07/2023 05:41 AM    GLUCOSE 90 03/13/2023 08:08 AM    GLUCOSE 95 02/07/2023 05:41 AM    BUN 14 03/13/2023 08:08 AM    BUN 16 02/07/2023 05:41 AM    CREATININE 0.62 03/13/2023 08:08 AM    CREATININE 0.54 02/07/2023 05:41 AM    BUNCREATRAT 23 03/13/2023 08:08 AM    GLOMRATE 85 01/31/2023 08:55 PM       Lab Results   Component Value Date/Time    HBA1C 5.1 02/01/2023 12:40 AM       Lab Results   Component Value Date/Time    WBC 5.7 03/13/2023 08:08 AM    WBC 6.3 02/16/2023  09:28 AM    RBC 4.10 03/13/2023 08:08 AM    RBC 4.12 (L) 02/16/2023 09:28 AM    HEMOGLOBIN 12.7 03/13/2023 08:08 AM    HEMOGLOBIN 13.1 02/16/2023 09:28 AM    HEMATOCRIT 37.5 03/13/2023 08:08 AM    HEMATOCRIT 37.5 02/16/2023 09:28 AM    MCV 92 03/13/2023 08:08 AM    MCV 91.0 02/16/2023 09:28 AM    MCH 31.0 03/13/2023 08:08 AM    MCH 31.8 02/16/2023 09:28 AM    MCHC 33.9 03/13/2023 08:08 AM    MCHC 34.9 02/16/2023 09:28 AM    MPV 9.2 02/16/2023 09:28 AM    NEUTSPOLYS 60.00 02/07/2023 05:41 AM    LYMPHOCYTES 20.30 (L) 02/07/2023 05:41 AM    MONOCYTES 11.80 02/07/2023 05:41 AM    EOSINOPHILS 6.10 02/07/2023 05:41 AM    BASOPHILS 1.50 02/07/2023 05:41 AM       Lab Results   Component Value Date/Time    ASTSGOT 28 03/13/2023 08:08 AM    ASTSGOT 21 02/07/2023 05:41 AM    ALTSGPT 44 (H) 03/13/2023 08:08 AM    ALTSGPT 16 02/07/2023 05:41 AM        Physical Exam:   GEN: No apparent distress  HEENT: Head normocephalic, atraumatic.  Sclera nonicteric bilaterally, no ocular discharge appreciated bilaterally.  PULMONARY: Breathing nonlabored on room air, no respiratory accessory muscle use.  Not requiring supplemental oxygen.  SKIN: No appreciable skin breakdown on exposed areas of skin.  PSYCH: Mood and affect within normal limits.  NEURO: Awake alert.  Conversational.  Logical thought content.  Does have movement in finger flexion, extension, wrist flexion and extension.  Is ambulatory with a single-point cane.    Imaging:   MRI Brain 2/1/23  Acute-subacute hemorrhage in the left thalamus and adjacent parietal corona radiata with mass effect upon the posterior third ventricle and slight midline shift measuring 4 to 5 mm. Surrounding edema noted which extends to the cerebral peduncle on the   tectum.  No abnormal vascularity or abnormal enhancement is identified in the vicinity of the hemorrhage to suggest an underlying lesion.  Nonspecific T2 hyperintensities are noted in the periventricular and deep white matter, most likely  related to chronic microvascular ischemia.    ASSESSMENT/PLAN: Anali Pan  is a 57 y.o. female with rehabilitation history significant for left basal ganglia hemorrhagic stroke secondary to uncontrolled HTN 1/31/23, here for evaluation. The following plan was discussed with the patient who is in agreement.     Visit Diagnoses     ICD-10-CM   1. Hemorrhagic stroke (HCC)  I61.9   2. Right hemiparesis (HCC)  G81.91   3. Impaired mobility and endurance  Z74.09   4. Impaired mobility and activities of daily living  Z74.09    Z78.9   5. Ambulates with cane  Z99.89        Rehab/Neuro:   Left basal ganglia hemorrhagic stroke secondary to uncontrolled HTN 1/31/23  Right hemiparesis  Right sensory impairment  -Records reviewed as aforementioned in the HPI.  -Therapy: Currently established with home health, will transition to outpatient therapy in [place]  - Occupation: Had been a   - Driving status: Currently not driving.  Counseled on safe return to driving, will need driving evaluation.  - Counseled on neuro recovery  -Off of her statin, will have lipid panel rechecked in June.    Assessment of Current Functional Status: 4/10/2023 cognitively she feels like she is doing very well and is back to her baseline.  The most troubling symptoms for her are her continued sensory impairment as well as the weakness on the right side.  The sensory impairment is more pronounced than the weakness as her strength is slowly returning.    Spasticity:   -Present, not limiting function at this time.  Especially feels it in her shoulder region as well as toes curling when she walks.  -Counseled extensively on various ways to treat spasticity.  If it starts to impair her function should consider Botox injections.  Counseled on Botox injections.    Neuropathic Pain:   Paresthesias, nonpainful  -Monitor  -Counseled on sensory recovery    Bladder:   -Continent, volitional    Neurogenic Bowel:   -Continent, volitional    Sleep:    -Sleeping well      Follow up: 3 months general reevaluation    Total time spent was 42 minutes.  Included in this time is the time spent preparing for the visit including record review, my exam and evaluation, counseling and education regarding that which is aforementioned in the assessment and plan.  Time was spent documenting into patient's electronic health record.  Time was spent ordering the appropriate labs, tests, procedures, referrals, medications.  Including this time was also the time spent in care coordination. Included this time as the time spent obtaining history from someone other than the patient.  Some of the time included occurred outside of charting time.  Discussion involved the patient.      Please note that this dictation was created using voice recognition software. I have made every reasonable attempt to correct obvious errors but there may be errors of grammar and content that I may have overlooked prior to finalization of this note.    Dr. Larisa De La Torre DO, MS  Department of Physical Medicine & Rehabilitation  Neuro Rehabilitation Clinic  [facility]

## 2025-02-04 NOTE — ASSESSMENT & PLAN NOTE
Likely related to uncontrolled hypertension  MRI showed subacute hemorrhagic left thalamus  CT head did not show obstruction on the carotid artery  Bp goal 100-140 STRICT, continue amlodipine, clonidine patch.  Add losartan  Echo showed PFO versus ASD, discussed with neurology, not related to the stroke.  PT and OT and physiatry consult, likely transfer to rehab next week.  Okay by neuro to start DVT prophylaxis  Neurology on board, appreciate recommendations     Josiah Coy  Surgery  [address] [place] [id number]  Phone: [phone]  Fax: [phone]  Established Patient  Follow Up Time: